# Patient Record
Sex: FEMALE | Race: WHITE | NOT HISPANIC OR LATINO | Employment: OTHER | URBAN - METROPOLITAN AREA
[De-identification: names, ages, dates, MRNs, and addresses within clinical notes are randomized per-mention and may not be internally consistent; named-entity substitution may affect disease eponyms.]

---

## 2023-03-23 ENCOUNTER — NURSING HOME VISIT (OUTPATIENT)
Dept: POST ACUTE CARE | Facility: EXTERNAL LOCATION | Age: 88
End: 2023-03-23
Payer: COMMERCIAL

## 2023-03-23 DIAGNOSIS — Z86.73 HISTORY OF CVA (CEREBROVASCULAR ACCIDENT): ICD-10-CM

## 2023-03-23 DIAGNOSIS — I10 BENIGN ESSENTIAL HTN: ICD-10-CM

## 2023-03-23 DIAGNOSIS — I50.9 CHRONIC CONGESTIVE HEART FAILURE, UNSPECIFIED HEART FAILURE TYPE (MULTI): ICD-10-CM

## 2023-03-23 DIAGNOSIS — R63.5 WEIGHT GAIN: ICD-10-CM

## 2023-03-23 DIAGNOSIS — F02.80 ALZHEIMER'S DISEASE (MULTI): Primary | ICD-10-CM

## 2023-03-23 DIAGNOSIS — G30.9 SEVERE ALZHEIMER'S DEMENTIA WITH OTHER BEHAVIORAL DISTURBANCE, UNSPECIFIED TIMING OF DEMENTIA ONSET (MULTI): ICD-10-CM

## 2023-03-23 DIAGNOSIS — M62.81 GENERALIZED MUSCLE WEAKNESS: ICD-10-CM

## 2023-03-23 DIAGNOSIS — G30.9 ALZHEIMER'S DISEASE (MULTI): Primary | ICD-10-CM

## 2023-03-23 DIAGNOSIS — Z74.09 IMMOBILITY: ICD-10-CM

## 2023-03-23 DIAGNOSIS — R53.1 LEFT-SIDED WEAKNESS: ICD-10-CM

## 2023-03-23 DIAGNOSIS — F02.C18 SEVERE ALZHEIMER'S DEMENTIA WITH OTHER BEHAVIORAL DISTURBANCE, UNSPECIFIED TIMING OF DEMENTIA ONSET (MULTI): ICD-10-CM

## 2023-03-23 PROCEDURE — 99309 SBSQ NF CARE MODERATE MDM 30: CPT | Performed by: NURSE PRACTITIONER

## 2023-03-23 NOTE — LETTER
Patient: Shonda Solano  : 3/14/1934    Encounter Date: 2023    Name: Shonda Solano    : 3/14/1934    Chief Complaint:  Follow up on Alzheimer's disease; Dementia; etc...    HPI   89 year old female patient at [facility] in Long Term Care.  She resides in the [facility].     Medical history includes: Difficulty in walking; muscle weakness (generalized); Acquired hammer toes on the right & left foot; Personal history of other infectious and parasitic diseases; disorientation; Alzheimer's disease; unspecified; symbolic dysfunctions; other speech and language deficits following unspecified cerebrovascular disease; mood disorder due to known physiological condition, unspecified; Bipolar disorder; Displaced intertrochanteric fracture of right femur; need for assistance with personal care; chronic kidney disease; primary osteoarthritis; unspecified ankle and foot; polyneuropathy, unspecified; unspecified abnormalities of gait and mobility; other osteoporosis without current pathological fracture.      Chronic comorbidities as follows:     Alzheimer's disease; Dementia:   Chronic. Stable.  Calm, cooperative, pleasantly confused.   A x O x 1.   On Aricept.   Also follows up with in-house psych NP at [facility]  In [facility] memory care unit.   Patient seen today she is in her room in bed.   Mentation at baseline.  No agitation reported.           Chronic diastolic heart disease; Benign essential HTN:   Chronic. Stable.  Recent /77  Not on any medications for HTN.   No increased edema or SOB reported.   Patient sitting in wheelchair.  Calm, cooperative.   No complaints of chest pain, SOB, headaches or dizziness.      Generalized muscle weakness; Immobility; :   Chronic. Stable.   In wheelchair. Spends most of the day sitting in a wheelchair.   Able to propel her wheelchair independently.    Weight gain:  Recent weights:  3/5/23 137.8 #  23 138.2- #  23 123.6 #     History of  CVA/left sided weakness:  Patient had a stroke in .  Chronic. Stable.  On ASA and Plavix.                 Reviewed  EMR  Reviewed medical, social, surgical and family history.  Reviewed all current medications and performed medication reconciliation.  Performed prescription drug management.  Reviewed vital signs AND lab results  Reviewed [facility] Documentation  Discussed patient with nursing.       ROS: Limited ROS due to mentation; ROS negative unless noted in HPI.     Surgical History  Problems    · History of Femur fracture repair   · History of Tonsillectomy     Family History  Mother    · Family history of   Father    · Family history of      Social History   · Never a smoker  No alcohol  No illicit drugs  Lives in long term care facility            Code Status: do not resuscitate DNR-cc    Past Medical History:   Diagnosis Date   • Other malaise 2020    Physical deconditioning   • Personal history of other diseases of the digestive system     History of constipation   • Personal history of other diseases of the nervous system and sense organs 2020    History of polyneuropathy   • Personal history of urinary (tract) infections     History of urinary tract infection   • Unspecified dementia, unspecified severity, without behavioral disturbance, psychotic disturbance, mood disturbance, and anxiety (CMS/[facility]) 12/10/2022    Dementia   • Unspecified diastolic (congestive) heart failure (CMS/[facility])     Diastolic heart failure   • Unspecified fracture of right femur, initial encounter for closed fracture (CMS/[facility])     Femur fracture, right             Lab Results   Component Value Date    WBC 6.4 2022    HGB 13.5 2022    HCT 42.0 2022     2022    ALT 12 10/14/2020    AST 19 10/14/2020     2022    K 4.1 2022     2022    CREATININE 0.61 2022    BUN 22 2022    CO2 27 2022    TSH 2.92 10/14/2020  "   INR 1.0 02/20/2022    HGBA1C 5.9 10/15/2020     Outside Labs  CBC: Date: 10/12/22, WBC: 4.8, Hgb: 11.9, Hct: 35.2, PLT: 290   CBC: Date: 7/7/22, WBC: 3.9, Hgb: 12.7, Hct: 38.5, PLT: 307   BMP: Date: 10/12/22, Na: 140, K: 4.3, Cl: 107, CO2: 25, BUN: 25, Cr: 0.6, Glu: 80, Ca: 9   BMP: Date: 7/7/22, Na: 141, K: 4.6, Cl: 105, CO2: 26, BUN: 30, Cr: 0.7, Glu: 113, Ca: 9.1   Hepatic Function Tests: Date: 3/8/22, AST: 15, ALT: 11, Alk Phos: 114, T Bili: 0.4, Albumin: 3.6   Hepatic Function Tests: Date: 3/9/21, AST: 15, ALT: 14, Alk Phos: 101, T Bili: 0.6, Albumin: 3.3   Lipid panel on 10/13/20 as follows: Cholesterol 186; triglyceride 84; HDL 51; ; VLDL 17.    Vitamin D 25 hydroxy level on 10/13/20 22 L    BMP: Date: 1/10/2023 Na 142 K 4.4 Cr 0.7 BUN 27 glucose 77 Ca 9.2 GFR 79  CBC: Date: 1/10/2023 WBC 6.9 Hgb 12.8 hematocrit 38.4 platelets 398  Liver function: Date: 1/10/2023 ALT 11 AST 18 alkaline phos.  112 bilirubin 0.5 albumin 3.7 protein 6.4              /77   Pulse 78   Temp 36.6 °C (97.8 °F)   Resp 18   Ht 1.6 m (5' 3\")   Wt 62.5 kg (137 lb 12.8 oz)   SpO2 95%   BMI 24.41 kg/m²      Physical Exam  Vitals and nursing note reviewed.   Constitutional:       Appearance: Normal appearance.   HENT:      Head: Normocephalic.      Right Ear: External ear normal.      Left Ear: External ear normal.      Nose: Nose normal.      Mouth/Throat:      Mouth: Mucous membranes are moist.      Pharynx: Oropharynx is clear.   Eyes:      Extraocular Movements: Extraocular movements intact.      Conjunctiva/sclera: Conjunctivae normal.      Pupils: Pupils are equal, round, and reactive to light.   Cardiovascular:      Rate and Rhythm: Normal rate and regular rhythm.      Pulses: Normal pulses.      Heart sounds: Normal heart sounds.   Pulmonary:      Effort: Pulmonary effort is normal.      Breath sounds: Normal breath sounds.   Abdominal:      General: Bowel sounds are normal.      Palpations: Abdomen is soft. "   Musculoskeletal:         General: Normal range of motion.      Cervical back: Normal range of motion and neck supple.      Comments: Generalized muscle weakness; impaired gait and mobility   Skin:     General: Skin is warm and dry.   Neurological:      Mental Status: She is alert. Mental status is at baseline. She is disoriented.      Motor: Weakness present.      Coordination: Coordination abnormal.      Gait: Gait abnormal.   Psychiatric:         Mood and Affect: Affect is inappropriate.         Speech: Speech is tangential.         Cognition and Memory: Cognition is impaired. Memory is impaired.      Comments: Dementia   A x O x 1          Assessment/Plan     Ordered CMP, CBC with diff. For April 17, 2023    Alzheimer's disease; Dementia:      Continue Aricept.   Also follows up with in-house psych NP at [facility]  In locked memory care unit.   Monitor for progression of dementia.  Monitor for behaviors.         Chronic diastolic heart disease; Benign essential HTN:       Monitor Bps.     Generalized muscle weakness; Immobility; :    In wheelchair.   Spends most of the day sitting in a wheelchair.   Able to propel her wheelchair independently.    Weight gain:  Monitor weights.      History of CVA/left sided weakness:  Patient had a stroke in October, 2020.  On ASA and Plavix.                  Problem List Items Addressed This Visit    None  Visit Diagnoses       Alzheimer's disease (CMS/HCC)    -  Primary    Severe Alzheimer's dementia with other behavioral disturbance, unspecified timing of dementia onset (CMS/HCC)        Chronic congestive heart failure, unspecified heart failure type (CMS/HCC)        Benign essential HTN        Immobility        Generalized muscle weakness        Weight gain        History of CVA (cerebrovascular accident)        Left-sided weakness                YIMI Jordan       Electronically Signed By: YIMI Jordan   3/26/23 11:18 PM

## 2023-03-26 VITALS
RESPIRATION RATE: 18 BRPM | DIASTOLIC BLOOD PRESSURE: 77 MMHG | TEMPERATURE: 97.8 F | SYSTOLIC BLOOD PRESSURE: 125 MMHG | HEART RATE: 78 BPM | BODY MASS INDEX: 24.41 KG/M2 | WEIGHT: 137.8 LBS | HEIGHT: 63 IN | OXYGEN SATURATION: 95 %

## 2023-03-27 NOTE — PROGRESS NOTES
Name: Shonda Solano    : 3/14/1934    Chief Complaint:  Follow up on Alzheimer's disease; Dementia; etc...    HPI   89 year old female patient at [facility] in Long Term Care.  She resides in the [facility].     Medical history includes: Difficulty in walking; muscle weakness (generalized); Acquired hammer toes on the right & left foot; Personal history of other infectious and parasitic diseases; disorientation; Alzheimer's disease; unspecified; symbolic dysfunctions; other speech and language deficits following unspecified cerebrovascular disease; mood disorder due to known physiological condition, unspecified; Bipolar disorder; Displaced intertrochanteric fracture of right femur; need for assistance with personal care; chronic kidney disease; primary osteoarthritis; unspecified ankle and foot; polyneuropathy, unspecified; unspecified abnormalities of gait and mobility; other osteoporosis without current pathological fracture.      Chronic comorbidities as follows:     Alzheimer's disease; Dementia:   Chronic. Stable.  Calm, cooperative, pleasantly confused.   A x O x 1.   On Aricept.   Also follows up with in-house psych NP at   In [facility] care unit.   Patient seen today she is in her room in bed.   Mentation at baseline.  No agitation reported.           Chronic diastolic heart disease; Benign essential HTN:   Chronic. Stable.  Recent /77  Not on any medications for HTN.   No increased edema or SOB reported.   Patient sitting in wheelchair.  Calm, cooperative.   No complaints of chest pain, SOB, headaches or dizziness.      Generalized muscle weakness; Immobility; :   Chronic. Stable.   In wheelchair. Spends most of the day sitting in a wheelchair.   Able to propel her wheelchair independently.    Weight gain:  Recent weights:  3/5/23 137.8 #  23 138.2- #  23 123.6 #     History of CVA/left sided weakness:  Patient had a stroke in October,  .  Chronic. Stable.  On ASA and Plavix.                 Reviewed  EMR  Reviewed medical, social, surgical and family history.  Reviewed all current medications and performed medication reconciliation.  Performed prescription drug management.  Reviewed vital signs AND lab results  Reviewed [facility] Documentation  Discussed patient with nursing.       ROS: Limited ROS due to mentation; ROS negative unless noted in HPI.     Surgical History  Problems    · History of Femur fracture repair   · History of Tonsillectomy     Family History  Mother    · Family history of   Father    · Family history of      Social History   · Never a smoker  No alcohol  No illicit drugs  Lives in long term care facility            Code Status: do not resuscitate DNR-cc    Past Medical History:   Diagnosis Date    Other malaise 2020    Physical deconditioning    Personal history of other diseases of the digestive system     History of constipation    Personal history of other diseases of the nervous system and sense organs 2020    History of polyneuropathy    Personal history of urinary (tract) infections     History of urinary tract infection    Unspecified dementia, unspecified severity, without behavioral disturbance, psychotic disturbance, mood disturbance, and anxiety (CMS/[facility]) 12/10/2022    Dementia    Unspecified diastolic (congestive) heart failure (CMS/[facility])     Diastolic heart failure    Unspecified fracture of right femur, initial encounter for closed fracture (CMS/[facility])     Femur fracture, right             Lab Results   Component Value Date    WBC 6.4 2022    HGB 13.5 2022    HCT 42.0 2022     2022    ALT 12 10/14/2020    AST 19 10/14/2020     2022    K 4.1 2022     2022    CREATININE 0.61 2022    BUN 22 2022    CO2 27 2022    TSH 2.92 10/14/2020    INR 1.0 2022    HGBA1C 5.9 10/15/2020     Outside  "Labs  CBC: Date: 10/12/22, WBC: 4.8, Hgb: 11.9, Hct: 35.2, PLT: 290   CBC: Date: 7/7/22, WBC: 3.9, Hgb: 12.7, Hct: 38.5, PLT: 307   BMP: Date: 10/12/22, Na: 140, K: 4.3, Cl: 107, CO2: 25, BUN: 25, Cr: 0.6, Glu: 80, Ca: 9   BMP: Date: 7/7/22, Na: 141, K: 4.6, Cl: 105, CO2: 26, BUN: 30, Cr: 0.7, Glu: 113, Ca: 9.1   Hepatic Function Tests: Date: 3/8/22, AST: 15, ALT: 11, Alk Phos: 114, T Bili: 0.4, Albumin: 3.6   Hepatic Function Tests: Date: 3/9/21, AST: 15, ALT: 14, Alk Phos: 101, T Bili: 0.6, Albumin: 3.3   Lipid panel on 10/13/20 as follows: Cholesterol 186; triglyceride 84; HDL 51; ; VLDL 17.    Vitamin D 25 hydroxy level on 10/13/20 22 L    BMP: Date: 1/10/2023 Na 142 K 4.4 Cr 0.7 BUN 27 glucose 77 Ca 9.2 GFR 79  CBC: Date: 1/10/2023 WBC 6.9 Hgb 12.8 hematocrit 38.4 platelets 398  Liver function: Date: 1/10/2023 ALT 11 AST 18 alkaline phos.  112 bilirubin 0.5 albumin 3.7 protein 6.4              /77   Pulse 78   Temp 36.6 °C (97.8 °F)   Resp 18   Ht 1.6 m (5' 3\")   Wt 62.5 kg (137 lb 12.8 oz)   SpO2 95%   BMI 24.41 kg/m²      Physical Exam  Vitals and nursing note reviewed.   Constitutional:       Appearance: Normal appearance.   HENT:      Head: Normocephalic.      Right Ear: External ear normal.      Left Ear: External ear normal.      Nose: Nose normal.      Mouth/Throat:      Mouth: Mucous membranes are moist.      Pharynx: Oropharynx is clear.   Eyes:      Extraocular Movements: Extraocular movements intact.      Conjunctiva/sclera: Conjunctivae normal.      Pupils: Pupils are equal, round, and reactive to light.   Cardiovascular:      Rate and Rhythm: Normal rate and regular rhythm.      Pulses: Normal pulses.      Heart sounds: Normal heart sounds.   Pulmonary:      Effort: Pulmonary effort is normal.      Breath sounds: Normal breath sounds.   Abdominal:      General: Bowel sounds are normal.      Palpations: Abdomen is soft.   Musculoskeletal:         General: Normal range of motion. "      Cervical back: Normal range of motion and neck supple.      Comments: Generalized muscle weakness; impaired gait and mobility   Skin:     General: Skin is warm and dry.   Neurological:      Mental Status: She is alert. Mental status is at baseline. She is disoriented.      Motor: Weakness present.      Coordination: Coordination abnormal.      Gait: Gait abnormal.   Psychiatric:         Mood and Affect: Affect is inappropriate.         Speech: Speech is tangential.         Cognition and Memory: Cognition is impaired. Memory is impaired.      Comments: Dementia   A x O x 1          Assessment/Plan      Ordered CMP, CBC with diff. For April 17, 2023    Alzheimer's disease; Dementia:      Continue Aricept.   Also follows up with in-house psych NP at [facility]  In locked memory care unit.   Monitor for progression of dementia.  Monitor for behaviors.         Chronic diastolic heart disease; Benign essential HTN:       Monitor Bps.     Generalized muscle weakness; Immobility; :    In wheelchair.   Spends most of the day sitting in a wheelchair.   Able to propel her wheelchair independently.    Weight gain:  Monitor weights.      History of CVA/left sided weakness:  Patient had a stroke in October, 2020.  On ASA and Plavix.                  Problem List Items Addressed This Visit    None  Visit Diagnoses       Alzheimer's disease (CMS/HCC)    -  Primary    Severe Alzheimer's dementia with other behavioral disturbance, unspecified timing of dementia onset (CMS/HCC)        Chronic congestive heart failure, unspecified heart failure type (CMS/HCC)        Benign essential HTN        Immobility        Generalized muscle weakness        Weight gain        History of CVA (cerebrovascular accident)        Left-sided weakness                Sandi Rudolph, SUSIE-CNP

## 2023-04-19 ENCOUNTER — NURSING HOME VISIT (OUTPATIENT)
Dept: POST ACUTE CARE | Facility: EXTERNAL LOCATION | Age: 88
End: 2023-04-19
Payer: COMMERCIAL

## 2023-04-19 DIAGNOSIS — G30.9 ALZHEIMER DISEASE (MULTI): ICD-10-CM

## 2023-04-19 DIAGNOSIS — F02.80 ALZHEIMER DISEASE (MULTI): ICD-10-CM

## 2023-04-19 DIAGNOSIS — F33.9 EPISODE OF RECURRENT MAJOR DEPRESSIVE DISORDER, UNSPECIFIED DEPRESSION EPISODE SEVERITY (CMS-HCC): ICD-10-CM

## 2023-04-19 DIAGNOSIS — I63.9 CEREBROVASCULAR ACCIDENT (CVA), UNSPECIFIED MECHANISM (MULTI): Primary | ICD-10-CM

## 2023-04-19 PROCEDURE — 99308 SBSQ NF CARE LOW MDM 20: CPT | Performed by: INTERNAL MEDICINE

## 2023-04-19 NOTE — LETTER
Patient: Shonda Solano  : 3/14/1934    Encounter Date: 2023     Ms Solano is a 89-year-old woman with history of Alzheimer's dementia,CVA().   She is a long-term resident at [facility]. She is confused   No recent falls. Appears sleepy today. No concerns by staff.   ROS: no changes in weight   Chest- no pain or palpitations   Lungs- no cough or SOB   Abd- no pain, no N/V/D   Ext- no swelling   Psych- memory impaired   Vital signs: /77   Temperature 97.8   Pulse 82   Weight 139.4 pounds   Gen- NAD, pleasantly confused   Neck- supple, no jvd   Lungs: Lungs clear toauscultation   Cardio: S1-S2 normal   ABD: Soft nontender bowel sounds present   Musc/Skel: No deformities or edema   Extremities: No edema   Neuro- alert, no gross deformity   Psych: Memory impaired   Assessment and Plan:   CVA-stable   Continuewith aspirin, Plavix   continue with fall and safety precautions   Alzheimer's dementia-stable   Continue with Aricept   Depression- followed by Psych   Continue with supportive care      Electronically Signed By: Indiana Wen MD   23 11:09 AM

## 2023-04-21 NOTE — PROGRESS NOTES
Ms Solano is a 89-year-old woman with history of Alzheimer's dementia,CVA(2020).   She is a long-term resident at [facility]. She is confused   No recent falls. Appears sleepy today. No concerns by staff.   ROS: no changes in weight   Chest- no pain or palpitations   Lungs- no cough or SOB   Abd- no pain, no N/V/D   Ext- no swelling   Psych- memory impaired   Vital signs: /77   Temperature 97.8   Pulse 82   Weight 139.4 pounds   Gen- NAD, pleasantly confused   Neck- supple, no jvd   Lungs: Lungs clear toauscultation   Cardio: S1-S2 normal   ABD: Soft nontender bowel sounds present   Musc/Skel: No deformities or edema   Extremities: No edema   Neuro- alert, no gross deformity   Psych: Memory impaired   Assessment and Plan:   CVA-stable   Continuewith aspirin, Plavix   continue with fall and safety precautions   Alzheimer's dementia-stable   Continue with Aricept   Depression- followed by Psych   Continue with supportive care

## 2023-06-23 ENCOUNTER — NURSING HOME VISIT (OUTPATIENT)
Dept: POST ACUTE CARE | Facility: EXTERNAL LOCATION | Age: 88
End: 2023-06-23
Payer: COMMERCIAL

## 2023-06-23 DIAGNOSIS — F33.9 EPISODE OF RECURRENT MAJOR DEPRESSIVE DISORDER, UNSPECIFIED DEPRESSION EPISODE SEVERITY (CMS-HCC): ICD-10-CM

## 2023-06-23 DIAGNOSIS — I63.00 CEREBROVASCULAR ACCIDENT (CVA) DUE TO THROMBOSIS OF PRECEREBRAL ARTERY (MULTI): ICD-10-CM

## 2023-06-23 DIAGNOSIS — E46 MALNUTRITION, UNSPECIFIED TYPE (MULTI): Primary | ICD-10-CM

## 2023-06-23 PROCEDURE — 99308 SBSQ NF CARE LOW MDM 20: CPT | Performed by: INTERNAL MEDICINE

## 2023-06-23 NOTE — LETTER
Patient: Shonda Solano  : 3/14/1934    Encounter Date: 2023    Ms Solano is a 89-year-old woman with history of Alzheimer's dementia,CVA().  She is a long-term resident at [facility]. She is confused  No recent falls. Sitting in dining [employer]. No concerns by staff.  ROS: no changes in weight  Chest- no pain or palpitations  Lungs- no cough or SOB  Abd- no pain, no N/V/D  Ext- no swelling  Psych- memory impaired  Vital signs: /72  Temperature 97.6  Pulse 74  Weight 138.6 pounds  Gen- NAD, pleasantly confused  Neck- supple, no jvd  Lungs: Lungs clear toauscultation  Cardio: S1-S2 normal  ABD: Soft nontender bowel sounds present  Musc/Skel: No deformities or edema  Extremities: No edema  Neuro- alert, no gross deformity  Psych: Memory impaired    Assessment and Plan:  CVA-stable  Continue with aspirin, Plavix  continue with fall and safety precautions  Alzheimer's dementia-stable  Continue with Aricept  Depression- followed by Psych  Continue with supportive care      Electronically Signed By: Indiana Wen MD   23  9:36 PM

## 2023-06-25 PROBLEM — E46 MALNUTRITION, UNSPECIFIED TYPE (MULTI): Status: ACTIVE | Noted: 2023-06-25

## 2023-06-26 NOTE — PROGRESS NOTES
Ms Solano is a 89-year-old woman with history of Alzheimer's dementia,CVA(2020).  She is a long-term resident at [facility]. She is confused  No recent falls. Sitting in [place] [employer]. No concerns by staff.  ROS: no changes in weight  Chest- no pain or palpitations  Lungs- no cough or SOB  Abd- no pain, no N/V/D  Ext- no swelling  Psych- memory impaired  Vital signs: /72  Temperature 97.6  Pulse 74  Weight 138.6 pounds  Gen- NAD, pleasantly confused  Neck- supple, no jvd  Lungs: Lungs clear toauscultation  Cardio: S1-S2 normal  ABD: Soft nontender bowel sounds present  Musc/Skel: No deformities or edema  Extremities: No edema  Neuro- alert, no gross deformity  Psych: Memory impaired    Assessment and Plan:  CVA-stable  Continue with aspirin, Plavix  continue with fall and safety precautions  Alzheimer's dementia-stable  Continue with Aricept  Depression- followed by Psych  Continue with supportive care

## 2023-07-06 ENCOUNTER — NURSING HOME VISIT (OUTPATIENT)
Dept: POST ACUTE CARE | Facility: EXTERNAL LOCATION | Age: 88
End: 2023-07-06
Payer: COMMERCIAL

## 2023-07-06 DIAGNOSIS — Z74.09 IMMOBILITY: ICD-10-CM

## 2023-07-06 DIAGNOSIS — M62.81 GENERALIZED MUSCLE WEAKNESS: ICD-10-CM

## 2023-07-06 DIAGNOSIS — F02.80 ALZHEIMER DISEASE (MULTI): ICD-10-CM

## 2023-07-06 DIAGNOSIS — I50.9 CHRONIC CONGESTIVE HEART FAILURE, UNSPECIFIED HEART FAILURE TYPE (MULTI): ICD-10-CM

## 2023-07-06 DIAGNOSIS — R53.1 LEFT-SIDED WEAKNESS: ICD-10-CM

## 2023-07-06 DIAGNOSIS — I10 BENIGN ESSENTIAL HTN: ICD-10-CM

## 2023-07-06 DIAGNOSIS — Z86.73 HISTORY OF CVA (CEREBROVASCULAR ACCIDENT): ICD-10-CM

## 2023-07-06 DIAGNOSIS — H10.33 ACUTE BACTERIAL CONJUNCTIVITIS OF BOTH EYES: Primary | ICD-10-CM

## 2023-07-06 DIAGNOSIS — F02.C18 SEVERE ALZHEIMER'S DEMENTIA WITH OTHER BEHAVIORAL DISTURBANCE, UNSPECIFIED TIMING OF DEMENTIA ONSET (MULTI): ICD-10-CM

## 2023-07-06 DIAGNOSIS — G30.9 SEVERE ALZHEIMER'S DEMENTIA WITH OTHER BEHAVIORAL DISTURBANCE, UNSPECIFIED TIMING OF DEMENTIA ONSET (MULTI): ICD-10-CM

## 2023-07-06 DIAGNOSIS — R63.5 WEIGHT GAIN: ICD-10-CM

## 2023-07-06 DIAGNOSIS — E55.9 VITAMIN D DEFICIENCY: ICD-10-CM

## 2023-07-06 DIAGNOSIS — G30.9 ALZHEIMER DISEASE (MULTI): ICD-10-CM

## 2023-07-06 PROCEDURE — 99309 SBSQ NF CARE MODERATE MDM 30: CPT | Performed by: NURSE PRACTITIONER

## 2023-07-06 NOTE — LETTER
Patient: Shonda Solano  : 3/14/1934    Encounter Date: 2023    Name: Shonda Solano    : 3/14/1934    [facility]    Chief Complaint:  Bilateral eye conjunctivitis; etc...    HPI   89 year old female patient at [facility] in Long Term Care.  She resides in the [facility].     Medical history includes: Difficulty in walking; muscle weakness (generalized); Acquired hammer toes on the right & left foot; Personal history of other infectious and parasitic diseases; disorientation; Alzheimer's disease; unspecified; symbolic dysfunctions; other speech and language deficits following unspecified cerebrovascular disease; mood disorder due to known physiological condition, unspecified; Bipolar disorder; Displaced intertrochanteric fracture of right femur; need for assistance with personal care; chronic kidney disease; primary osteoarthritis; unspecified ankle and foot; polyneuropathy, unspecified; unspecified abnormalities of gait and mobility; other osteoporosis without current pathological fracture.      Diagnoses as follows:     Bilateral eye conjunctivitis:  Nursing reports that patient's eyes are irritated and red.  Patient is on the list to see in-house ophthalmologist during his next visit to the facility, in a week or so.   The eye irritation was noticed reported today.  Patient seen today, she is in bed.  No complaints of eye pain or loss of vision.   Calm, cooperative, mentation at baseline.  No complaints of runny nose, cough, chest pain, headaches.     Vitamin D deficiency:  On 23 vitamin D 25 hydroxy level 19.62 L  Was previously on ergocalciferol.   Currently not on vitamin D3.      Alzheimer's disease; Dementia:   Calm, cooperative, pleasantly confused.   A x O x 1.   On Aricept.   Also follows up with in-house psych NP at [facility]  In [facility] care unit.   Mentation at baseline.  No agitation reported.           Chronic diastolic heart disease; Benign  essential HTN:   Chronic. Stable.  Recent /74.   Not on any medications for HTN.   No increased edema or SOB reported.   No complaints of chest pain, SOB, headaches or dizziness.        Weight gain:  Recent weights:  23 128.4 #  23 138.6 #  23 136.2 #  23 139.4 #  3/5/23 137.8 #  23 138.2- #  23 123.6 #    Immobility; Generalized muscle weakness:    In wheelchair. Spends most of the day sitting in a wheelchair.   Able to propel her wheelchair independently.     History of CVA/left sided weakness:  Patient had a stroke in .  On ASA and Plavix.                 Reviewed  EMR  Reviewed medical, social, surgical and family history.  Reviewed all current medications and performed medication reconciliation.  Performed prescription drug management.  Reviewed vital signs AND lab results  Reviewed [facility] Documentation  Discussed patient with nursing.       ROS: Limited ROS due to mentation; ROS negative unless noted in HPI.     Surgical History  Problems    · History of Femur fracture repair   · History of Tonsillectomy     Family History  Mother    · Family history of   Father    · Family history of      Social History   · Never a smoker  No alcohol  No illicit drugs  Lives in long term care facility         Past Medical History:   Diagnosis Date   • Other malaise 2020    Physical deconditioning   • Personal history of other diseases of the digestive system     History of constipation   • Personal history of other diseases of the nervous system and sense organs 2020    History of polyneuropathy   • Personal history of urinary (tract) infections     History of urinary tract infection   • Unspecified dementia, unspecified severity, without behavioral disturbance, psychotic disturbance, mood disturbance, and anxiety (CMS/[facility]) 12/10/2022    Dementia   • Unspecified diastolic (congestive) heart failure (CMS/[facility])     Diastolic heart failure   •  "Unspecified fracture of right femur, initial encounter for closed fracture (CMS/HCC)     Femur fracture, right             Lab Results   Component Value Date    WBC 6.4 02/20/2022    HGB 13.5 02/20/2022    HCT 42.0 02/20/2022     02/20/2022    ALT 12 10/14/2020    AST 19 10/14/2020     02/20/2022    K 4.1 02/20/2022     02/20/2022    CREATININE 0.61 02/20/2022    BUN 22 02/20/2022    CO2 27 02/20/2022    TSH 2.92 10/14/2020    INR 1.0 02/20/2022    HGBA1C 5.9 10/15/2020     Outside Labs  CBC: Date: 10/12/22, WBC: 4.8, Hgb: 11.9, Hct: 35.2, PLT: 290   CBC: Date: 7/7/22, WBC: 3.9, Hgb: 12.7, Hct: 38.5, PLT: 307   BMP: Date: 10/12/22, Na: 140, K: 4.3, Cl: 107, CO2: 25, BUN: 25, Cr: 0.6, Glu: 80, Ca: 9   BMP: Date: 7/7/22, Na: 141, K: 4.6, Cl: 105, CO2: 26, BUN: 30, Cr: 0.7, Glu: 113, Ca: 9.1   Hepatic Function Tests: Date: 3/8/22, AST: 15, ALT: 11, Alk Phos: 114, T Bili: 0.4, Albumin: 3.6   Hepatic Function Tests: Date: 3/9/21, AST: 15, ALT: 14, Alk Phos: 101, T Bili: 0.6, Albumin: 3.3   Lipid panel on 10/13/20 as follows: Cholesterol 186; triglyceride 84; HDL 51; ; VLDL 17.    Vitamin D 25 hydroxy level on 10/13/20 22 L    BMP: Date: 1/10/2023 Na 142 K 4.4 Cr 0.7 BUN 27 glucose 77 Ca 9.2 GFR 79  CBC: Date: 1/10/2023 WBC 6.9 Hgb 12.8 hematocrit 38.4 platelets 398  Liver function: Date: 1/10/2023 ALT 11 AST 18 alkaline phos.  112 bilirubin 0.5 albumin 3.7 protein 6.4      RECENT LABS:  BMP: Date: 4/18/2023 Na 145 K 4 Cr 0.7 BUN 25 glucose 85 Ca 8.8 GFR 79  CBC: Date: 4/18/2023 WBC 5.2 Hgb 12.6 hematocrit 38.3 platelets 298  Liver function: Date: 4/18/2023 ALT 6 AST 11 alkaline phos.  94 bilirubin 0.5 albumin 3.4 protein 5.7   4/18/23 vitamin D 25 hydroxy level 19.62 L      /74   Pulse 70   Temp 36.5 °C (97.7 °F)   Resp 18   Ht 1.6 m (5' 3\")   Wt 58.2 kg (128 lb 6.4 oz)   SpO2 95%   BMI 22.75 kg/m²      Physical Exam  Vitals and nursing note reviewed.   Constitutional:       " General: She is awake.      Appearance: Normal appearance.   HENT:      Head: Normocephalic.      Right Ear: External ear normal.      Left Ear: External ear normal.      Nose: Nose normal.      Mouth/Throat:      Mouth: Mucous membranes are moist.      Pharynx: Oropharynx is clear.   Eyes:      Extraocular Movements: Extraocular movements intact.      Conjunctiva/sclera:      Right eye: Right conjunctiva is injected. No exudate.     Left eye: Left conjunctiva is injected. No exudate.     Pupils: Pupils are equal, round, and reactive to light.   Cardiovascular:      Rate and Rhythm: Normal rate and regular rhythm.      Pulses: Normal pulses.      Heart sounds: Normal heart sounds.   Pulmonary:      Effort: Pulmonary effort is normal.      Breath sounds: Normal breath sounds.   Abdominal:      General: Bowel sounds are normal.      Palpations: Abdomen is soft.   Musculoskeletal:         General: Normal range of motion.      Cervical back: Normal range of motion and neck supple.      Comments: Generalized muscle weakness; impaired gait and mobility   Skin:     General: Skin is warm and dry.   Neurological:      Mental Status: She is alert. Mental status is at baseline. She is disoriented.      Motor: Weakness present.      Coordination: Coordination abnormal.      Gait: Gait abnormal.   Psychiatric:         Mood and Affect: Affect is inappropriate.         Speech: Speech is tangential.         Behavior: Behavior is cooperative.         Cognition and Memory: Cognition is impaired. Memory is impaired.      Comments: Dementia   A x O x 1          Assessment/Plan         Ordered CMP, CBC with diff., vitamin D hydroxy level for Monday.    Bilateral eye conjunctivitis:      Start moxifloxacin ophthalmic 0.5% solution: 1 drop in both eyes TID for 7 days.   Patient is on the list to see in-house ophthalmologist during his next visit to the facility, in a week or so.   Follow up with ophthalmologist.     Vitamin D  deficiency:  Start vitamin D 3 1000 units PO every day.  Ordered vitamin D 25 hydroxy level for Monday.        Alzheimer's disease; Dementia:   Continue Aricept.   Follow up with in-house psych NP at [facility]  In locked memory care unit.   Monitor for agitation/behaviors.         Chronic diastolic heart disease; Benign essential HTN:   Monitor Bps.  Monitor weights.      Weight gain:  Monitor weights.    Immobility; Generalized muscle weakness:    Continue to use wheelchair.    Fall prevention strategies.      History of CVA/left sided weakness:  Patient had a stroke in October, 2020.  Continue ASA and Plavix.          Problem List Items Addressed This Visit    None  Visit Diagnoses       Acute bacterial conjunctivitis of both eyes    -  Primary    Vitamin D deficiency        Alzheimer disease (CMS/HCC)        Severe Alzheimer's dementia with other behavioral disturbance, unspecified timing of dementia onset (CMS/HCC)        Chronic congestive heart failure, unspecified heart failure type (CMS/HCC)        Benign essential HTN        Weight gain        Immobility        Generalized muscle weakness        History of CVA (cerebrovascular accident)        Left-sided weakness              YIMI Jordan       Electronically Signed By: YIMI Jordan   7/9/23 10:44 PM

## 2023-07-09 VITALS
HEIGHT: 63 IN | DIASTOLIC BLOOD PRESSURE: 74 MMHG | WEIGHT: 128.4 LBS | OXYGEN SATURATION: 95 % | RESPIRATION RATE: 18 BRPM | SYSTOLIC BLOOD PRESSURE: 122 MMHG | BODY MASS INDEX: 22.75 KG/M2 | HEART RATE: 70 BPM | TEMPERATURE: 97.7 F

## 2023-07-10 NOTE — PROGRESS NOTES
Name: Shonda Solano    : 3/14/1934    [facility]    Chief Complaint:  Bilateral eye conjunctivitis; etc...    HPI   89 year old female patient at [facility] in Long Term Care.  She resides in the [facility].     Medical history includes: Difficulty in walking; muscle weakness (generalized); Acquired hammer toes on the right & left foot; Personal history of other infectious and parasitic diseases; disorientation; Alzheimer's disease; unspecified; symbolic dysfunctions; other speech and language deficits following unspecified cerebrovascular disease; mood disorder due to known physiological condition, unspecified; Bipolar disorder; Displaced intertrochanteric fracture of right femur; need for assistance with personal care; chronic kidney disease; primary osteoarthritis; unspecified ankle and foot; polyneuropathy, unspecified; unspecified abnormalities of gait and mobility; other osteoporosis without current pathological fracture.      Diagnoses as follows:     Bilateral eye conjunctivitis:  Nursing reports that patient's eyes are irritated and red.  Patient is on the list to see in-house ophthalmologist during his next visit to the facility, in a week or so.   The eye irritation was noticed reported today.  Patient seen today, she is in bed.  No complaints of eye pain or loss of vision.   Calm, cooperative, mentation at baseline.  No complaints of runny nose, cough, chest pain, headaches.     Vitamin D deficiency:  On 23 vitamin D 25 hydroxy level 19.62 L  Was previously on ergocalciferol.   Currently not on vitamin D3.      Alzheimer's disease; Dementia:   Calm, cooperative, pleasantly confused.   A x O x 1.   On Aricept.   Also follows up with in-house psych NP at [facility]  In [facility] care unit.   Mentation at baseline.  No agitation reported.           Chronic diastolic heart disease; Benign essential HTN:   Chronic. Stable.  Recent /74.   Not on any  medications for HTN.   No increased edema or SOB reported.   No complaints of chest pain, SOB, headaches or dizziness.        Weight gain:  Recent weights:  23 128.4 #  23 138.6 #  23 136.2 #  23 139.4 #  3/5/23 137.8 #  23 138.2- #  23 123.6 #    Immobility; Generalized muscle weakness:    In wheelchair. Spends most of the day sitting in a wheelchair.   Able to propel her wheelchair independently.     History of CVA/left sided weakness:  Patient had a stroke in .  On ASA and Plavix.                 Reviewed  EMR  Reviewed medical, social, surgical and family history.  Reviewed all current medications and performed medication reconciliation.  Performed prescription drug management.  Reviewed vital signs AND lab results  Reviewed [facility] Documentation  Discussed patient with nursing.       ROS: Limited ROS due to mentation; ROS negative unless noted in HPI.     Surgical History  Problems    · History of Femur fracture repair   · History of Tonsillectomy     Family History  Mother    · Family history of   Father    · Family history of      Social History   · Never a smoker  No alcohol  No illicit drugs  Lives in long term care facility         Past Medical History:   Diagnosis Date    Other malaise 2020    Physical deconditioning    Personal history of other diseases of the digestive system     History of constipation    Personal history of other diseases of the nervous system and sense organs 2020    History of polyneuropathy    Personal history of urinary (tract) infections     History of urinary tract infection    Unspecified dementia, unspecified severity, without behavioral disturbance, psychotic disturbance, mood disturbance, and anxiety (CMS/[facility]) 12/10/2022    Dementia    Unspecified diastolic (congestive) heart failure (CMS/[facility])     Diastolic heart failure    Unspecified fracture of right femur, initial encounter for closed fracture  "(CMS/HCC)     Femur fracture, right             Lab Results   Component Value Date    WBC 6.4 02/20/2022    HGB 13.5 02/20/2022    HCT 42.0 02/20/2022     02/20/2022    ALT 12 10/14/2020    AST 19 10/14/2020     02/20/2022    K 4.1 02/20/2022     02/20/2022    CREATININE 0.61 02/20/2022    BUN 22 02/20/2022    CO2 27 02/20/2022    TSH 2.92 10/14/2020    INR 1.0 02/20/2022    HGBA1C 5.9 10/15/2020     Outside Labs  CBC: Date: 10/12/22, WBC: 4.8, Hgb: 11.9, Hct: 35.2, PLT: 290   CBC: Date: 7/7/22, WBC: 3.9, Hgb: 12.7, Hct: 38.5, PLT: 307   BMP: Date: 10/12/22, Na: 140, K: 4.3, Cl: 107, CO2: 25, BUN: 25, Cr: 0.6, Glu: 80, Ca: 9   BMP: Date: 7/7/22, Na: 141, K: 4.6, Cl: 105, CO2: 26, BUN: 30, Cr: 0.7, Glu: 113, Ca: 9.1   Hepatic Function Tests: Date: 3/8/22, AST: 15, ALT: 11, Alk Phos: 114, T Bili: 0.4, Albumin: 3.6   Hepatic Function Tests: Date: 3/9/21, AST: 15, ALT: 14, Alk Phos: 101, T Bili: 0.6, Albumin: 3.3   Lipid panel on 10/13/20 as follows: Cholesterol 186; triglyceride 84; HDL 51; ; VLDL 17.    Vitamin D 25 hydroxy level on 10/13/20 22 L    BMP: Date: 1/10/2023 Na 142 K 4.4 Cr 0.7 BUN 27 glucose 77 Ca 9.2 GFR 79  CBC: Date: 1/10/2023 WBC 6.9 Hgb 12.8 hematocrit 38.4 platelets 398  Liver function: Date: 1/10/2023 ALT 11 AST 18 alkaline phos.  112 bilirubin 0.5 albumin 3.7 protein 6.4      RECENT LABS:  BMP: Date: 4/18/2023 Na 145 K 4 Cr 0.7 BUN 25 glucose 85 Ca 8.8 GFR 79  CBC: Date: 4/18/2023 WBC 5.2 Hgb 12.6 hematocrit 38.3 platelets 298  Liver function: Date: 4/18/2023 ALT 6 AST 11 alkaline phos.  94 bilirubin 0.5 albumin 3.4 protein 5.7   4/18/23 vitamin D 25 hydroxy level 19.62 L      /74   Pulse 70   Temp 36.5 °C (97.7 °F)   Resp 18   Ht 1.6 m (5' 3\")   Wt 58.2 kg (128 lb 6.4 oz)   SpO2 95%   BMI 22.75 kg/m²      Physical Exam  Vitals and nursing note reviewed.   Constitutional:       General: She is awake.      Appearance: Normal appearance.   HENT:      Head: " Normocephalic.      Right Ear: External ear normal.      Left Ear: External ear normal.      Nose: Nose normal.      Mouth/Throat:      Mouth: Mucous membranes are moist.      Pharynx: Oropharynx is clear.   Eyes:      Extraocular Movements: Extraocular movements intact.      Conjunctiva/sclera:      Right eye: Right conjunctiva is injected. No exudate.     Left eye: Left conjunctiva is injected. No exudate.     Pupils: Pupils are equal, round, and reactive to light.   Cardiovascular:      Rate and Rhythm: Normal rate and regular rhythm.      Pulses: Normal pulses.      Heart sounds: Normal heart sounds.   Pulmonary:      Effort: Pulmonary effort is normal.      Breath sounds: Normal breath sounds.   Abdominal:      General: Bowel sounds are normal.      Palpations: Abdomen is soft.   Musculoskeletal:         General: Normal range of motion.      Cervical back: Normal range of motion and neck supple.      Comments: Generalized muscle weakness; impaired gait and mobility   Skin:     General: Skin is warm and dry.   Neurological:      Mental Status: She is alert. Mental status is at baseline. She is disoriented.      Motor: Weakness present.      Coordination: Coordination abnormal.      Gait: Gait abnormal.   Psychiatric:         Mood and Affect: Affect is inappropriate.         Speech: Speech is tangential.         Behavior: Behavior is cooperative.         Cognition and Memory: Cognition is impaired. Memory is impaired.      Comments: Dementia   A x O x 1          Assessment/Plan          Ordered CMP, CBC with diff., vitamin D hydroxy level for Monday.    Bilateral eye conjunctivitis:      Start moxifloxacin ophthalmic 0.5% solution: 1 drop in both eyes TID for 7 days.   Patient is on the list to see in-house ophthalmologist during his next visit to the facility, in a week or so.   Follow up with ophthalmologist.     Vitamin D deficiency:  Start vitamin D 3 1000 units PO every day.  Ordered vitamin D 25 hydroxy  level for Monday.        Alzheimer's disease; Dementia:   Continue Aricept.   Follow up with in-house psych NP at [facility]  In locked memory care unit.   Monitor for agitation/behaviors.         Chronic diastolic heart disease; Benign essential HTN:   Monitor Bps.  Monitor weights.      Weight gain:  Monitor weights.    Immobility; Generalized muscle weakness:    Continue to use wheelchair.    Fall prevention strategies.      History of CVA/left sided weakness:  Patient had a stroke in October, 2020.  Continue ASA and Plavix.          Problem List Items Addressed This Visit    None  Visit Diagnoses       Acute bacterial conjunctivitis of both eyes    -  Primary    Vitamin D deficiency        Alzheimer disease (CMS/HCC)        Severe Alzheimer's dementia with other behavioral disturbance, unspecified timing of dementia onset (CMS/HCC)        Chronic congestive heart failure, unspecified heart failure type (CMS/HCC)        Benign essential HTN        Weight gain        Immobility        Generalized muscle weakness        History of CVA (cerebrovascular accident)        Left-sided weakness              Sandi Rudolph, APRN-CNP

## 2023-08-03 ENCOUNTER — NURSING HOME VISIT (OUTPATIENT)
Dept: POST ACUTE CARE | Facility: EXTERNAL LOCATION | Age: 88
End: 2023-08-03
Payer: COMMERCIAL

## 2023-08-03 DIAGNOSIS — G30.9 ALZHEIMER DISEASE (MULTI): Primary | ICD-10-CM

## 2023-08-03 DIAGNOSIS — R63.4 WEIGHT LOSS: ICD-10-CM

## 2023-08-03 DIAGNOSIS — I69.30 SEQUELA OF CEREBROVASCULAR ACCIDENT: ICD-10-CM

## 2023-08-03 DIAGNOSIS — F02.80 ALZHEIMER DISEASE (MULTI): Primary | ICD-10-CM

## 2023-08-03 DIAGNOSIS — I10 BENIGN ESSENTIAL HTN: ICD-10-CM

## 2023-08-03 DIAGNOSIS — G30.9 SEVERE ALZHEIMER'S DEMENTIA WITH OTHER BEHAVIORAL DISTURBANCE, UNSPECIFIED TIMING OF DEMENTIA ONSET (MULTI): ICD-10-CM

## 2023-08-03 DIAGNOSIS — F02.C18 SEVERE ALZHEIMER'S DEMENTIA WITH OTHER BEHAVIORAL DISTURBANCE, UNSPECIFIED TIMING OF DEMENTIA ONSET (MULTI): ICD-10-CM

## 2023-08-03 DIAGNOSIS — I50.9 CHRONIC CONGESTIVE HEART FAILURE, UNSPECIFIED HEART FAILURE TYPE (MULTI): ICD-10-CM

## 2023-08-03 DIAGNOSIS — Z74.09 IMMOBILITY: ICD-10-CM

## 2023-08-03 DIAGNOSIS — M62.81 GENERALIZED MUSCLE WEAKNESS: ICD-10-CM

## 2023-08-03 DIAGNOSIS — E55.9 VITAMIN D DEFICIENCY: ICD-10-CM

## 2023-08-03 DIAGNOSIS — R53.1 LEFT-SIDED WEAKNESS: ICD-10-CM

## 2023-08-03 PROCEDURE — 99309 SBSQ NF CARE MODERATE MDM 30: CPT | Performed by: NURSE PRACTITIONER

## 2023-08-03 NOTE — LETTER
Patient: Shonda Solano  : 3/14/1934    Encounter Date: 2023    Name: Shonda Solano    : 3/14/1934    Code Status:  DNR-cc    Chief Complaint:  Follow up on Alzheimer's disease; Dementia; etc...    HPI   89 year old female patient at [facility] in Long Term Care.  She resides in the [facility].     Medical history includes: Difficulty in walking; muscle weakness (generalized); Acquired hammer toes on the right & left foot; Personal history of other infectious and parasitic diseases; disorientation; Alzheimer's disease; unspecified; symbolic dysfunctions; other speech and language deficits following unspecified cerebrovascular disease; mood disorder due to known physiological condition, unspecified; Bipolar disorder; Displaced intertrochanteric fracture of right femur; need for assistance with personal care; chronic kidney disease; primary osteoarthritis; unspecified ankle and foot; polyneuropathy, unspecified; unspecified abnormalities of gait and mobility; other osteoporosis without current pathological fracture.      Chronic comorbidities as follows:     Alzheimer's disease; Dementia:   On Aricept.   Also follows up with in-house psych NP at   Calm, cooperative, pleasantly confused.   A x O x 1.   Patient seen today she is sitting up in a wheelchair.   Mentation at baseline.  No agitation reported.   Follows simple commands.          Benign essential HTN; Chronic diastolic heart disease; :   Recent /74  Not on any medications for HTN.   No increased edema or SOB reported.   Patient sitting in wheelchair.  Calm, cooperative.   No complaints of chest pain, SOB, headaches or dizziness.    Vitamin D deficiency:  On vitamin D3 2000 units PO every day.  On 23 vitamin D 25 hydroxy level was 19.62 L      Immobility; Generalized muscle weakness:    In wheelchair. Spends most of the day sitting in a wheelchair.   Able to propel her wheelchair  independently.    Weight loss:  Recent weights:  7/3/23 128.4 #  23 138.6 #  23 136.2 #  23 139.4 #  3/5/23 137.8 #  23 138.2 #       Sequela of  CVA/left sided weakness:  Patient had a stroke in .  On ASA and Plavix.  Requires full supportive care.                  Reviewed  EMR  Reviewed medical, social, surgical and family history.  Reviewed all current medications and performed medication reconciliation.  Performed prescription drug management.  Reviewed vital signs AND lab results  Reviewed [facility] Care Documentation  Discussed patient with nursing.       ROS: Limited ROS due to mentation; ROS negative unless noted in HPI.     Surgical History  Problems    · History of Femur fracture repair   · History of Tonsillectomy     Family History  Mother    · Family history of   Father    · Family history of      Social History   · Never a smoker  No alcohol  No illicit drugs  Lives in long term care facility              Past Medical History:   Diagnosis Date   • Other malaise 2020    Physical deconditioning   • Personal history of other diseases of the digestive system     History of constipation   • Personal history of other diseases of the nervous system and sense organs 2020    History of polyneuropathy   • Personal history of urinary (tract) infections     History of urinary tract infection   • Unspecified dementia, unspecified severity, without behavioral disturbance, psychotic disturbance, mood disturbance, and anxiety (CMS/[facility]) 12/10/2022    Dementia   • Unspecified diastolic (congestive) heart failure (CMS/[facility])     Diastolic heart failure   • Unspecified fracture of right femur, initial encounter for closed fracture (CMS/[facility])     Femur fracture, right             Lab Results   Component Value Date    WBC 6.4 2022    HGB 13.5 2022    HCT 42.0 2022     2022    ALT 12 10/14/2020    AST 19 10/14/2020     2022  "   K 4.1 02/20/2022     02/20/2022    CREATININE 0.61 02/20/2022    BUN 22 02/20/2022    CO2 27 02/20/2022    TSH 2.92 10/14/2020    INR 1.0 02/20/2022    HGBA1C 5.9 10/15/2020     Outside Labs  CBC: Date: 10/12/22, WBC: 4.8, Hgb: 11.9, Hct: 35.2, PLT: 290   CBC: Date: 7/7/22, WBC: 3.9, Hgb: 12.7, Hct: 38.5, PLT: 307   BMP: Date: 10/12/22, Na: 140, K: 4.3, Cl: 107, CO2: 25, BUN: 25, Cr: 0.6, Glu: 80, Ca: 9   BMP: Date: 7/7/22, Na: 141, K: 4.6, Cl: 105, CO2: 26, BUN: 30, Cr: 0.7, Glu: 113, Ca: 9.1   Hepatic Function Tests: Date: 3/8/22, AST: 15, ALT: 11, Alk Phos: 114, T Bili: 0.4, Albumin: 3.6   Hepatic Function Tests: Date: 3/9/21, AST: 15, ALT: 14, Alk Phos: 101, T Bili: 0.6, Albumin: 3.3   Lipid panel on 10/13/20 as follows: Cholesterol 186; triglyceride 84; HDL 51; ; VLDL 17.    Vitamin D 25 hydroxy level on 10/13/20 22 L    BMP: Date: 1/10/2023 Na 142 K 4.4 Cr 0.7 BUN 27 glucose 77 Ca 9.2 GFR 79  CBC: Date: 1/10/2023 WBC 6.9 Hgb 12.8 hematocrit 38.4 platelets 398  Liver function: Date: 1/10/2023 ALT 11 AST 18 alkaline phos.  112 bilirubin 0.5 albumin 3.7 protein 6.4    BMP: Date: 4/18/2023 Na 145 K 4 Cr 0.7 BUN 25 glucose 85 Ca 8.8 GFR 79  CBC: Date: 4/18/2023 WBC 5.2 Hgb 12.6 hematocrit 38.3 platelets 298  Liver function: Date: 4/18/2023 ALT 6 AST 11 alkaline phos.  94 bilirubin 0.5 albumin 3.4 protein 5.7   4/18/23 vitamin D 25 hydroxy level 19.62 L        /74   Pulse 72   Temp 36.2 °C (97.1 °F)   Resp 18   Ht 1.6 m (5' 3\")   Wt 58.2 kg (128 lb 6.4 oz)   SpO2 98%   BMI 22.75 kg/m²      Physical Exam  Vitals and nursing note reviewed.   Constitutional:       Appearance: Normal appearance.   HENT:      Head: Normocephalic.      Right Ear: External ear normal.      Left Ear: External ear normal.      Nose: Nose normal.      Mouth/Throat:      Mouth: Mucous membranes are moist.      Pharynx: Oropharynx is clear.   Eyes:      Extraocular Movements: Extraocular movements intact.      " Conjunctiva/sclera: Conjunctivae normal.      Pupils: Pupils are equal, round, and reactive to light.   Cardiovascular:      Rate and Rhythm: Normal rate and regular rhythm.      Pulses: Normal pulses.      Heart sounds: Normal heart sounds.   Pulmonary:      Effort: Pulmonary effort is normal.      Breath sounds: Normal breath sounds.   Abdominal:      General: Bowel sounds are normal.      Palpations: Abdomen is soft.   Musculoskeletal:         General: Normal range of motion.      Cervical back: Normal range of motion and neck supple.      Comments: Generalized muscle weakness; impaired gait and mobility   Skin:     General: Skin is warm and dry.   Neurological:      Mental Status: She is alert. Mental status is at baseline. She is disoriented.      Motor: Weakness present.      Coordination: Coordination abnormal.      Gait: Gait abnormal.   Psychiatric:         Mood and Affect: Affect is inappropriate.         Speech: Speech is tangential.         Cognition and Memory: Cognition is impaired. Memory is impaired.      Comments: Dementia   A x O x 1          Assessment/Plan     Ordered CMP, CBC with diff. , vitamin D 25 hydroxy level for 8/7/23.        Alzheimer's disease; Dementia:      Continue Aricept.   Follow up with in-house psych NP at [facility]  Monitor for progression of dementia.  Monitor for behaviors.         Benign essential HTN; Chronic diastolic heart disease; :       Monitor Bps.    Vitamin D deficiency:  Continue vitamin D3 2000 units PO every day.  Order/ monitor vitamin D 25 hydroxy level      Immobility; Generalized muscle weakness:    In wheelchair.   Spends most of the day sitting in a wheelchair.   Able to propel her wheelchair independently.  Fall prevention strategies.     Weight loss:  Monitor weights.   Dietician to follow patient in-house.      Sequela of  CVA/left sided weakness:  Patient had a stroke in October, 2020.  Continue ASA and Plavix.  Requires full supportive care.              Problem List Items Addressed This Visit    None  Visit Diagnoses       Alzheimer disease (CMS/[facility])    -  Primary    Severe Alzheimer's dementia with other behavioral disturbance, unspecified timing of dementia onset (CMS/[facility])        Benign essential HTN        Chronic congestive heart failure, unspecified heart failure type (CMS/[facility])        Vitamin D deficiency        Immobility        Generalized muscle weakness        Weight loss        Sequela of cerebrovascular accident        Left-sided weakness              YIMI Jrodan       Electronically Signed By: YIMI Jordan   8/6/23  7:09 PM

## 2023-08-06 VITALS
TEMPERATURE: 97.1 F | SYSTOLIC BLOOD PRESSURE: 118 MMHG | RESPIRATION RATE: 18 BRPM | HEART RATE: 72 BPM | BODY MASS INDEX: 22.75 KG/M2 | OXYGEN SATURATION: 98 % | DIASTOLIC BLOOD PRESSURE: 74 MMHG | HEIGHT: 63 IN | WEIGHT: 128.4 LBS

## 2023-08-06 NOTE — PROGRESS NOTES
Name: Shonda Solano    : 3/14/1934    Code Status:  DNR-cc    Chief Complaint:  Follow up on Alzheimer's disease; Dementia; etc...    HPI   89 year old female patient at [facility] in Long Term Care.  She resides in the [facility].     Medical history includes: Difficulty in walking; muscle weakness (generalized); Acquired hammer toes on the right & left foot; Personal history of other infectious and parasitic diseases; disorientation; Alzheimer's disease; unspecified; symbolic dysfunctions; other speech and language deficits following unspecified cerebrovascular disease; mood disorder due to known physiological condition, unspecified; Bipolar disorder; Displaced intertrochanteric fracture of right femur; need for assistance with personal care; chronic kidney disease; primary osteoarthritis; unspecified ankle and foot; polyneuropathy, unspecified; unspecified abnormalities of gait and mobility; other osteoporosis without current pathological fracture.      Chronic comorbidities as follows:     Alzheimer's disease; Dementia:   On Aricept.   Also follows up with in-house psych NP at   Calm, cooperative, pleasantly confused.   A x O x 1.   Patient seen today she is sitting up in a wheelchair.   Mentation at baseline.  No agitation reported.   Follows simple commands.          Benign essential HTN; Chronic diastolic heart disease; :   Recent /74  Not on any medications for HTN.   No increased edema or SOB reported.   Patient sitting in wheelchair.  Calm, cooperative.   No complaints of chest pain, SOB, headaches or dizziness.    Vitamin D deficiency:  On vitamin D3 2000 units PO every day.  On 23 vitamin D 25 hydroxy level was 19.62 L      Immobility; Generalized muscle weakness:    In wheelchair. Spends most of the day sitting in a wheelchair.   Able to propel her wheelchair independently.    Weight loss:  Recent weights:  7/3/23 128.4 #  23 138.6 #  23 136.2  #  23 139.4 #  3/5/23 137.8 #  23 138.2 #       Sequela of  CVA/left sided weakness:  Patient had a stroke in .  On ASA and Plavix.  Requires full supportive care.                  Reviewed  EMR  Reviewed medical, social, surgical and family history.  Reviewed all current medications and performed medication reconciliation.  Performed prescription drug management.  Reviewed vital signs AND lab results  Reviewed [facility] Documentation  Discussed patient with nursing.       ROS: Limited ROS due to mentation; ROS negative unless noted in HPI.     Surgical History  Problems    · History of Femur fracture repair   · History of Tonsillectomy     Family History  Mother    · Family history of   Father    · Family history of      Social History   · Never a smoker  No alcohol  No illicit drugs  Lives in long term care facility              Past Medical History:   Diagnosis Date    Other malaise 2020    Physical deconditioning    Personal history of other diseases of the digestive system     History of constipation    Personal history of other diseases of the nervous system and sense organs 2020    History of polyneuropathy    Personal history of urinary (tract) infections     History of urinary tract infection    Unspecified dementia, unspecified severity, without behavioral disturbance, psychotic disturbance, mood disturbance, and anxiety (CMS/[facility]) 12/10/2022    Dementia    Unspecified diastolic (congestive) heart failure (CMS/[facility])     Diastolic heart failure    Unspecified fracture of right femur, initial encounter for closed fracture (CMS/[facility])     Femur fracture, right             Lab Results   Component Value Date    WBC 6.4 2022    HGB 13.5 2022    HCT 42.0 2022     2022    ALT 12 10/14/2020    AST 19 10/14/2020     2022    K 4.1 2022     2022    CREATININE 0.61 2022    BUN 22 2022    CO2  "27 02/20/2022    TSH 2.92 10/14/2020    INR 1.0 02/20/2022    HGBA1C 5.9 10/15/2020     Outside Labs  CBC: Date: 10/12/22, WBC: 4.8, Hgb: 11.9, Hct: 35.2, PLT: 290   CBC: Date: 7/7/22, WBC: 3.9, Hgb: 12.7, Hct: 38.5, PLT: 307   BMP: Date: 10/12/22, Na: 140, K: 4.3, Cl: 107, CO2: 25, BUN: 25, Cr: 0.6, Glu: 80, Ca: 9   BMP: Date: 7/7/22, Na: 141, K: 4.6, Cl: 105, CO2: 26, BUN: 30, Cr: 0.7, Glu: 113, Ca: 9.1   Hepatic Function Tests: Date: 3/8/22, AST: 15, ALT: 11, Alk Phos: 114, T Bili: 0.4, Albumin: 3.6   Hepatic Function Tests: Date: 3/9/21, AST: 15, ALT: 14, Alk Phos: 101, T Bili: 0.6, Albumin: 3.3   Lipid panel on 10/13/20 as follows: Cholesterol 186; triglyceride 84; HDL 51; ; VLDL 17.    Vitamin D 25 hydroxy level on 10/13/20 22 L    BMP: Date: 1/10/2023 Na 142 K 4.4 Cr 0.7 BUN 27 glucose 77 Ca 9.2 GFR 79  CBC: Date: 1/10/2023 WBC 6.9 Hgb 12.8 hematocrit 38.4 platelets 398  Liver function: Date: 1/10/2023 ALT 11 AST 18 alkaline phos.  112 bilirubin 0.5 albumin 3.7 protein 6.4    BMP: Date: 4/18/2023 Na 145 K 4 Cr 0.7 BUN 25 glucose 85 Ca 8.8 GFR 79  CBC: Date: 4/18/2023 WBC 5.2 Hgb 12.6 hematocrit 38.3 platelets 298  Liver function: Date: 4/18/2023 ALT 6 AST 11 alkaline phos.  94 bilirubin 0.5 albumin 3.4 protein 5.7   4/18/23 vitamin D 25 hydroxy level 19.62 L        /74   Pulse 72   Temp 36.2 °C (97.1 °F)   Resp 18   Ht 1.6 m (5' 3\")   Wt 58.2 kg (128 lb 6.4 oz)   SpO2 98%   BMI 22.75 kg/m²      Physical Exam  Vitals and nursing note reviewed.   Constitutional:       Appearance: Normal appearance.   HENT:      Head: Normocephalic.      Right Ear: External ear normal.      Left Ear: External ear normal.      Nose: Nose normal.      Mouth/Throat:      Mouth: Mucous membranes are moist.      Pharynx: Oropharynx is clear.   Eyes:      Extraocular Movements: Extraocular movements intact.      Conjunctiva/sclera: Conjunctivae normal.      Pupils: Pupils are equal, round, and reactive to light. "   Cardiovascular:      Rate and Rhythm: Normal rate and regular rhythm.      Pulses: Normal pulses.      Heart sounds: Normal heart sounds.   Pulmonary:      Effort: Pulmonary effort is normal.      Breath sounds: Normal breath sounds.   Abdominal:      General: Bowel sounds are normal.      Palpations: Abdomen is soft.   Musculoskeletal:         General: Normal range of motion.      Cervical back: Normal range of motion and neck supple.      Comments: Generalized muscle weakness; impaired gait and mobility   Skin:     General: Skin is warm and dry.   Neurological:      Mental Status: She is alert. Mental status is at baseline. She is disoriented.      Motor: Weakness present.      Coordination: Coordination abnormal.      Gait: Gait abnormal.   Psychiatric:         Mood and Affect: Affect is inappropriate.         Speech: Speech is tangential.         Cognition and Memory: Cognition is impaired. Memory is impaired.      Comments: Dementia   A x O x 1          Assessment/Plan      Ordered CMP, CBC with diff. , vitamin D 25 hydroxy level for 8/7/23.        Alzheimer's disease; Dementia:      Continue Aricept.   Follow up with in-house psych NP at [facility]  Monitor for progression of dementia.  Monitor for behaviors.         Benign essential HTN; Chronic diastolic heart disease; :       Monitor Bps.    Vitamin D deficiency:  Continue vitamin D3 2000 units PO every day.  Order/ monitor vitamin D 25 hydroxy level      Immobility; Generalized muscle weakness:    In wheelchair.   Spends most of the day sitting in a wheelchair.   Able to propel her wheelchair independently.  Fall prevention strategies.     Weight loss:  Monitor weights.   Dietician to follow patient in-house.      Sequela of  CVA/left sided weakness:  Patient had a stroke in October, 2020.  Continue ASA and Plavix.  Requires full supportive care.             Problem List Items Addressed This Visit    None  Visit Diagnoses       Alzheimer disease (CMS/[facility])    -   Primary    Severe Alzheimer's dementia with other behavioral disturbance, unspecified timing of dementia onset (CMS/[facility])        Benign essential HTN        Chronic congestive heart failure, unspecified heart failure type (CMS/[facility])        Vitamin D deficiency        Immobility        Generalized muscle weakness        Weight loss        Sequela of cerebrovascular accident        Left-sided weakness              Sandi Rudolph, APRN-CNP

## 2023-08-12 LAB
ALANINE AMINOTRANSFERASE (SGPT) (U/L) IN SER/PLAS: 8 U/L (ref 7–45)
ALBUMIN (G/DL) IN SER/PLAS: 3.6 G/DL (ref 3.4–5)
ALKALINE PHOSPHATASE (U/L) IN SER/PLAS: 108 U/L (ref 33–136)
ANION GAP IN SER/PLAS: 16 MMOL/L (ref 10–20)
ASPARTATE AMINOTRANSFERASE (SGOT) (U/L) IN SER/PLAS: 14 U/L (ref 9–39)
BASOPHILS (10*3/UL) IN BLOOD BY AUTOMATED COUNT: 0.02 X10E9/L (ref 0–0.1)
BASOPHILS/100 LEUKOCYTES IN BLOOD BY AUTOMATED COUNT: 0.2 % (ref 0–2)
BILIRUBIN TOTAL (MG/DL) IN SER/PLAS: 0.4 MG/DL (ref 0–1.2)
CALCIUM (MG/DL) IN SER/PLAS: 9 MG/DL (ref 8.6–10.3)
CARBON DIOXIDE, TOTAL (MMOL/L) IN SER/PLAS: 21 MMOL/L (ref 21–32)
CHLORIDE (MMOL/L) IN SER/PLAS: 106 MMOL/L (ref 98–107)
CREATININE (MG/DL) IN SER/PLAS: 0.81 MG/DL (ref 0.5–1.05)
EOSINOPHILS (10*3/UL) IN BLOOD BY AUTOMATED COUNT: 0.09 X10E9/L (ref 0–0.4)
EOSINOPHILS/100 LEUKOCYTES IN BLOOD BY AUTOMATED COUNT: 1 % (ref 0–6)
ERYTHROCYTE DISTRIBUTION WIDTH (RATIO) BY AUTOMATED COUNT: 14.6 % (ref 11.5–14.5)
ERYTHROCYTE MEAN CORPUSCULAR HEMOGLOBIN CONCENTRATION (G/DL) BY AUTOMATED: 30.5 G/DL (ref 32–36)
ERYTHROCYTE MEAN CORPUSCULAR VOLUME (FL) BY AUTOMATED COUNT: 97 FL (ref 80–100)
ERYTHROCYTES (10*6/UL) IN BLOOD BY AUTOMATED COUNT: 4.28 X10E12/L (ref 4–5.2)
GFR FEMALE: 69 ML/MIN/1.73M2
GLUCOSE (MG/DL) IN SER/PLAS: 88 MG/DL (ref 74–99)
HEMATOCRIT (%) IN BLOOD BY AUTOMATED COUNT: 41.6 % (ref 36–46)
HEMOGLOBIN (G/DL) IN BLOOD: 12.7 G/DL (ref 12–16)
IMMATURE GRANULOCYTES/100 LEUKOCYTES IN BLOOD BY AUTOMATED COUNT: 0.3 % (ref 0–0.9)
LEUKOCYTES (10*3/UL) IN BLOOD BY AUTOMATED COUNT: 9.1 X10E9/L (ref 4.4–11.3)
LYMPHOCYTES (10*3/UL) IN BLOOD BY AUTOMATED COUNT: 2.18 X10E9/L (ref 0.8–3)
LYMPHOCYTES/100 LEUKOCYTES IN BLOOD BY AUTOMATED COUNT: 23.9 % (ref 13–44)
MONOCYTES (10*3/UL) IN BLOOD BY AUTOMATED COUNT: 0.55 X10E9/L (ref 0.05–0.8)
MONOCYTES/100 LEUKOCYTES IN BLOOD BY AUTOMATED COUNT: 6 % (ref 2–10)
NEUTROPHILS (10*3/UL) IN BLOOD BY AUTOMATED COUNT: 6.25 X10E9/L (ref 1.6–5.5)
NEUTROPHILS/100 LEUKOCYTES IN BLOOD BY AUTOMATED COUNT: 68.6 % (ref 40–80)
PLATELETS (10*3/UL) IN BLOOD AUTOMATED COUNT: 375 X10E9/L (ref 150–450)
POTASSIUM (MMOL/L) IN SER/PLAS: 4.1 MMOL/L (ref 3.5–5.3)
PROTEIN TOTAL: 6.1 G/DL (ref 6.4–8.2)
SODIUM (MMOL/L) IN SER/PLAS: 139 MMOL/L (ref 136–145)
UREA NITROGEN (MG/DL) IN SER/PLAS: 30 MG/DL (ref 6–23)

## 2023-08-18 ENCOUNTER — NURSING HOME VISIT (OUTPATIENT)
Dept: POST ACUTE CARE | Facility: EXTERNAL LOCATION | Age: 88
End: 2023-08-18
Payer: COMMERCIAL

## 2023-08-18 DIAGNOSIS — F02.80 ALZHEIMERS DISEASE (MULTI): Primary | ICD-10-CM

## 2023-08-18 DIAGNOSIS — F31.9 BIPOLAR DEPRESSION (MULTI): ICD-10-CM

## 2023-08-18 DIAGNOSIS — I10 BENIGN ESSENTIAL HYPERTENSION: ICD-10-CM

## 2023-08-18 DIAGNOSIS — G30.9 ALZHEIMERS DISEASE (MULTI): Primary | ICD-10-CM

## 2023-08-18 PROCEDURE — 99308 SBSQ NF CARE LOW MDM 20: CPT | Performed by: INTERNAL MEDICINE

## 2023-08-18 NOTE — LETTER
Patient: Shonda Solano  : 3/14/1934    Encounter Date: 2023    Subjective: Ms Solano is a 89-year-old woman with history of Alzheimer's dementia,CVA().   She is a long-term resident at [facility]. She is confused   No recent falls. Sitting in dining [place]. No concerns by staff.   ROS: no changes in weight   Chest- no pain orpalpitations   Lungs- no cough or SOB   Abd- no pain, no N/V/D   Ext- no swelling   Psych- memory impaired   Vital signs: /74   Temperature 97.1   Pulse 72   Weight 109.2 pounds   Gen- NAD, pleasantly confused   Neck- supple, no jvd   Lungs: Lungs clear toauscultation   Cardio: S1-S2 normal   ABD: Soft nontender bowel sounds present   Musc/Skel: No deformities or edema   Extremities: No edema   Neuro- alert, no gross deformity   Psych: Memory impaired   Assessment and Plan:   CVA-stable   Continuewith aspirin, Plavix   continue with fall and safety precautions   Alzheimer's dementia-stable   Continue with Aricept   Depression- followed by Psych   Continue with supportive care      Electronically Signed By: Indiana Wen MD   23 12:41 PM

## 2023-08-25 PROBLEM — F31.9 BIPOLAR DEPRESSION (MULTI): Status: ACTIVE | Noted: 2023-08-25

## 2023-08-25 PROBLEM — I50.32 CHRONIC DIASTOLIC HEART FAILURE (MULTI): Status: ACTIVE | Noted: 2023-08-25

## 2023-08-25 PROBLEM — F02.80 ALZHEIMERS DISEASE (MULTI): Status: ACTIVE | Noted: 2023-08-25

## 2023-08-25 PROBLEM — F03.90 DEMENTIA (MULTI): Status: ACTIVE | Noted: 2023-08-25

## 2023-08-25 PROBLEM — I10 BENIGN ESSENTIAL HYPERTENSION: Status: ACTIVE | Noted: 2023-08-25

## 2023-08-25 PROBLEM — N18.9 CKD (CHRONIC KIDNEY DISEASE): Status: ACTIVE | Noted: 2023-08-25

## 2023-08-25 PROBLEM — G30.9 ALZHEIMERS DISEASE (MULTI): Status: ACTIVE | Noted: 2023-08-25

## 2023-08-25 NOTE — PROGRESS NOTES
Subjective: Ms Solano is a 89-year-old woman with history of Alzheimer's dementia,CVA(2020).   She is a long-term resident at [facility]. She is confused   No recent falls. Sitting in [place]. No concerns by staff.   ROS: no changes in weight   Chest- no pain orpalpitations   Lungs- no cough or SOB   Abd- no pain, no N/V/D   Ext- no swelling   Psych- memory impaired   Vital signs: /74   Temperature 97.1   Pulse 72   Weight 109.2 pounds   Gen- NAD, pleasantly confused   Neck- supple, no jvd   Lungs: Lungs clear toauscultation   Cardio: S1-S2 normal   ABD: Soft nontender bowel sounds present   Musc/Skel: No deformities or edema   Extremities: No edema   Neuro- alert, no gross deformity   Psych: Memory impaired   Assessment and Plan:   CVA-stable   Continuewith aspirin, Plavix   continue with fall and safety precautions   Alzheimer's dementia-stable   Continue with Aricept   Depression- followed by Psych   Continue with supportive care

## 2023-09-07 ENCOUNTER — NURSING HOME VISIT (OUTPATIENT)
Dept: POST ACUTE CARE | Facility: EXTERNAL LOCATION | Age: 88
End: 2023-09-07
Payer: COMMERCIAL

## 2023-09-07 DIAGNOSIS — I10 BENIGN ESSENTIAL HTN: ICD-10-CM

## 2023-09-07 DIAGNOSIS — M62.81 GENERALIZED MUSCLE WEAKNESS: ICD-10-CM

## 2023-09-07 DIAGNOSIS — Z74.09 IMMOBILITY: ICD-10-CM

## 2023-09-07 DIAGNOSIS — G30.9 ALZHEIMER DISEASE (MULTI): ICD-10-CM

## 2023-09-07 DIAGNOSIS — G30.9 SEVERE ALZHEIMER'S DEMENTIA WITH OTHER BEHAVIORAL DISTURBANCE, UNSPECIFIED TIMING OF DEMENTIA ONSET (MULTI): ICD-10-CM
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
## 2023-09-07 DIAGNOSIS — F02.C18 SEVERE ALZHEIMER'S DEMENTIA WITH OTHER BEHAVIORAL DISTURBANCE, UNSPECIFIED TIMING OF DEMENTIA ONSET (MULTI): ICD-10-CM

## 2023-09-07 DIAGNOSIS — E55.9 VITAMIN D DEFICIENCY: Primary | ICD-10-CM

## 2023-09-07 DIAGNOSIS — R63.4 WEIGHT LOSS: ICD-10-CM

## 2023-09-07 DIAGNOSIS — I50.9 CHRONIC CONGESTIVE HEART FAILURE, UNSPECIFIED HEART FAILURE TYPE (MULTI): ICD-10-CM

## 2023-09-07 DIAGNOSIS — F02.80 ALZHEIMER DISEASE (MULTI): ICD-10-CM
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
## 2023-09-07 DIAGNOSIS — I69.30 SEQUELA OF CEREBROVASCULAR ACCIDENT: ICD-10-CM

## 2023-09-07 PROCEDURE — 99309 SBSQ NF CARE MODERATE MDM 30: CPT | Performed by: NURSE PRACTITIONER

## 2023-09-07 NOTE — LETTER
Patient: Shonda Solano  : 3/14/1934    Encounter Date: 2023    Name: Shonda Solano    : 3/14/1934    Code Status:  DNR-cc    Chief Complaint:  Vitamin D deficiency; recent fall; etc...    HPI   89 year old female patient at [facility] in Long Term Care.  She resides in the [facility].     Medical history includes: Difficulty in walking; muscle weakness (generalized); Acquired hammer toes on the right & left foot; Personal history of other infectious and parasitic diseases; disorientation; Alzheimer's disease; unspecified; symbolic dysfunctions; other speech and language deficits following unspecified cerebrovascular disease; mood disorder due to known physiological condition, unspecified; Bipolar disorder; Displaced intertrochanteric fracture of right femur; need for assistance with personal care; chronic kidney disease; primary osteoarthritis; unspecified ankle and foot; polyneuropathy, unspecified; unspecified abnormalities of gait and mobility; other osteoporosis without current pathological fracture.      Chronic comorbidities as follows:     Vitamin D deficiency:  Patient is currently on Vitamin D 1000 units PO every day.   Recent vitamin D hydroxy level on 23 29.92 L  On 23 vitamin D 25 hydroxy level was 19.62 L  Patient seen today she is sitting up in her wheelchair.  Mentation at baseline.  Calm, cooperative, pleasant.   No complaints of chest pain.  No headaches no dizziness.     Recent fall:  Staff reports patient fell yesterday.  No complaints of any related pain or injuries reported.  No complaints from patient today.         Dementia; Alzheimer's disease :   On Aricept.   Also follows up with in-house psych NP at [facility]  Calm, cooperative, pleasantly confused.   A x O x 1.   No agitation reported.   Follows simple commands.        Benign essential HTN; Chronic diastolic heart disease; :   Recent /69  Not on any medications for HTN.   No  increased edema or SOB reported.   No complaints of chest pain, SOB, headaches or dizziness.    Generalized muscle weakness;  Immobility; :    In wheelchair. Spends most of the day sitting in a wheelchair.   Able to propel her wheelchair independently.    Weight loss:  Recent weights:  23 106.4 #  7/3/23 128.4 #  23 138.6 #  23 136.2 #  23 139.4 #    Sequela of  CVA/left sided weakness:  Patient had a stroke in .  On ASA and Plavix.  Requires full supportive care.                  Reviewed  EMR  Reviewed medical, social, surgical and family history.  Reviewed all current medications and performed medication reconciliation.  Performed prescription drug management.  Reviewed vital signs AND lab results  Reviewed [facility] Care Documentation  Discussed patient with nursing.       ROS: Limited ROS due to mentation; ROS negative unless noted in HPI.     Surgical History  Problems    · History of Femur fracture repair   · History of Tonsillectomy     Family History  Mother    · Family history of   Father    · Family history of      Social History   · Never a smoker  No alcohol  No illicit drugs  Lives in long term care facility              Past Medical History:   Diagnosis Date   • Other malaise 2020    Physical deconditioning   • Personal history of other diseases of the digestive system     History of constipation   • Personal history of other diseases of the nervous system and sense organs 2020    History of polyneuropathy   • Personal history of urinary (tract) infections     History of urinary tract infection   • Unspecified dementia, unspecified severity, without behavioral disturbance, psychotic disturbance, mood disturbance, and anxiety (CMS/[facility]) 12/10/2022    Dementia   • Unspecified diastolic (congestive) heart failure (CMS/[facility])     Diastolic heart failure   • Unspecified fracture of right femur, initial encounter for closed fracture (CMS/[facility])      Femur fracture, right             Lab Results   Component Value Date    WBC 9.1 08/12/2023    HGB 12.7 08/12/2023    HCT 41.6 08/12/2023     08/12/2023    ALT 8 08/12/2023    AST 14 08/12/2023     08/12/2023    K 4.1 08/12/2023     08/12/2023    CREATININE 0.81 08/12/2023    BUN 30 (H) 08/12/2023    CO2 21 08/12/2023    TSH 2.92 10/14/2020    INR 1.0 02/20/2022    HGBA1C 5.9 10/15/2020     Outside Labs  CBC: Date: 10/12/22, WBC: 4.8, Hgb: 11.9, Hct: 35.2, PLT: 290   CBC: Date: 7/7/22, WBC: 3.9, Hgb: 12.7, Hct: 38.5, PLT: 307   BMP: Date: 10/12/22, Na: 140, K: 4.3, Cl: 107, CO2: 25, BUN: 25, Cr: 0.6, Glu: 80, Ca: 9   BMP: Date: 7/7/22, Na: 141, K: 4.6, Cl: 105, CO2: 26, BUN: 30, Cr: 0.7, Glu: 113, Ca: 9.1   Hepatic Function Tests: Date: 3/8/22, AST: 15, ALT: 11, Alk Phos: 114, T Bili: 0.4, Albumin: 3.6   Hepatic Function Tests: Date: 3/9/21, AST: 15, ALT: 14, Alk Phos: 101, T Bili: 0.6, Albumin: 3.3   Lipid panel on 10/13/20 as follows: Cholesterol 186; triglyceride 84; HDL 51; ; VLDL 17.    Vitamin D 25 hydroxy level on 10/13/20 22 L    BMP: Date: 1/10/2023 Na 142 K 4.4 Cr 0.7 BUN 27 glucose 77 Ca 9.2 GFR 79  CBC: Date: 1/10/2023 WBC 6.9 Hgb 12.8 hematocrit 38.4 platelets 398  Liver function: Date: 1/10/2023 ALT 11 AST 18 alkaline phos.  112 bilirubin 0.5 albumin 3.7 protein 6.4    BMP: Date: 4/18/2023 Na 145 K 4 Cr 0.7 BUN 25 glucose 85 Ca 8.8 GFR 79  CBC: Date: 4/18/2023 WBC 5.2 Hgb 12.6 hematocrit 38.3 platelets 298  Liver function: Date: 4/18/2023 ALT 6 AST 11 alkaline phos.  94 bilirubin 0.5 albumin 3.4 protein 5.7   4/18/23 vitamin D 25 hydroxy level 19.62 L    BMP: Date: 8/8/2023 Na 142 K 4.9 Cr 0.6 BUN 24 glucose 65 Ca 9.2 GFR 94  CBC: Date: 8/8/2023 WBC 5.4 Hgb 12.8 hematocrit 40.4 platelets 398  Liver function: Date: 8/8/2023 ALT 8 AST 18 alkaline phos.  108 bilirubin 0.6 albumin 3.7 protein 6.4    8/8/23 vitamin D hydroxy level 29.92 L          /69   Pulse 74   Temp 36.3  "°C (97.4 °F)   Resp 18   Ht 1.6 m (5' 3\")   Wt 48.1 kg (106 lb)   SpO2 97%   BMI 18.78 kg/m²      Physical Exam  Vitals and nursing note reviewed.   Constitutional:       Appearance: Normal appearance.   HENT:      Head: Normocephalic.      Right Ear: External ear normal.      Left Ear: External ear normal.      Nose: Nose normal.      Mouth/Throat:      Mouth: Mucous membranes are moist.      Pharynx: Oropharynx is clear.   Eyes:      Extraocular Movements: Extraocular movements intact.      Conjunctiva/sclera: Conjunctivae normal.      Pupils: Pupils are equal, round, and reactive to light.   Cardiovascular:      Rate and Rhythm: Normal rate and regular rhythm.      Pulses: Normal pulses.      Heart sounds: Normal heart sounds.   Pulmonary:      Effort: Pulmonary effort is normal.      Breath sounds: Normal breath sounds.   Abdominal:      General: Bowel sounds are normal.      Palpations: Abdomen is soft.   Musculoskeletal:         General: Normal range of motion.      Cervical back: Normal range of motion and neck supple.      Comments: Generalized muscle weakness; impaired gait and mobility   Skin:     General: Skin is warm and dry.   Neurological:      Mental Status: She is alert. Mental status is at baseline. She is disoriented.      Motor: Weakness present.      Coordination: Coordination abnormal.      Gait: Gait abnormal.   Psychiatric:         Mood and Affect: Affect is inappropriate.         Speech: Speech is tangential.         Cognition and Memory: Cognition is impaired. Memory is impaired.      Comments: Dementia   A x O x 1          Assessment/Plan     Vitamin D deficiency:  DC Vitamin D 1000 units PO every day.   Start vitamin D 5000 units PO every day.  Recheck vitamin D 25 hydroxy level in 8 weeks.    Recent fall:      Monitor for any s/s of injury.      Fall prevention strategies.        Dementia; Alzheimer's disease :   Continue Aricept.   Follow up with in-house psych NP at [facility]        " Benign essential HTN; Chronic diastolic heart disease; :   Monitor Bps.  Not on any medications for HTN.     Generalized muscle weakness;  Immobility; :    In wheelchair.   Able to propel her wheelchair independently.  Fall prevention strategies.    Weight loss:      Monitor weights.    Sequela of  CVA/left sided weakness:      Continue ASA and Plavix.            Problem List Items Addressed This Visit          Neuro    Dementia (CMS/[facility])     Other Visit Diagnoses       Vitamin D deficiency    -  Primary    Fall at nursing home, initial encounter        Alzheimer disease (CMS/[facility])        Benign essential HTN        Chronic congestive heart failure, unspecified heart failure type (CMS/[facility])        Generalized muscle weakness        Immobility        Sequela of cerebrovascular accident        Weight loss            YIMI Jordan       Electronically Signed By: YIMI Jordan   9/9/23  4:32 PM

## 2023-09-09 VITALS
SYSTOLIC BLOOD PRESSURE: 132 MMHG | TEMPERATURE: 97.4 F | WEIGHT: 106 LBS | HEART RATE: 74 BPM | RESPIRATION RATE: 18 BRPM | BODY MASS INDEX: 18.78 KG/M2 | HEIGHT: 63 IN | OXYGEN SATURATION: 97 % | DIASTOLIC BLOOD PRESSURE: 69 MMHG

## 2023-09-09 NOTE — PROGRESS NOTES
Name: Shonda Solano    : 3/14/1934    Code Status:  DNR-cc    Chief Complaint:  Vitamin D deficiency; recent fall; etc...    HPI   89 year old female patient at [facility] in Long Term Care.  She resides in the [facility].     Medical history includes: Difficulty in walking; muscle weakness (generalized); Acquired hammer toes on the right & left foot; Personal history of other infectious and parasitic diseases; disorientation; Alzheimer's disease; unspecified; symbolic dysfunctions; other speech and language deficits following unspecified cerebrovascular disease; mood disorder due to known physiological condition, unspecified; Bipolar disorder; Displaced intertrochanteric fracture of right femur; need for assistance with personal care; chronic kidney disease; primary osteoarthritis; unspecified ankle and foot; polyneuropathy, unspecified; unspecified abnormalities of gait and mobility; other osteoporosis without current pathological fracture.      Chronic comorbidities as follows:     Vitamin D deficiency:  Patient is currently on Vitamin D 1000 units PO every day.   Recent vitamin D hydroxy level on 23 29.92 L  On 23 vitamin D 25 hydroxy level was 19.62 L  Patient seen today she is sitting up in her wheelchair.  Mentation at baseline.  Calm, cooperative, pleasant.   No complaints of chest pain.  No headaches no dizziness.     Recent fall:  Staff reports patient fell yesterday.  No complaints of any related pain or injuries reported.  No complaints from patient today.         Dementia; Alzheimer's disease :   On Aricept.   Also follows up with in-house psych NP at [facility]  Calm, cooperative, pleasantly confused.   A x O x 1.   No agitation reported.   Follows simple commands.        Benign essential HTN; Chronic diastolic heart disease; :   Recent /69  Not on any medications for HTN.   No increased edema or SOB reported.   No complaints of chest pain, SOB, headaches or  dizziness.    Generalized muscle weakness;  Immobility; :    In wheelchair. Spends most of the day sitting in a wheelchair.   Able to propel her wheelchair independently.    Weight loss:  Recent weights:  23 106.4 #  7/3/23 128.4 #  23 138.6 #  23 136.2 #  23 139.4 #    Sequela of  CVA/left sided weakness:  Patient had a stroke in .  On ASA and Plavix.  Requires full supportive care.                  Reviewed  EMR  Reviewed medical, social, surgical and family history.  Reviewed all current medications and performed medication reconciliation.  Performed prescription drug management.  Reviewed vital signs AND lab results  Reviewed [facility] Care Documentation  Discussed patient with nursing.       ROS: Limited ROS due to mentation; ROS negative unless noted in HPI.     Surgical History  Problems    · History of Femur fracture repair   · History of Tonsillectomy     Family History  Mother    · Family history of   Father    · Family history of      Social History   · Never a smoker  No alcohol  No illicit drugs  Lives in long term care facility              Past Medical History:   Diagnosis Date    Other malaise 2020    Physical deconditioning    Personal history of other diseases of the digestive system     History of constipation    Personal history of other diseases of the nervous system and sense organs 2020    History of polyneuropathy    Personal history of urinary (tract) infections     History of urinary tract infection    Unspecified dementia, unspecified severity, without behavioral disturbance, psychotic disturbance, mood disturbance, and anxiety (CMS/[facility]) 12/10/2022    Dementia    Unspecified diastolic (congestive) heart failure (CMS/[facility])     Diastolic heart failure    Unspecified fracture of right femur, initial encounter for closed fracture (CMS/[facility])     Femur fracture, right             Lab Results   Component Value Date    WBC 9.1 2023  "   HGB 12.7 08/12/2023    HCT 41.6 08/12/2023     08/12/2023    ALT 8 08/12/2023    AST 14 08/12/2023     08/12/2023    K 4.1 08/12/2023     08/12/2023    CREATININE 0.81 08/12/2023    BUN 30 (H) 08/12/2023    CO2 21 08/12/2023    TSH 2.92 10/14/2020    INR 1.0 02/20/2022    HGBA1C 5.9 10/15/2020     Outside Labs  CBC: Date: 10/12/22, WBC: 4.8, Hgb: 11.9, Hct: 35.2, PLT: 290   CBC: Date: 7/7/22, WBC: 3.9, Hgb: 12.7, Hct: 38.5, PLT: 307   BMP: Date: 10/12/22, Na: 140, K: 4.3, Cl: 107, CO2: 25, BUN: 25, Cr: 0.6, Glu: 80, Ca: 9   BMP: Date: 7/7/22, Na: 141, K: 4.6, Cl: 105, CO2: 26, BUN: 30, Cr: 0.7, Glu: 113, Ca: 9.1   Hepatic Function Tests: Date: 3/8/22, AST: 15, ALT: 11, Alk Phos: 114, T Bili: 0.4, Albumin: 3.6   Hepatic Function Tests: Date: 3/9/21, AST: 15, ALT: 14, Alk Phos: 101, T Bili: 0.6, Albumin: 3.3   Lipid panel on 10/13/20 as follows: Cholesterol 186; triglyceride 84; HDL 51; ; VLDL 17.    Vitamin D 25 hydroxy level on 10/13/20 22 L    BMP: Date: 1/10/2023 Na 142 K 4.4 Cr 0.7 BUN 27 glucose 77 Ca 9.2 GFR 79  CBC: Date: 1/10/2023 WBC 6.9 Hgb 12.8 hematocrit 38.4 platelets 398  Liver function: Date: 1/10/2023 ALT 11 AST 18 alkaline phos.  112 bilirubin 0.5 albumin 3.7 protein 6.4    BMP: Date: 4/18/2023 Na 145 K 4 Cr 0.7 BUN 25 glucose 85 Ca 8.8 GFR 79  CBC: Date: 4/18/2023 WBC 5.2 Hgb 12.6 hematocrit 38.3 platelets 298  Liver function: Date: 4/18/2023 ALT 6 AST 11 alkaline phos.  94 bilirubin 0.5 albumin 3.4 protein 5.7   4/18/23 vitamin D 25 hydroxy level 19.62 L    BMP: Date: 8/8/2023 Na 142 K 4.9 Cr 0.6 BUN 24 glucose 65 Ca 9.2 GFR 94  CBC: Date: 8/8/2023 WBC 5.4 Hgb 12.8 hematocrit 40.4 platelets 398  Liver function: Date: 8/8/2023 ALT 8 AST 18 alkaline phos.  108 bilirubin 0.6 albumin 3.7 protein 6.4    8/8/23 vitamin D hydroxy level 29.92 L          /69   Pulse 74   Temp 36.3 °C (97.4 °F)   Resp 18   Ht 1.6 m (5' 3\")   Wt 48.1 kg (106 lb)   SpO2 97%   BMI 18.78 " kg/m²      Physical Exam  Vitals and nursing note reviewed.   Constitutional:       Appearance: Normal appearance.   HENT:      Head: Normocephalic.      Right Ear: External ear normal.      Left Ear: External ear normal.      Nose: Nose normal.      Mouth/Throat:      Mouth: Mucous membranes are moist.      Pharynx: Oropharynx is clear.   Eyes:      Extraocular Movements: Extraocular movements intact.      Conjunctiva/sclera: Conjunctivae normal.      Pupils: Pupils are equal, round, and reactive to light.   Cardiovascular:      Rate and Rhythm: Normal rate and regular rhythm.      Pulses: Normal pulses.      Heart sounds: Normal heart sounds.   Pulmonary:      Effort: Pulmonary effort is normal.      Breath sounds: Normal breath sounds.   Abdominal:      General: Bowel sounds are normal.      Palpations: Abdomen is soft.   Musculoskeletal:         General: Normal range of motion.      Cervical back: Normal range of motion and neck supple.      Comments: Generalized muscle weakness; impaired gait and mobility   Skin:     General: Skin is warm and dry.   Neurological:      Mental Status: She is alert. Mental status is at baseline. She is disoriented.      Motor: Weakness present.      Coordination: Coordination abnormal.      Gait: Gait abnormal.   Psychiatric:         Mood and Affect: Affect is inappropriate.         Speech: Speech is tangential.         Cognition and Memory: Cognition is impaired. Memory is impaired.      Comments: Dementia   A x O x 1          Assessment/Plan      Vitamin D deficiency:  DC Vitamin D 1000 units PO every day.   Start vitamin D 5000 units PO every day.  Recheck vitamin D 25 hydroxy level in 8 weeks.    Recent fall:      Monitor for any s/s of injury.      Fall prevention strategies.        Dementia; Alzheimer's disease :   Continue Aricept.   Follow up with in-house psych NP at [facility]        Benign essential HTN; Chronic diastolic heart disease; :   Monitor Bps.  Not on any medications  for HTN.     Generalized muscle weakness;  Immobility; :    In wheelchair.   Able to propel her wheelchair independently.  Fall prevention strategies.    Weight loss:      Monitor weights.    Sequela of  CVA/left sided weakness:      Continue ASA and Plavix.            Problem List Items Addressed This Visit          Neuro    Dementia (CMS/[facility])     Other Visit Diagnoses       Vitamin D deficiency    -  Primary    Fall at nursing home, initial encounter        Alzheimer disease (CMS/[facility])        Benign essential HTN        Chronic congestive heart failure, unspecified heart failure type (CMS/[facility])        Generalized muscle weakness        Immobility        Sequela of cerebrovascular accident        Weight loss            Sandi Rudolph, APRN-CNP

## 2023-10-05 ENCOUNTER — NURSING HOME VISIT (OUTPATIENT)
Dept: POST ACUTE CARE | Facility: EXTERNAL LOCATION | Age: 88
End: 2023-10-05
Payer: COMMERCIAL

## 2023-10-05 DIAGNOSIS — Z74.09 IMMOBILITY: ICD-10-CM

## 2023-10-05 DIAGNOSIS — R63.4 WEIGHT LOSS: ICD-10-CM

## 2023-10-05 DIAGNOSIS — M62.81 GENERALIZED MUSCLE WEAKNESS: ICD-10-CM

## 2023-10-05 DIAGNOSIS — G30.9 SEVERE ALZHEIMER'S DEMENTIA WITH OTHER BEHAVIORAL DISTURBANCE, UNSPECIFIED TIMING OF DEMENTIA ONSET (MULTI): Primary | ICD-10-CM

## 2023-10-05 DIAGNOSIS — I69.30 SEQUELA OF CEREBROVASCULAR ACCIDENT: ICD-10-CM

## 2023-10-05 DIAGNOSIS — F02.C18 SEVERE ALZHEIMER'S DEMENTIA WITH OTHER BEHAVIORAL DISTURBANCE, UNSPECIFIED TIMING OF DEMENTIA ONSET (MULTI): Primary | ICD-10-CM

## 2023-10-05 PROCEDURE — 99308 SBSQ NF CARE LOW MDM 20: CPT | Performed by: NURSE PRACTITIONER

## 2023-10-05 NOTE — LETTER
Patient: Shonda Solano  : 3/14/1934    Encounter Date: 10/05/2023    Name: Shonda Solano    : 3/14/1934    Code Status:  DNR-cc    Chief Complaint:  Follow up on dementia; etc...    HPI   89 year old female patient at [facility] in Long Term Care.  She resides in the [facility].     Medical history includes: Difficulty in walking; muscle weakness (generalized); Acquired hammer toes on the right & left foot; Personal history of other infectious and parasitic diseases; disorientation; Alzheimer's disease; unspecified; symbolic dysfunctions; other speech and language deficits following unspecified cerebrovascular disease; mood disorder due to known physiological condition, unspecified; Bipolar disorder; Displaced intertrochanteric fracture of right femur; need for assistance with personal care; chronic kidney disease; primary osteoarthritis; unspecified ankle and foot; polyneuropathy, unspecified; unspecified abnormalities of gait and mobility; other osteoporosis without current pathological fracture.      Chronic comorbidities as follows:         Dementia; Alzheimer's disease :   On Aricept.   Also follows up with in-house psych NP at .  Patient seen today, she is sitting up in her wheelchair.   Calm, cooperative, pleasantly confused.   A x O x 1.   No agitation reported.   Follows simple commands.  No complaints of chest pain.  No headaches or dizziness.  Mentation at baseline.        Weight loss:      Weights have been trending down.        On house supplements shakes TID   Recent weights:  10/4/23 106.6 #  23 106.4 #  7/3/23 128.4 #  23 138.6 #  23 136.2 #  23 139.4 #    Generalized muscle weakness;  Immobility; :    In wheelchair. Spends most of the day sitting in a wheelchair.   Able to propel her wheelchair independently.    Sequela of  CVA/left sided weakness:  Patient had a stroke in .  On ASA and Plavix.  Requires full supportive care.                   Reviewed  EMR  Reviewed medical, social, surgical and family history.  Reviewed all current medications and performed medication reconciliation.  Performed prescription drug management.  Reviewed vital signs AND lab results  Reviewed [facility] Documentation  Discussed patient with nursing.       ROS: Limited ROS due to mentation; ROS negative unless noted in HPI.     Surgical History  Problems    · History of Femur fracture repair   · History of Tonsillectomy     Family History  Mother    · Family history of   Father    · Family history of      Social History   · Never a smoker  No alcohol  No illicit drugs  Lives in long term care facility              Past Medical History:   Diagnosis Date   • Other malaise 2020    Physical deconditioning   • Personal history of other diseases of the digestive system     History of constipation   • Personal history of other diseases of the nervous system and sense organs 2020    History of polyneuropathy   • Personal history of urinary (tract) infections     History of urinary tract infection   • Unspecified dementia, unspecified severity, without behavioral disturbance, psychotic disturbance, mood disturbance, and anxiety (CMS/[facility]) 12/10/2022    Dementia   • Unspecified diastolic (congestive) heart failure (CMS/[facility])     Diastolic heart failure   • Unspecified fracture of right femur, initial encounter for closed fracture (CMS/[facility])     Femur fracture, right             Lab Results   Component Value Date    WBC 9.1 2023    HGB 12.7 2023    HCT 41.6 2023     2023    ALT 8 2023    AST 14 2023     2023    K 4.1 2023     2023    CREATININE 0.81 2023    BUN 30 (H) 2023    CO2 21 2023    TSH 2.92 10/14/2020    INR 1.0 2022    HGBA1C 5.9 10/15/2020     Outside Labs  CBC: Date: 10/12/22, WBC: 4.8, Hgb: 11.9, Hct: 35.2, PLT: 290   CBC: Date:  "7/7/22, WBC: 3.9, Hgb: 12.7, Hct: 38.5, PLT: 307   BMP: Date: 10/12/22, Na: 140, K: 4.3, Cl: 107, CO2: 25, BUN: 25, Cr: 0.6, Glu: 80, Ca: 9   BMP: Date: 7/7/22, Na: 141, K: 4.6, Cl: 105, CO2: 26, BUN: 30, Cr: 0.7, Glu: 113, Ca: 9.1   Hepatic Function Tests: Date: 3/8/22, AST: 15, ALT: 11, Alk Phos: 114, T Bili: 0.4, Albumin: 3.6   Hepatic Function Tests: Date: 3/9/21, AST: 15, ALT: 14, Alk Phos: 101, T Bili: 0.6, Albumin: 3.3   Lipid panel on 10/13/20 as follows: Cholesterol 186; triglyceride 84; HDL 51; ; VLDL 17.    Vitamin D 25 hydroxy level on 10/13/20 22 L    BMP: Date: 1/10/2023 Na 142 K 4.4 Cr 0.7 BUN 27 glucose 77 Ca 9.2 GFR 79  CBC: Date: 1/10/2023 WBC 6.9 Hgb 12.8 hematocrit 38.4 platelets 398  Liver function: Date: 1/10/2023 ALT 11 AST 18 alkaline phos.  112 bilirubin 0.5 albumin 3.7 protein 6.4    BMP: Date: 4/18/2023 Na 145 K 4 Cr 0.7 BUN 25 glucose 85 Ca 8.8 GFR 79  CBC: Date: 4/18/2023 WBC 5.2 Hgb 12.6 hematocrit 38.3 platelets 298  Liver function: Date: 4/18/2023 ALT 6 AST 11 alkaline phos.  94 bilirubin 0.5 albumin 3.4 protein 5.7   4/18/23 vitamin D 25 hydroxy level 19.62 L    BMP: Date: 8/8/2023 Na 142 K 4.9 Cr 0.6 BUN 24 glucose 65 Ca 9.2 GFR 94  CBC: Date: 8/8/2023 WBC 5.4 Hgb 12.8 hematocrit 40.4 platelets 398  Liver function: Date: 8/8/2023 ALT 8 AST 18 alkaline phos.  108 bilirubin 0.6 albumin 3.7 protein 6.4    8/8/23 vitamin D hydroxy level 29.92 L          /75   Pulse 82   Temp 36.1 °C (97 °F)   Resp 18   Ht 1.6 m (5' 3\")   Wt 48.2 kg (106 lb 3.2 oz)   SpO2 95%   BMI 18.81 kg/m²      Physical Exam  Vitals and nursing note reviewed.   Constitutional:       Appearance: Normal appearance.   HENT:      Head: Normocephalic.      Right Ear: External ear normal.      Left Ear: External ear normal.      Nose: Nose normal.      Mouth/Throat:      Mouth: Mucous membranes are moist.      Pharynx: Oropharynx is clear.   Eyes:      Extraocular Movements: Extraocular movements " intact.      Conjunctiva/sclera: Conjunctivae normal.      Pupils: Pupils are equal, round, and reactive to light.   Cardiovascular:      Rate and Rhythm: Normal rate and regular rhythm.      Pulses: Normal pulses.      Heart sounds: Normal heart sounds.   Pulmonary:      Effort: Pulmonary effort is normal.      Breath sounds: Normal breath sounds.   Abdominal:      General: Bowel sounds are normal.      Palpations: Abdomen is soft.   Musculoskeletal:         General: Normal range of motion.      Cervical back: Normal range of motion and neck supple.      Comments: Generalized muscle weakness; impaired gait and mobility   Skin:     General: Skin is warm and dry.   Neurological:      Mental Status: She is alert. Mental status is at baseline. She is disoriented.      Motor: Weakness present.      Coordination: Coordination abnormal.      Gait: Gait abnormal.   Psychiatric:         Mood and Affect: Affect is inappropriate.         Speech: Speech is tangential.         Cognition and Memory: Cognition is impaired. Memory is impaired.      Comments: Dementia   A x O x 1          Assessment/Plan   Ordered BMP, CBC with diff., Lipid profile, Magnesium level, TSH, Vitamin D hydroxy level for tomorrow.     Dementia; Alzheimer's disease :   Continue Aricept.   Follow up with in-house psych NP at [facility]  Monitor for progression of dementia.  Monitor for agitation and/or behaviors.       Weight loss:      Monitor weights.      Weights have been trending down.       Continue house supplements shakes TID   In house dietician to follow patient.    Generalized muscle weakness;  Immobility; :    In wheelchair.   Spends most of the day sitting in a wheelchair.   Able to propel her wheelchair independently.  Fall prevention strategies.     Sequela of  CVA/left sided weakness:  Patient had a stroke in October, 2020.  Continue ASA and Plavix.  Requires full supportive care.            Problem List Items Addressed This Visit          Neuro     Dementia (CMS/HCC) - Primary     Other Visit Diagnoses       Weight loss        Generalized muscle weakness        Immobility            YIMI Jordan       Electronically Signed By: YIMI Jordan   10/9/23 10:10 PM

## 2023-10-09 VITALS
TEMPERATURE: 97 F | HEART RATE: 82 BPM | DIASTOLIC BLOOD PRESSURE: 75 MMHG | BODY MASS INDEX: 18.82 KG/M2 | HEIGHT: 63 IN | OXYGEN SATURATION: 95 % | RESPIRATION RATE: 18 BRPM | WEIGHT: 106.2 LBS | SYSTOLIC BLOOD PRESSURE: 121 MMHG

## 2023-10-10 NOTE — PROGRESS NOTES
Name: Shonda Solano    : 3/14/1934    Code Status:  DNR-cc    Chief Complaint:  Follow up on dementia; etc...    HPI   89 year old female patient at [facility] in Long Term Care.  She resides in the [facility].     Medical history includes: Difficulty in walking; muscle weakness (generalized); Acquired hammer toes on the right & left foot; Personal history of other infectious and parasitic diseases; disorientation; Alzheimer's disease; unspecified; symbolic dysfunctions; other speech and language deficits following unspecified cerebrovascular disease; mood disorder due to known physiological condition, unspecified; Bipolar disorder; Displaced intertrochanteric fracture of right femur; need for assistance with personal care; chronic kidney disease; primary osteoarthritis; unspecified ankle and foot; polyneuropathy, unspecified; unspecified abnormalities of gait and mobility; other osteoporosis without current pathological fracture.      Chronic comorbidities as follows:         Dementia; Alzheimer's disease :   On Aricept.   Also follows up with in-house psych NP at .  Patient seen today, she is sitting up in her wheelchair.   Calm, cooperative, pleasantly confused.   A x O x 1.   No agitation reported.   Follows simple commands.  No complaints of chest pain.  No headaches or dizziness.  Mentation at baseline.        Weight loss:      Weights have been trending down.        On house supplements shakes TID   Recent weights:  10/4/23 106.6 #  23 106.4 #  7/3/23 128.4 #  23 138.6 #  23 136.2 #  23 139.4 #    Generalized muscle weakness;  Immobility; :    In wheelchair. Spends most of the day sitting in a wheelchair.   Able to propel her wheelchair independently.    Sequela of  CVA/left sided weakness:  Patient had a stroke in .  On ASA and Plavix.  Requires full supportive care.                  Reviewed  EMR  Reviewed medical, social, surgical and  family history.  Reviewed all current medications and performed medication reconciliation.  Performed prescription drug management.  Reviewed vital signs AND lab results  Reviewed [facility] Documentation  Discussed patient with nursing.       ROS: Limited ROS due to mentation; ROS negative unless noted in HPI.     Surgical History  Problems    · History of Femur fracture repair   · History of Tonsillectomy     Family History  Mother    · Family history of   Father    · Family history of      Social History   · Never a smoker  No alcohol  No illicit drugs  Lives in long term care facility              Past Medical History:   Diagnosis Date    Other malaise 2020    Physical deconditioning    Personal history of other diseases of the digestive system     History of constipation    Personal history of other diseases of the nervous system and sense organs 2020    History of polyneuropathy    Personal history of urinary (tract) infections     History of urinary tract infection    Unspecified dementia, unspecified severity, without behavioral disturbance, psychotic disturbance, mood disturbance, and anxiety (CMS/[facility]) 12/10/2022    Dementia    Unspecified diastolic (congestive) heart failure (CMS/[facility])     Diastolic heart failure    Unspecified fracture of right femur, initial encounter for closed fracture (CMS/[facility])     Femur fracture, right             Lab Results   Component Value Date    WBC 9.1 2023    HGB 12.7 2023    HCT 41.6 2023     2023    ALT 8 2023    AST 14 2023     2023    K 4.1 2023     2023    CREATININE 0.81 2023    BUN 30 (H) 2023    CO2 21 2023    TSH 2.92 10/14/2020    INR 1.0 2022    HGBA1C 5.9 10/15/2020     Outside Labs  CBC: Date: 10/12/22, WBC: 4.8, Hgb: 11.9, Hct: 35.2, PLT: 290   CBC: Date: 22, WBC: 3.9, Hgb: 12.7, Hct: 38.5, PLT: 307   BMP: Date: 10/12/22, Na:  "140, K: 4.3, Cl: 107, CO2: 25, BUN: 25, Cr: 0.6, Glu: 80, Ca: 9   BMP: Date: 7/7/22, Na: 141, K: 4.6, Cl: 105, CO2: 26, BUN: 30, Cr: 0.7, Glu: 113, Ca: 9.1   Hepatic Function Tests: Date: 3/8/22, AST: 15, ALT: 11, Alk Phos: 114, T Bili: 0.4, Albumin: 3.6   Hepatic Function Tests: Date: 3/9/21, AST: 15, ALT: 14, Alk Phos: 101, T Bili: 0.6, Albumin: 3.3   Lipid panel on 10/13/20 as follows: Cholesterol 186; triglyceride 84; HDL 51; ; VLDL 17.    Vitamin D 25 hydroxy level on 10/13/20 22 L    BMP: Date: 1/10/2023 Na 142 K 4.4 Cr 0.7 BUN 27 glucose 77 Ca 9.2 GFR 79  CBC: Date: 1/10/2023 WBC 6.9 Hgb 12.8 hematocrit 38.4 platelets 398  Liver function: Date: 1/10/2023 ALT 11 AST 18 alkaline phos.  112 bilirubin 0.5 albumin 3.7 protein 6.4    BMP: Date: 4/18/2023 Na 145 K 4 Cr 0.7 BUN 25 glucose 85 Ca 8.8 GFR 79  CBC: Date: 4/18/2023 WBC 5.2 Hgb 12.6 hematocrit 38.3 platelets 298  Liver function: Date: 4/18/2023 ALT 6 AST 11 alkaline phos.  94 bilirubin 0.5 albumin 3.4 protein 5.7   4/18/23 vitamin D 25 hydroxy level 19.62 L    BMP: Date: 8/8/2023 Na 142 K 4.9 Cr 0.6 BUN 24 glucose 65 Ca 9.2 GFR 94  CBC: Date: 8/8/2023 WBC 5.4 Hgb 12.8 hematocrit 40.4 platelets 398  Liver function: Date: 8/8/2023 ALT 8 AST 18 alkaline phos.  108 bilirubin 0.6 albumin 3.7 protein 6.4    8/8/23 vitamin D hydroxy level 29.92 L          /75   Pulse 82   Temp 36.1 °C (97 °F)   Resp 18   Ht 1.6 m (5' 3\")   Wt 48.2 kg (106 lb 3.2 oz)   SpO2 95%   BMI 18.81 kg/m²      Physical Exam  Vitals and nursing note reviewed.   Constitutional:       Appearance: Normal appearance.   HENT:      Head: Normocephalic.      Right Ear: External ear normal.      Left Ear: External ear normal.      Nose: Nose normal.      Mouth/Throat:      Mouth: Mucous membranes are moist.      Pharynx: Oropharynx is clear.   Eyes:      Extraocular Movements: Extraocular movements intact.      Conjunctiva/sclera: Conjunctivae normal.      Pupils: Pupils are " equal, round, and reactive to light.   Cardiovascular:      Rate and Rhythm: Normal rate and regular rhythm.      Pulses: Normal pulses.      Heart sounds: Normal heart sounds.   Pulmonary:      Effort: Pulmonary effort is normal.      Breath sounds: Normal breath sounds.   Abdominal:      General: Bowel sounds are normal.      Palpations: Abdomen is soft.   Musculoskeletal:         General: Normal range of motion.      Cervical back: Normal range of motion and neck supple.      Comments: Generalized muscle weakness; impaired gait and mobility   Skin:     General: Skin is warm and dry.   Neurological:      Mental Status: She is alert. Mental status is at baseline. She is disoriented.      Motor: Weakness present.      Coordination: Coordination abnormal.      Gait: Gait abnormal.   Psychiatric:         Mood and Affect: Affect is inappropriate.         Speech: Speech is tangential.         Cognition and Memory: Cognition is impaired. Memory is impaired.      Comments: Dementia   A x O x 1          Assessment/Plan    Ordered BMP, CBC with diff., Lipid profile, Magnesium level, TSH, Vitamin D hydroxy level for tomorrow.     Dementia; Alzheimer's disease :   Continue Aricept.   Follow up with in-house psych NP at [facility]  Monitor for progression of dementia.  Monitor for agitation and/or behaviors.       Weight loss:      Monitor weights.      Weights have been trending down.       Continue house supplements shakes TID   In house dietician to follow patient.    Generalized muscle weakness;  Immobility; :    In wheelchair.   Spends most of the day sitting in a wheelchair.   Able to propel her wheelchair independently.  Fall prevention strategies.     Sequela of  CVA/left sided weakness:  Patient had a stroke in October, 2020.  Continue ASA and Plavix.  Requires full supportive care.            Problem List Items Addressed This Visit          Neuro    Dementia (CMS/[facility]) - Primary     Other Visit Diagnoses       Weight loss         Generalized muscle weakness        Immobility            Sandi Rudolph, APRN-CNP

## 2023-11-09 ENCOUNTER — NURSING HOME VISIT (OUTPATIENT)
Dept: POST ACUTE CARE | Facility: EXTERNAL LOCATION | Age: 88
End: 2023-11-09
Payer: COMMERCIAL

## 2023-11-09 DIAGNOSIS — R53.1 LEFT-SIDED WEAKNESS: ICD-10-CM

## 2023-11-09 DIAGNOSIS — F02.C18 SEVERE ALZHEIMER'S DEMENTIA WITH OTHER BEHAVIORAL DISTURBANCE, UNSPECIFIED TIMING OF DEMENTIA ONSET (MULTI): ICD-10-CM

## 2023-11-09 DIAGNOSIS — Z74.09 IMMOBILITY: ICD-10-CM

## 2023-11-09 DIAGNOSIS — R63.4 WEIGHT LOSS: ICD-10-CM

## 2023-11-09 DIAGNOSIS — G30.9 ALZHEIMERS DISEASE (MULTI): ICD-10-CM

## 2023-11-09 DIAGNOSIS — F02.80 ALZHEIMERS DISEASE (MULTI): ICD-10-CM

## 2023-11-09 DIAGNOSIS — G30.9 SEVERE ALZHEIMER'S DEMENTIA WITH OTHER BEHAVIORAL DISTURBANCE, UNSPECIFIED TIMING OF DEMENTIA ONSET (MULTI): ICD-10-CM

## 2023-11-09 DIAGNOSIS — I69.30 SEQUELA OF CEREBROVASCULAR ACCIDENT: ICD-10-CM

## 2023-11-09 DIAGNOSIS — I50.9 CHRONIC CONGESTIVE HEART FAILURE, UNSPECIFIED HEART FAILURE TYPE (MULTI): ICD-10-CM

## 2023-11-09 DIAGNOSIS — I10 BENIGN ESSENTIAL HTN: Primary | ICD-10-CM

## 2023-11-09 DIAGNOSIS — E55.9 VITAMIN D DEFICIENCY: ICD-10-CM

## 2023-11-09 DIAGNOSIS — M62.81 GENERALIZED MUSCLE WEAKNESS: ICD-10-CM

## 2023-11-09 PROCEDURE — 99309 SBSQ NF CARE MODERATE MDM 30: CPT | Performed by: NURSE PRACTITIONER

## 2023-11-09 NOTE — LETTER
Patient: Shonda Solano  : 3/14/1934    Encounter Date: 2023    Name: Shonda Solano YOB: 1934    Code Status:  DNR-cc    Chief Complaint:  Follow up on HTN:    HPI   89 year old female patient at [facility] in Long Term Care.       Medical history includes: Difficulty in walking; muscle weakness (generalized); Acquired hammer toes on the right & left foot; Personal history of other infectious and parasitic diseases; disorientation; Alzheimer's disease; unspecified; symbolic dysfunctions; other speech and language deficits following unspecified cerebrovascular disease; mood disorder due to known physiological condition, unspecified; Bipolar disorder; Displaced intertrochanteric fracture of right femur; need for assistance with personal care; chronic kidney disease; primary osteoarthritis; unspecified ankle and foot; polyneuropathy, unspecified; unspecified abnormalities of gait and mobility; other osteoporosis without current pathological fracture.      Chronic comorbidities as follows:     Benign essential HTN; Chronic diastolic heart disease:   Recent /59  Not on any medications for HTN.   No increased edema or SOB reported.   No complaints of chest pain, SOB, headaches or dizziness.  Patient seen today, she is sitting up in her wheelchair.   Calm, cooperative, pleasantly confused.   A x O x 1.   No complaints of headaches or dizziness.          Dementia; Alzheimer's disease :   On donepezil   Also follows up with in-house psych NP at .  No agitation reported.   Follows simple commands.  No complaints of chest pain.  No headaches or dizziness.  Mentation at baseline.     Vitamin D deficiency:  On vitamin D 3 5000 units PO every day.  Recent vitamin D 25 hydroxy levels:  10/6/2023: Vitamin D 25-hydroxy level 32.58 within normal limits (range )   2023: Vitamin D 25-hydroxy level 33.84    Sequela of  CVA/left sided weakness:  Patient had a stroke in October,  .  On ASA and Plavix.  Requires full supportive care.        Weight loss:      Weights are trending down.        On house supplements shakes TID   Recent weights:  23 105.2#  10/4/23 106.6 #  23 106.4 #  7/3/23 128.4 #  23 138.6 #  23 136.2 #          Generalized muscle weakness;  Immobility; :    In wheelchair. Spends most of the day sitting in a wheelchair.   Able to propel her wheelchair independently.           Reviewed  EMR  Reviewed medical, social, surgical and family history.  Reviewed all current medications and performed medication reconciliation.  Performed prescription drug management.  Reviewed vital signs AND lab results  Reviewed [facility] Documentation  Discussed patient with nursing.       ROS: Limited ROS due to mentation; ROS negative unless noted in HPI.     Surgical History  Problems    · History of Femur fracture repair   · History of Tonsillectomy     Family History  Mother    · Family history of   Father    · Family history of      Social History   · Never a smoker  No alcohol  No illicit drugs  Lives in long term care facility              Past Medical History:   Diagnosis Date   • Other malaise 2020    Physical deconditioning   • Personal history of other diseases of the digestive system     History of constipation   • Personal history of other diseases of the nervous system and sense organs 2020    History of polyneuropathy   • Personal history of urinary (tract) infections     History of urinary tract infection   • Unspecified dementia, unspecified severity, without behavioral disturbance, psychotic disturbance, mood disturbance, and anxiety (CMS/HCC) 12/10/2022    Dementia   • Unspecified diastolic (congestive) heart failure (CMS/HCC)     Diastolic heart failure   • Unspecified fracture of right femur, initial encounter for closed fracture (CMS/HCC)     Femur fracture, right             Lab Results   Component Value Date    WBC  9.1 08/12/2023    HGB 12.7 08/12/2023    HCT 41.6 08/12/2023     08/12/2023    ALT 8 08/12/2023    AST 14 08/12/2023     08/12/2023    K 4.1 08/12/2023     08/12/2023    CREATININE 0.81 08/12/2023    BUN 30 (H) 08/12/2023    CO2 21 08/12/2023    TSH 2.92 10/14/2020    INR 1.0 02/20/2022    HGBA1C 5.9 10/15/2020     Outside Labs  CBC: Date: 10/12/22, WBC: 4.8, Hgb: 11.9, Hct: 35.2, PLT: 290   CBC: Date: 7/7/22, WBC: 3.9, Hgb: 12.7, Hct: 38.5, PLT: 307   BMP: Date: 10/12/22, Na: 140, K: 4.3, Cl: 107, CO2: 25, BUN: 25, Cr: 0.6, Glu: 80, Ca: 9   BMP: Date: 7/7/22, Na: 141, K: 4.6, Cl: 105, CO2: 26, BUN: 30, Cr: 0.7, Glu: 113, Ca: 9.1   Hepatic Function Tests: Date: 3/8/22, AST: 15, ALT: 11, Alk Phos: 114, T Bili: 0.4, Albumin: 3.6   Hepatic Function Tests: Date: 3/9/21, AST: 15, ALT: 14, Alk Phos: 101, T Bili: 0.6, Albumin: 3.3   Lipid panel on 10/13/20 as follows: Cholesterol 186; triglyceride 84; HDL 51; ; VLDL 17.    Vitamin D 25 hydroxy level on 10/13/20 22 L    BMP: Date: 1/10/2023 Na 142 K 4.4 Cr 0.7 BUN 27 glucose 77 Ca 9.2 GFR 79  CBC: Date: 1/10/2023 WBC 6.9 Hgb 12.8 hematocrit 38.4 platelets 398  Liver function: Date: 1/10/2023 ALT 11 AST 18 alkaline phos.  112 bilirubin 0.5 albumin 3.7 protein 6.4    BMP: Date: 4/18/2023 Na 145 K 4 Cr 0.7 BUN 25 glucose 85 Ca 8.8 GFR 79  CBC: Date: 4/18/2023 WBC 5.2 Hgb 12.6 hematocrit 38.3 platelets 298  Liver function: Date: 4/18/2023 ALT 6 AST 11 alkaline phos.  94 bilirubin 0.5 albumin 3.4 protein 5.7   4/18/23 vitamin D 25 hydroxy level 19.62 L    BMP: Date: 8/8/2023 Na 142 K 4.9 Cr 0.6 BUN 24 glucose 65 Ca 9.2 GFR 94  CBC: Date: 8/8/2023 WBC 5.4 Hgb 12.8 hematocrit 40.4 platelets 398  Liver function: Date: 8/8/2023 ALT 8 AST 18 alkaline phos.  108 bilirubin 0.6 albumin 3.7 protein 6.4    8/8/23 vitamin D hydroxy level 29.92 L    RECENT LABS:  BMP: Date: 10/6/2023 Na 142 K 4.5 Cr 0.7 BUN 28 glucose 86 Ca 9.8 GFR 79  CBC: Date: 10/6/2023 WBC 6.2  "Hgb 12.7 hematocrit 39.3 platelets 358    10/6/2023: TSH 2.652 within normal limits (range 0.340-5.500)    10/6/2023: Vitamin D 25-hydroxy level 32.58 within normal limits (range )    11/2/2023: Vitamin D 25-hydroxy level 33.84        /59   Pulse 91   Temp 36.4 °C (97.5 °F)   Resp 18   Ht 1.6 m (5' 3\")   Wt 47.7 kg (105 lb 3.2 oz)   SpO2 97%   BMI 18.64 kg/m²      Physical Exam  Vitals and nursing note reviewed.   Constitutional:       Appearance: Normal appearance.   HENT:      Head: Normocephalic.      Right Ear: External ear normal.      Left Ear: External ear normal.      Nose: Nose normal.      Mouth/Throat:      Mouth: Mucous membranes are moist.      Pharynx: Oropharynx is clear.   Eyes:      Extraocular Movements: Extraocular movements intact.      Conjunctiva/sclera: Conjunctivae normal.      Pupils: Pupils are equal, round, and reactive to light.   Cardiovascular:      Rate and Rhythm: Normal rate and regular rhythm.      Pulses: Normal pulses.      Heart sounds: Normal heart sounds.   Pulmonary:      Effort: Pulmonary effort is normal.      Breath sounds: Normal breath sounds.   Abdominal:      General: Bowel sounds are normal.      Palpations: Abdomen is soft.   Musculoskeletal:         General: Normal range of motion.      Cervical back: Normal range of motion and neck supple.      Comments: Generalized muscle weakness; impaired gait and mobility   Skin:     General: Skin is warm and dry.   Neurological:      Mental Status: She is alert. Mental status is at baseline. She is disoriented.      Motor: Weakness present.      Coordination: Coordination abnormal.      Gait: Gait abnormal.   Psychiatric:         Mood and Affect: Affect is inappropriate.         Speech: Speech is tangential.         Cognition and Memory: Cognition is impaired. Memory is impaired.      Comments: Dementia   A x O x 1          Assessment/Plan   Ordered BMP, CBC with diff., Vitamin D hydroxy level for the 1st " Monday in December.    Benign essential HTN; Chronic diastolic heart disease; :   Monitor Bps.          Monitor weights.          Monitor for SOB and edema.          Monitor for chest pain.           Follow up with in-house cardiology PRN.        Dementia; Alzheimer's disease :   Continue donepezil.  Follow up with in-house psych NP at [facility].  Monitor for agitation.    Vitamin D deficiency:  Continue vitamin D 3 5000 units PO every day.  Recheck vitamin D 25 hydroxy level in December.    Sequela of  CVA/left sided weakness:  Patient had a stroke in October, 2020.  Continue ASA and Plavix.  Requires full supportive care.        Weight loss:       In house dietician to follow up with patient on weights.       Continue house supplements shakes TID        Monitor weights.          Generalized muscle weakness;  Immobility :    In wheelchair.  Able to propel her wheelchair independently.  Fall prevention strategies.      Problem List Items Addressed This Visit          Neuro    Alzheimers disease (CMS/HCC)    Dementia (CMS/HCC)     Other Visit Diagnoses       Benign essential HTN    -  Primary    Chronic congestive heart failure, unspecified heart failure type (CMS/HCC)        Vitamin D deficiency        Sequela of cerebrovascular accident        Left-sided weakness        Weight loss        Generalized muscle weakness        Immobility            YIMI Jordan       Electronically Signed By: YIMI Jordan   11/13/23 12:40 AM

## 2023-11-13 VITALS
HEART RATE: 91 BPM | BODY MASS INDEX: 18.64 KG/M2 | DIASTOLIC BLOOD PRESSURE: 59 MMHG | HEIGHT: 63 IN | OXYGEN SATURATION: 97 % | RESPIRATION RATE: 18 BRPM | TEMPERATURE: 97.5 F | WEIGHT: 105.2 LBS | SYSTOLIC BLOOD PRESSURE: 109 MMHG

## 2023-11-13 NOTE — PROGRESS NOTES
Name: Shonda Solano    : 3/14/1934    Code Status:  DNR-cc    Chief Complaint:  Follow up on HTN:    HPI   89 year old female patient at [facility] in [facility] Term [facility].       Medical history includes: Difficulty in walking; muscle weakness (generalized); Acquired hammer toes on the right & left foot; Personal history of other infectious and parasitic diseases; disorientation; Alzheimer's disease; unspecified; symbolic dysfunctions; other speech and language deficits following unspecified cerebrovascular disease; mood disorder due to known physiological condition, unspecified; Bipolar disorder; Displaced intertrochanteric fracture of right femur; need for assistance with personal care; chronic kidney disease; primary osteoarthritis; unspecified ankle and foot; polyneuropathy, unspecified; unspecified abnormalities of gait and mobility; other osteoporosis without current pathological fracture.      Chronic comorbidities as follows:     Benign essential HTN; Chronic diastolic heart disease:   Recent /59  Not on any medications for HTN.   No increased edema or SOB reported.   No complaints of chest pain, SOB, headaches or dizziness.  Patient seen today, she is sitting up in her wheelchair.   Calm, cooperative, pleasantly confused.   A x O x 1.   No complaints of headaches or dizziness.          Dementia; Alzheimer's disease :   On donepezil   Also follows up with in-house psych NP at .  No agitation reported.   Follows simple commands.  No complaints of chest pain.  No headaches or dizziness.  Mentation at baseline.     Vitamin D deficiency:  On vitamin D 3 5000 units PO every day.  Recent vitamin D 25 hydroxy levels:  10/6/2023: Vitamin D 25-hydroxy level 32.58 within normal limits (range )   2023: Vitamin D 25-hydroxy level 33.84    Sequela of  CVA/left sided weakness:  Patient had a stroke in .  On ASA and Plavix.  Requires full supportive care.        Weight  loss:      Weights are trending down.        On house supplements shakes TID   Recent weights:  23 105.2#  10/4/23 106.6 #  23 106.4 #  7/3/23 128.4 #  23 138.6 #  23 136.2 #          Generalized muscle weakness;  Immobility; :    In wheelchair. Spends most of the day sitting in a wheelchair.   Able to propel her wheelchair independently.           Reviewed  EMR  Reviewed medical, social, surgical and family history.  Reviewed all current medications and performed medication reconciliation.  Performed prescription drug management.  Reviewed vital signs AND lab results  Reviewed [facility] Documentation  Discussed patient with nursing.       ROS: Limited ROS due to mentation; ROS negative unless noted in HPI.     Surgical History  Problems    · History of Femur fracture repair   · History of Tonsillectomy     Family History  Mother    · Family history of   Father    · Family history of      Social History   · Never a smoker  No alcohol  No illicit drugs  Lives in long term care facility              Past Medical History:   Diagnosis Date    Other malaise 2020    Physical deconditioning    Personal history of other diseases of the digestive system     History of constipation    Personal history of other diseases of the nervous system and sense organs 2020    History of polyneuropathy    Personal history of urinary (tract) infections     History of urinary tract infection    Unspecified dementia, unspecified severity, without behavioral disturbance, psychotic disturbance, mood disturbance, and anxiety (CMS/HCC) 12/10/2022    Dementia    Unspecified diastolic (congestive) heart failure (CMS/HCC)     Diastolic heart failure    Unspecified fracture of right femur, initial encounter for closed fracture (CMS/HCC)     Femur fracture, right             Lab Results   Component Value Date    WBC 9.1 2023    HGB 12.7 2023    HCT 41.6 2023      08/12/2023    ALT 8 08/12/2023    AST 14 08/12/2023     08/12/2023    K 4.1 08/12/2023     08/12/2023    CREATININE 0.81 08/12/2023    BUN 30 (H) 08/12/2023    CO2 21 08/12/2023    TSH 2.92 10/14/2020    INR 1.0 02/20/2022    HGBA1C 5.9 10/15/2020     Outside Labs  CBC: Date: 10/12/22, WBC: 4.8, Hgb: 11.9, Hct: 35.2, PLT: 290   CBC: Date: 7/7/22, WBC: 3.9, Hgb: 12.7, Hct: 38.5, PLT: 307   BMP: Date: 10/12/22, Na: 140, K: 4.3, Cl: 107, CO2: 25, BUN: 25, Cr: 0.6, Glu: 80, Ca: 9   BMP: Date: 7/7/22, Na: 141, K: 4.6, Cl: 105, CO2: 26, BUN: 30, Cr: 0.7, Glu: 113, Ca: 9.1   Hepatic Function Tests: Date: 3/8/22, AST: 15, ALT: 11, Alk Phos: 114, T Bili: 0.4, Albumin: 3.6   Hepatic Function Tests: Date: 3/9/21, AST: 15, ALT: 14, Alk Phos: 101, T Bili: 0.6, Albumin: 3.3   Lipid panel on 10/13/20 as follows: Cholesterol 186; triglyceride 84; HDL 51; ; VLDL 17.    Vitamin D 25 hydroxy level on 10/13/20 22 L    BMP: Date: 1/10/2023 Na 142 K 4.4 Cr 0.7 BUN 27 glucose 77 Ca 9.2 GFR 79  CBC: Date: 1/10/2023 WBC 6.9 Hgb 12.8 hematocrit 38.4 platelets 398  Liver function: Date: 1/10/2023 ALT 11 AST 18 alkaline phos.  112 bilirubin 0.5 albumin 3.7 protein 6.4    BMP: Date: 4/18/2023 Na 145 K 4 Cr 0.7 BUN 25 glucose 85 Ca 8.8 GFR 79  CBC: Date: 4/18/2023 WBC 5.2 Hgb 12.6 hematocrit 38.3 platelets 298  Liver function: Date: 4/18/2023 ALT 6 AST 11 alkaline phos.  94 bilirubin 0.5 albumin 3.4 protein 5.7   4/18/23 vitamin D 25 hydroxy level 19.62 L    BMP: Date: 8/8/2023 Na 142 K 4.9 Cr 0.6 BUN 24 glucose 65 Ca 9.2 GFR 94  CBC: Date: 8/8/2023 WBC 5.4 Hgb 12.8 hematocrit 40.4 platelets 398  Liver function: Date: 8/8/2023 ALT 8 AST 18 alkaline phos.  108 bilirubin 0.6 albumin 3.7 protein 6.4    8/8/23 vitamin D hydroxy level 29.92 L    RECENT LABS:  BMP: Date: 10/6/2023 Na 142 K 4.5 Cr 0.7 BUN 28 glucose 86 Ca 9.8 GFR 79  CBC: Date: 10/6/2023 WBC 6.2 Hgb 12.7 hematocrit 39.3 platelets 358    10/6/2023: TSH 2.652 within  "normal limits (range 0.340-5.500)    10/6/2023: Vitamin D 25-hydroxy level 32.58 within normal limits (range )    11/2/2023: Vitamin D 25-hydroxy level 33.84        /59   Pulse 91   Temp 36.4 °C (97.5 °F)   Resp 18   Ht 1.6 m (5' 3\")   Wt 47.7 kg (105 lb 3.2 oz)   SpO2 97%   BMI 18.64 kg/m²      Physical Exam  Vitals and nursing note reviewed.   Constitutional:       Appearance: Normal appearance.   HENT:      Head: Normocephalic.      Right Ear: External ear normal.      Left Ear: External ear normal.      Nose: Nose normal.      Mouth/Throat:      Mouth: Mucous membranes are moist.      Pharynx: Oropharynx is clear.   Eyes:      Extraocular Movements: Extraocular movements intact.      Conjunctiva/sclera: Conjunctivae normal.      Pupils: Pupils are equal, round, and reactive to light.   Cardiovascular:      Rate and Rhythm: Normal rate and regular rhythm.      Pulses: Normal pulses.      Heart sounds: Normal heart sounds.   Pulmonary:      Effort: Pulmonary effort is normal.      Breath sounds: Normal breath sounds.   Abdominal:      General: Bowel sounds are normal.      Palpations: Abdomen is soft.   Musculoskeletal:         General: Normal range of motion.      Cervical back: Normal range of motion and neck supple.      Comments: Generalized muscle weakness; impaired gait and mobility   Skin:     General: Skin is warm and dry.   Neurological:      Mental Status: She is alert. Mental status is at baseline. She is disoriented.      Motor: Weakness present.      Coordination: Coordination abnormal.      Gait: Gait abnormal.   Psychiatric:         Mood and Affect: Affect is inappropriate.         Speech: Speech is tangential.         Cognition and Memory: Cognition is impaired. Memory is impaired.      Comments: Dementia   A x O x 1          Assessment/Plan    Ordered BMP, CBC with diff., Vitamin D hydroxy level for the 1st Monday in December.    Benign essential HTN; Chronic diastolic heart " disease; :   Monitor Bps.          Monitor weights.          Monitor for SOB and edema.          Monitor for chest pain.           Follow up with in-house cardiology PRN.        Dementia; Alzheimer's disease :   Continue donepezil.  Follow up with in-house psych NP at [facility].  Monitor for agitation.    Vitamin D deficiency:  Continue vitamin D 3 5000 units PO every day.  Recheck vitamin D 25 hydroxy level in December.    Sequela of  CVA/left sided weakness:  Patient had a stroke in October, 2020.  Continue ASA and Plavix.  Requires full supportive care.        Weight loss:       In house dietician to follow up with patient on weights.       Continue house supplements shakes TID        Monitor weights.          Generalized muscle weakness;  Immobility :    In wheelchair.  Able to propel her wheelchair independently.  Fall prevention strategies.      Problem List Items Addressed This Visit          Neuro    Alzheimers disease (CMS/HCC)    Dementia (CMS/HCC)     Other Visit Diagnoses       Benign essential HTN    -  Primary    Chronic congestive heart failure, unspecified heart failure type (CMS/HCC)        Vitamin D deficiency        Sequela of cerebrovascular accident        Left-sided weakness        Weight loss        Generalized muscle weakness        Immobility            Sandi Rudolph, APRN-CNP

## 2023-12-06 ENCOUNTER — NURSING HOME VISIT (OUTPATIENT)
Dept: POST ACUTE CARE | Facility: EXTERNAL LOCATION | Age: 88
End: 2023-12-06
Payer: COMMERCIAL

## 2023-12-06 DIAGNOSIS — F31.9 BIPOLAR DEPRESSION (MULTI): ICD-10-CM

## 2023-12-06 DIAGNOSIS — I10 BENIGN ESSENTIAL HTN: Primary | ICD-10-CM

## 2023-12-06 DIAGNOSIS — G30.9 SEVERE ALZHEIMER'S DEMENTIA WITH OTHER BEHAVIORAL DISTURBANCE, UNSPECIFIED TIMING OF DEMENTIA ONSET (MULTI): ICD-10-CM

## 2023-12-06 DIAGNOSIS — F02.C18 SEVERE ALZHEIMER'S DEMENTIA WITH OTHER BEHAVIORAL DISTURBANCE, UNSPECIFIED TIMING OF DEMENTIA ONSET (MULTI): ICD-10-CM

## 2023-12-06 DIAGNOSIS — Z86.73 HISTORY OF CVA (CEREBROVASCULAR ACCIDENT): ICD-10-CM

## 2023-12-06 PROCEDURE — 99308 SBSQ NF CARE LOW MDM 20: CPT | Performed by: INTERNAL MEDICINE

## 2023-12-06 NOTE — LETTER
Patient: Shonda Solano  : 3/14/1934    Encounter Date: 2023    Ms Solano is a 89-year-old woman with history of Alzheimer's dementia,CVA().   She is a long-term resident at [facility].   No recent falls. Sitting in dining [place]cor[place]. No concerns by staff.   ROS: no changes in weight   Chest- no pain orpalpitations   Lungs- no cough or SOB   Abd- no pain, no N/V/D   Ext- no swelling   Psych- memory impaired   Vital signs: /74   Temperature 98.2   Pulse 80   Weight 103 pounds   Gen- NAD, pleasantly confused   Neck- supple, no jvd   Lungs: Lungs clear toauscultation   Cardio: S1-S2 normal   ABD: Soft nontender bowel sounds present   Musc/Skel: No deformities or edema   Extremities: No edema   Neuro- alert, no gross deformity   Psych: Memory impaired     Assessment and Plan:   CVA-stable   Continue with aspirin, Plavix   continue with fall and safety precautions   Alzheimer's dementia-stable   Continue with Aricept   Depression- followed by Psych   Continue with supportive care      Electronically Signed By: Indiana Wen MD   23  1:36 PM

## 2023-12-12 ENCOUNTER — NURSING HOME VISIT (OUTPATIENT)
Dept: POST ACUTE CARE | Facility: EXTERNAL LOCATION | Age: 88
End: 2023-12-12
Payer: COMMERCIAL

## 2023-12-12 DIAGNOSIS — R53.1 LEFT-SIDED WEAKNESS: ICD-10-CM

## 2023-12-12 DIAGNOSIS — E55.9 VITAMIN D DEFICIENCY: ICD-10-CM

## 2023-12-12 DIAGNOSIS — F02.80 ALZHEIMER DISEASE (MULTI): ICD-10-CM

## 2023-12-12 DIAGNOSIS — Z74.09 IMMOBILITY: ICD-10-CM

## 2023-12-12 DIAGNOSIS — F02.C18 SEVERE ALZHEIMER'S DEMENTIA WITH OTHER BEHAVIORAL DISTURBANCE, UNSPECIFIED TIMING OF DEMENTIA ONSET (MULTI): Primary | ICD-10-CM

## 2023-12-12 DIAGNOSIS — R63.4 WEIGHT LOSS: ICD-10-CM

## 2023-12-12 DIAGNOSIS — G30.9 SEVERE ALZHEIMER'S DEMENTIA WITH OTHER BEHAVIORAL DISTURBANCE, UNSPECIFIED TIMING OF DEMENTIA ONSET (MULTI): Primary | ICD-10-CM

## 2023-12-12 DIAGNOSIS — G30.9 ALZHEIMER DISEASE (MULTI): ICD-10-CM

## 2023-12-12 DIAGNOSIS — M62.81 GENERALIZED MUSCLE WEAKNESS: ICD-10-CM

## 2023-12-12 DIAGNOSIS — I69.30 SEQUELA OF CEREBROVASCULAR ACCIDENT: ICD-10-CM

## 2023-12-12 PROCEDURE — 99308 SBSQ NF CARE LOW MDM 20: CPT | Performed by: NURSE PRACTITIONER

## 2023-12-12 NOTE — LETTER
Patient: Shonda Solano  : 3/14/1934    Encounter Date: 2023    Name: Shonda Solano YOB: 1934    Code Status:  DNR-cc    Chief Complaint:  Follow up on Dementia & weight loss:    HPI   89 year old female patient at [facility] in Long Term Care.       Medical history includes: Difficulty in walking; muscle weakness (generalized); Acquired hammer toes on the right & left foot; Personal history of other infectious and parasitic diseases; disorientation; Alzheimer's disease; unspecified; symbolic dysfunctions; other speech and language deficits following unspecified cerebrovascular disease; mood disorder due to known physiological condition, unspecified; Bipolar disorder; Displaced intertrochanteric fracture of right femur; need for assistance with personal care; chronic kidney disease; primary osteoarthritis; unspecified ankle and foot; polyneuropathy, unspecified; unspecified abnormalities of gait and mobility; other osteoporosis without current pathological fracture.      Chronic comorbidities as follows:     Dementia; Alzheimer's disease; Weight loss:   On donepezil   Also follows up with in-house psych NP at .  No agitation reported.   Weights are trending down.  Recent weights as follows:  7/3/23 128.4 #  23 110.8 #  23 107.4 #  10/6/23 106.2 #  23 105.2 #  23 103 #   On house supplements shakes TID   Follows simple commands.  No complaints of chest pain.  No headaches or dizziness.  Mentation at baseline.   Patient seen today, she is sitting up in her wheelchair.   Calm, cooperative, pleasantly confused.   A x O x 1.   No complaints of headaches or dizziness.        Vitamin D deficiency:  On vitamin D 3 5000 units PO every day.  Recent vitamin D 25 hydroxy levels:  10/6/2023: Vitamin D 25-hydroxy level 32.58 within normal limits (range )   2023: Vitamin D 25-hydroxy level 33.84  2023 vitamin D 25-hydroxy level 56 wnl    Sequela of   CVA/left sided weakness:  Patient had a stroke in .  On ASA and Plavix.  Requires full supportive care.         Immobility; Generalized muscle weakness :    In wheelchair. Spends most of the day sitting in a wheelchair.   Able to propel her wheelchair independently.                   Reviewed  EMR  Reviewed medical, social, surgical and family history.  Reviewed all current medications and performed medication reconciliation.  Performed prescription drug management.  Reviewed vital signs AND lab results  Reviewed [facility] Documentation  Discussed patient with nursing.       ROS: Limited ROS due to mentation; ROS negative unless noted in HPI.     Surgical History  Problems    · History of Femur fracture repair   · History of Tonsillectomy     Family History  Mother    · Family history of   Father    · Family history of      Social History   · Never a smoker  No alcohol  No illicit drugs  Lives in long term care facility              Past Medical History:   Diagnosis Date   • Other malaise 2020    Physical deconditioning   • Personal history of other diseases of the digestive system     History of constipation   • Personal history of other diseases of the nervous system and sense organs 2020    History of polyneuropathy   • Personal history of urinary (tract) infections     History of urinary tract infection   • Unspecified dementia, unspecified severity, without behavioral disturbance, psychotic disturbance, mood disturbance, and anxiety (CMS/HCC) 12/10/2022    Dementia   • Unspecified diastolic (congestive) heart failure (CMS/[facility])     Diastolic heart failure   • Unspecified fracture of right femur, initial encounter for closed fracture (CMS/[facility])     Femur fracture, right             Lab Results   Component Value Date    WBC 9.1 2023    HGB 12.7 2023    HCT 41.6 2023     2023    ALT 8 2023    AST 14 2023      08/12/2023    K 4.1 08/12/2023     08/12/2023    CREATININE 0.81 08/12/2023    BUN 30 (H) 08/12/2023    CO2 21 08/12/2023    TSH 2.92 10/14/2020    INR 1.0 02/20/2022    HGBA1C 5.9 10/15/2020     Outside Labs  CBC: Date: 10/12/22, WBC: 4.8, Hgb: 11.9, Hct: 35.2, PLT: 290   CBC: Date: 7/7/22, WBC: 3.9, Hgb: 12.7, Hct: 38.5, PLT: 307   BMP: Date: 10/12/22, Na: 140, K: 4.3, Cl: 107, CO2: 25, BUN: 25, Cr: 0.6, Glu: 80, Ca: 9   BMP: Date: 7/7/22, Na: 141, K: 4.6, Cl: 105, CO2: 26, BUN: 30, Cr: 0.7, Glu: 113, Ca: 9.1   Hepatic Function Tests: Date: 3/8/22, AST: 15, ALT: 11, Alk Phos: 114, T Bili: 0.4, Albumin: 3.6   Hepatic Function Tests: Date: 3/9/21, AST: 15, ALT: 14, Alk Phos: 101, T Bili: 0.6, Albumin: 3.3   Lipid panel on 10/13/20 as follows: Cholesterol 186; triglyceride 84; HDL 51; ; VLDL 17.    Vitamin D 25 hydroxy level on 10/13/20 22 L    BMP: Date: 1/10/2023 Na 142 K 4.4 Cr 0.7 BUN 27 glucose 77 Ca 9.2 GFR 79  CBC: Date: 1/10/2023 WBC 6.9 Hgb 12.8 hematocrit 38.4 platelets 398  Liver function: Date: 1/10/2023 ALT 11 AST 18 alkaline phos.  112 bilirubin 0.5 albumin 3.7 protein 6.4    BMP: Date: 4/18/2023 Na 145 K 4 Cr 0.7 BUN 25 glucose 85 Ca 8.8 GFR 79  CBC: Date: 4/18/2023 WBC 5.2 Hgb 12.6 hematocrit 38.3 platelets 298  Liver function: Date: 4/18/2023 ALT 6 AST 11 alkaline phos.  94 bilirubin 0.5 albumin 3.4 protein 5.7   4/18/23 vitamin D 25 hydroxy level 19.62 L    BMP: Date: 8/8/2023 Na 142 K 4.9 Cr 0.6 BUN 24 glucose 65 Ca 9.2 GFR 94  CBC: Date: 8/8/2023 WBC 5.4 Hgb 12.8 hematocrit 40.4 platelets 398  Liver function: Date: 8/8/2023 ALT 8 AST 18 alkaline phos.  108 bilirubin 0.6 albumin 3.7 protein 6.4    8/8/23 vitamin D hydroxy level 29.92 L    RECENT LABS:  BMP: Date: 10/6/2023 Na 142 K 4.5 Cr 0.7 BUN 28 glucose 86 Ca 9.8 GFR 79  CBC: Date: 10/6/2023 WBC 6.2 Hgb 12.7 hematocrit 39.3 platelets 358    10/6/2023: TSH 2.652 within normal limits (range 0.340-5.500)    10/6/2023: Vitamin D  "25-hydroxy level 32.58 within normal limits (range )    11/2/2023: Vitamin D 25-hydroxy level 33.84    BMP: Date: 12/4/2023 Na 143 K 4.3 Cr 0.6 BUN 23 glucose 76 Ca 9.8 GFR 94  Liver function: date: 12/4/2023 ALT 10 AST 17 alkaline phos.  112 bilirubin 0.7 albumin 3.8 protein 6.5    12/4/2023 vitamin D 25-hydroxy level 56 wnl          /74   Pulse 80   Temp 36.8 °C (98.2 °F)   Resp 16   Ht 1.6 m (5' 3\")   Wt 46.7 kg (103 lb)   SpO2 99%   BMI 18.25 kg/m²      Physical Exam  Vitals and nursing note reviewed.   Constitutional:       Appearance: Normal appearance.   HENT:      Head: Normocephalic.      Right Ear: External ear normal.      Left Ear: External ear normal.      Nose: Nose normal.      Mouth/Throat:      Mouth: Mucous membranes are moist.      Pharynx: Oropharynx is clear.   Eyes:      Extraocular Movements: Extraocular movements intact.      Conjunctiva/sclera: Conjunctivae normal.      Pupils: Pupils are equal, round, and reactive to light.   Cardiovascular:      Rate and Rhythm: Normal rate and regular rhythm.      Pulses: Normal pulses.      Heart sounds: Normal heart sounds.   Pulmonary:      Effort: Pulmonary effort is normal.      Breath sounds: Normal breath sounds.   Abdominal:      General: Bowel sounds are normal.      Palpations: Abdomen is soft.   Musculoskeletal:         General: Normal range of motion.      Cervical back: Normal range of motion and neck supple.      Comments: Generalized muscle weakness; impaired gait and mobility   Skin:     General: Skin is warm and dry.   Neurological:      Mental Status: She is alert. Mental status is at baseline. She is disoriented.      Motor: Weakness present.      Coordination: Coordination abnormal.      Gait: Gait abnormal.   Psychiatric:         Mood and Affect: Affect is inappropriate.         Speech: Speech is tangential.         Cognition and Memory: Cognition is impaired. Memory is impaired.      Comments: Dementia   A x O x 1 "          Assessment/Plan   Ordered CBC with diff for tomorrow.           Dementia; Alzheimer's disease; Weight loss:   Continue donepezil.  Follow up with in-house psych NP at [facility].  Monitor for agitation.       In house dietician to follow up with patient on weights.  Continue house supplements shakes TID    Monitor weights.        Vitamin D deficiency:  DC vitamin D 3 5000 units PO every day.  Start vitamin D 3 1000 units PO every day.  Recheck vitamin D 25 hydroxy level in 6 weeks,    Sequela of  CVA/left sided weakness:  Patient had a stroke in October, 2020.  Continue ASA and Plavix.  Requires full supportive care.          Immobility; Generalized muscle weakness :    In wheelchair.  Able to propel her wheelchair independently.  Fall prevention strategies.      Problem List Items Addressed This Visit          Neuro    Dementia (CMS/HCC) - Primary     Other Visit Diagnoses       Alzheimer disease (CMS/[facility])        Weight loss        Vitamin D deficiency        Sequela of cerebrovascular accident        Left-sided weakness        Immobility        Generalized muscle weakness            YIMI Jordan       Electronically Signed By: YIMI Jordan   12/17/23  5:29 PM

## 2023-12-14 ENCOUNTER — NURSING HOME VISIT (OUTPATIENT)
Dept: POST ACUTE CARE | Facility: EXTERNAL LOCATION | Age: 88
End: 2023-12-14
Payer: COMMERCIAL

## 2023-12-14 DIAGNOSIS — F02.C18 SEVERE ALZHEIMER'S DEMENTIA WITH OTHER BEHAVIORAL DISTURBANCE, UNSPECIFIED TIMING OF DEMENTIA ONSET (MULTI): Primary | ICD-10-CM

## 2023-12-14 DIAGNOSIS — G30.9 SEVERE ALZHEIMER'S DEMENTIA WITH OTHER BEHAVIORAL DISTURBANCE, UNSPECIFIED TIMING OF DEMENTIA ONSET (MULTI): Primary | ICD-10-CM

## 2023-12-14 ASSESSMENT — ENCOUNTER SYMPTOMS
LOSS OF SENSATION IN FEET: 0
DEPRESSION: 0
OCCASIONAL FEELINGS OF UNSTEADINESS: 1

## 2023-12-14 NOTE — LETTER
Patient: Shonda Solano  : 3/14/1934    Encounter Date: 2023    No notes on file    Electronically Signed By: Indiana Wen MD   23  1:36 PM

## 2023-12-14 NOTE — PROGRESS NOTES
Ms Solano is a 89-year-old woman with history of Alzheimer's dementia,CVA(2020).   She is a long-term resident at [facility].   No recent falls. Sitting in dining [place]. No concerns by staff.   ROS: no changes in weight   Chest- no pain orpalpitations   Lungs- no cough or SOB   Abd- no pain, no N/V/D   Ext- no swelling   Psych- memory impaired   Vital signs: /74   Temperature 98.2   Pulse 80   Weight 103 pounds   Gen- NAD, pleasantly confused   Neck- supple, no jvd   Lungs: Lungs clear toauscultation   Cardio: S1-S2 normal   ABD: Soft nontender bowel sounds present   Musc/Skel: No deformities or edema   Extremities: No edema   Neuro- alert, no gross deformity   Psych: Memory impaired     Assessment and Plan:   CVA-stable   Continue with aspirin, Plavix   continue with fall and safety precautions   Alzheimer's dementia-stable   Continue with Aricept   Depression- followed by Psych   Continue with supportive care

## 2023-12-17 VITALS
BODY MASS INDEX: 18.25 KG/M2 | RESPIRATION RATE: 16 BRPM | HEIGHT: 63 IN | HEART RATE: 80 BPM | OXYGEN SATURATION: 99 % | WEIGHT: 103 LBS | TEMPERATURE: 98.2 F | DIASTOLIC BLOOD PRESSURE: 74 MMHG | SYSTOLIC BLOOD PRESSURE: 108 MMHG

## 2023-12-17 NOTE — PROGRESS NOTES
Name: Shonda Solano    : 3/14/1934    Code Status:  DNR-cc    Chief Complaint:  Follow up on Dementia & weight loss:    HPI   89 year old female patient at [facility] in Long Term Care.       Medical history includes: Difficulty in walking; muscle weakness (generalized); Acquired hammer toes on the right & left foot; Personal history of other infectious and parasitic diseases; disorientation; Alzheimer's disease; unspecified; symbolic dysfunctions; other speech and language deficits following unspecified cerebrovascular disease; mood disorder due to known physiological condition, unspecified; Bipolar disorder; Displaced intertrochanteric fracture of right femur; need for assistance with personal care; chronic kidney disease; primary osteoarthritis; unspecified ankle and foot; polyneuropathy, unspecified; unspecified abnormalities of gait and mobility; other osteoporosis without current pathological fracture.      Chronic comorbidities as follows:     Dementia; Alzheimer's disease; Weight loss:   On donepezil   Also follows up with in-house psych NP at .  No agitation reported.   Weights are trending down.  Recent weights as follows:  7/3/23 128.4 #  23 110.8 #  23 107.4 #  10/6/23 106.2 #  23 105.2 #  23 103 #   On house supplements shakes TID   Follows simple commands.  No complaints of chest pain.  No headaches or dizziness.  Mentation at baseline.   Patient seen today, she is sitting up in her wheelchair.   Calm, cooperative, pleasantly confused.   A x O x 1.   No complaints of headaches or dizziness.        Vitamin D deficiency:  On vitamin D 3 5000 units PO every day.  Recent vitamin D 25 hydroxy levels:  10/6/2023: Vitamin D 25-hydroxy level 32.58 within normal limits (range )   2023: Vitamin D 25-hydroxy level 33.84  2023 vitamin D 25-hydroxy level 56 wnl    Sequela of  CVA/left sided weakness:  Patient had a stroke in .  On ASA and  Plavix.  Requires full supportive care.         Immobility; Generalized muscle weakness :    In wheelchair. Spends most of the day sitting in a wheelchair.   Able to propel her wheelchair independently.                   Reviewed  EMR  Reviewed medical, social, surgical and family history.  Reviewed all current medications and performed medication reconciliation.  Performed prescription drug management.  Reviewed vital signs AND lab results  Reviewed [facility] Documentation  Discussed patient with nursing.       ROS: Limited ROS due to mentation; ROS negative unless noted in HPI.     Surgical History  Problems    · History of Femur fracture repair   · History of Tonsillectomy     Family History  Mother    · Family history of   Father    · Family history of      Social History   · Never a smoker  No alcohol  No illicit drugs  Lives in long term care facility              Past Medical History:   Diagnosis Date    Other malaise 2020    Physical deconditioning    Personal history of other diseases of the digestive system     History of constipation    Personal history of other diseases of the nervous system and sense organs 2020    History of polyneuropathy    Personal history of urinary (tract) infections     History of urinary tract infection    Unspecified dementia, unspecified severity, without behavioral disturbance, psychotic disturbance, mood disturbance, and anxiety (CMS/HCC) 12/10/2022    Dementia    Unspecified diastolic (congestive) heart failure (CMS/HCC)     Diastolic heart failure    Unspecified fracture of right femur, initial encounter for closed fracture (CMS/[facility])     Femur fracture, right             Lab Results   Component Value Date    WBC 9.1 2023    HGB 12.7 2023    HCT 41.6 2023     2023    ALT 8 2023    AST 14 2023     2023    K 4.1 2023     2023    CREATININE 0.81 2023    BUN 30  (H) 08/12/2023    CO2 21 08/12/2023    TSH 2.92 10/14/2020    INR 1.0 02/20/2022    HGBA1C 5.9 10/15/2020     Outside Labs  CBC: Date: 10/12/22, WBC: 4.8, Hgb: 11.9, Hct: 35.2, PLT: 290   CBC: Date: 7/7/22, WBC: 3.9, Hgb: 12.7, Hct: 38.5, PLT: 307   BMP: Date: 10/12/22, Na: 140, K: 4.3, Cl: 107, CO2: 25, BUN: 25, Cr: 0.6, Glu: 80, Ca: 9   BMP: Date: 7/7/22, Na: 141, K: 4.6, Cl: 105, CO2: 26, BUN: 30, Cr: 0.7, Glu: 113, Ca: 9.1   Hepatic Function Tests: Date: 3/8/22, AST: 15, ALT: 11, Alk Phos: 114, T Bili: 0.4, Albumin: 3.6   Hepatic Function Tests: Date: 3/9/21, AST: 15, ALT: 14, Alk Phos: 101, T Bili: 0.6, Albumin: 3.3   Lipid panel on 10/13/20 as follows: Cholesterol 186; triglyceride 84; HDL 51; ; VLDL 17.    Vitamin D 25 hydroxy level on 10/13/20 22 L    BMP: Date: 1/10/2023 Na 142 K 4.4 Cr 0.7 BUN 27 glucose 77 Ca 9.2 GFR 79  CBC: Date: 1/10/2023 WBC 6.9 Hgb 12.8 hematocrit 38.4 platelets 398  Liver function: Date: 1/10/2023 ALT 11 AST 18 alkaline phos.  112 bilirubin 0.5 albumin 3.7 protein 6.4    BMP: Date: 4/18/2023 Na 145 K 4 Cr 0.7 BUN 25 glucose 85 Ca 8.8 GFR 79  CBC: Date: 4/18/2023 WBC 5.2 Hgb 12.6 hematocrit 38.3 platelets 298  Liver function: Date: 4/18/2023 ALT 6 AST 11 alkaline phos.  94 bilirubin 0.5 albumin 3.4 protein 5.7   4/18/23 vitamin D 25 hydroxy level 19.62 L    BMP: Date: 8/8/2023 Na 142 K 4.9 Cr 0.6 BUN 24 glucose 65 Ca 9.2 GFR 94  CBC: Date: 8/8/2023 WBC 5.4 Hgb 12.8 hematocrit 40.4 platelets 398  Liver function: Date: 8/8/2023 ALT 8 AST 18 alkaline phos.  108 bilirubin 0.6 albumin 3.7 protein 6.4    8/8/23 vitamin D hydroxy level 29.92 L    RECENT LABS:  BMP: Date: 10/6/2023 Na 142 K 4.5 Cr 0.7 BUN 28 glucose 86 Ca 9.8 GFR 79  CBC: Date: 10/6/2023 WBC 6.2 Hgb 12.7 hematocrit 39.3 platelets 358    10/6/2023: TSH 2.652 within normal limits (range 0.340-5.500)    10/6/2023: Vitamin D 25-hydroxy level 32.58 within normal limits (range )    11/2/2023: Vitamin D 25-hydroxy  "level 33.84    BMP: Date: 12/4/2023 Na 143 K 4.3 Cr 0.6 BUN 23 glucose 76 Ca 9.8 GFR 94  Liver function: date: 12/4/2023 ALT 10 AST 17 alkaline phos.  112 bilirubin 0.7 albumin 3.8 protein 6.5    12/4/2023 vitamin D 25-hydroxy level 56 wnl          /74   Pulse 80   Temp 36.8 °C (98.2 °F)   Resp 16   Ht 1.6 m (5' 3\")   Wt 46.7 kg (103 lb)   SpO2 99%   BMI 18.25 kg/m²      Physical Exam  Vitals and nursing note reviewed.   Constitutional:       Appearance: Normal appearance.   HENT:      Head: Normocephalic.      Right Ear: External ear normal.      Left Ear: External ear normal.      Nose: Nose normal.      Mouth/Throat:      Mouth: Mucous membranes are moist.      Pharynx: Oropharynx is clear.   Eyes:      Extraocular Movements: Extraocular movements intact.      Conjunctiva/sclera: Conjunctivae normal.      Pupils: Pupils are equal, round, and reactive to light.   Cardiovascular:      Rate and Rhythm: Normal rate and regular rhythm.      Pulses: Normal pulses.      Heart sounds: Normal heart sounds.   Pulmonary:      Effort: Pulmonary effort is normal.      Breath sounds: Normal breath sounds.   Abdominal:      General: Bowel sounds are normal.      Palpations: Abdomen is soft.   Musculoskeletal:         General: Normal range of motion.      Cervical back: Normal range of motion and neck supple.      Comments: Generalized muscle weakness; impaired gait and mobility   Skin:     General: Skin is warm and dry.   Neurological:      Mental Status: She is alert. Mental status is at baseline. She is disoriented.      Motor: Weakness present.      Coordination: Coordination abnormal.      Gait: Gait abnormal.   Psychiatric:         Mood and Affect: Affect is inappropriate.         Speech: Speech is tangential.         Cognition and Memory: Cognition is impaired. Memory is impaired.      Comments: Dementia   A x O x 1          Assessment/Plan    Ordered CBC with diff for tomorrow.           Dementia; " Alzheimer's disease; Weight loss:   Continue donepezil.  Follow up with in-house psych NP at [facility].  Monitor for agitation.       In house dietician to follow up with patient on weights.  Continue house supplements shakes TID    Monitor weights.        Vitamin D deficiency:  DC vitamin D 3 5000 units PO every day.  Start vitamin D 3 1000 units PO every day.  Recheck vitamin D 25 hydroxy level in 6 weeks,    Sequela of  CVA/left sided weakness:  Patient had a stroke in October, 2020.  Continue ASA and Plavix.  Requires full supportive care.          Immobility; Generalized muscle weakness :    In wheelchair.  Able to propel her wheelchair independently.  Fall prevention strategies.      Problem List Items Addressed This Visit          Neuro    Dementia (CMS/[facility]) - Primary     Other Visit Diagnoses       Alzheimer disease (CMS/[facility])        Weight loss        Vitamin D deficiency        Sequela of cerebrovascular accident        Left-sided weakness        Immobility        Generalized muscle weakness            Sandi Rudolph, APRN-CNP

## 2024-01-04 ENCOUNTER — NURSING HOME VISIT (OUTPATIENT)
Dept: POST ACUTE CARE | Facility: EXTERNAL LOCATION | Age: 89
End: 2024-01-04
Payer: COMMERCIAL

## 2024-01-04 DIAGNOSIS — I69.30 SEQUELA OF CEREBROVASCULAR ACCIDENT: ICD-10-CM

## 2024-01-04 DIAGNOSIS — R53.1 LEFT-SIDED WEAKNESS: ICD-10-CM

## 2024-01-04 DIAGNOSIS — R63.4 WEIGHT LOSS: ICD-10-CM

## 2024-01-04 DIAGNOSIS — G30.9 ALZHEIMER DISEASE (MULTI): ICD-10-CM

## 2024-01-04 DIAGNOSIS — F02.80 ALZHEIMER DISEASE (MULTI): ICD-10-CM

## 2024-01-04 DIAGNOSIS — G30.9 SEVERE ALZHEIMER'S DEMENTIA WITH OTHER BEHAVIORAL DISTURBANCE, UNSPECIFIED TIMING OF DEMENTIA ONSET (MULTI): ICD-10-CM

## 2024-01-04 DIAGNOSIS — I10 BENIGN ESSENTIAL HTN: Primary | ICD-10-CM

## 2024-01-04 DIAGNOSIS — I50.9 CHRONIC CONGESTIVE HEART FAILURE, UNSPECIFIED HEART FAILURE TYPE (MULTI): ICD-10-CM

## 2024-01-04 DIAGNOSIS — F02.C18 SEVERE ALZHEIMER'S DEMENTIA WITH OTHER BEHAVIORAL DISTURBANCE, UNSPECIFIED TIMING OF DEMENTIA ONSET (MULTI): ICD-10-CM

## 2024-01-04 DIAGNOSIS — Z74.09 IMMOBILITY: ICD-10-CM

## 2024-01-04 PROCEDURE — 99308 SBSQ NF CARE LOW MDM 20: CPT | Performed by: NURSE PRACTITIONER

## 2024-01-04 NOTE — LETTER
Patient: Shonda Solano  : 3/14/1934    Encounter Date: 2024    Name: Shonda Solano    : 3/14/1934    Code Status:  DNR-cc    Chief Complaint:  Follow up on HTN; etc....    HPI   89 year old female patient at [facility] in [facility].       Medical history includes: Difficulty in walking; muscle weakness (generalized); Acquired hammer toes on the right & left foot; Personal history of other infectious and parasitic diseases; disorientation; Alzheimer's disease; unspecified; symbolic dysfunctions; other speech and language deficits following unspecified cerebrovascular disease; mood disorder due to known physiological condition, unspecified; Bipolar disorder; Displaced intertrochanteric fracture of right femur; need for assistance with personal care; chronic kidney disease; primary osteoarthritis; unspecified ankle and foot; polyneuropathy, unspecified; unspecified abnormalities of gait and mobility; other osteoporosis without current pathological fracture.      Chronic comorbidities as follows:     Benign essential HTN; Chronic diastolic heart disease:   Recent /65  Not on any medications for HTN.   No increased edema or SOB reported.   No complaints of chest pain, SOB, headaches or dizziness.  Patient seen today, she is sitting up in her wheelchair.   Calm, cooperative, pleasantly confused.   A x O x 1.   No complaints of headaches or dizziness.          Dementia; Alzheimer's disease; Weight loss:   On donepezil   Also follows up with in-house psych NP at .  No agitation reported.   Weights are trending down.  Recent weights as follows:  7/3/23 128.4 #  23 110.8 #  23 107.4 #  10/6/23 106.2 #  23 105.2 #  23 103 #  24 102.4 #   On house supplements shakes TID   Follows simple commands.  No complaints of chest pain.  No headaches or dizziness.  Mentation at baseline.   Patient seen today, she is sitting up in her wheelchair.   Calm, cooperative,  pleasantly confused.   A x O x 1.   No complaints of headaches or dizziness.        Sequela of  CVA/left sided weakness:  Patient had a stroke in .  On ASA and Plavix.  Requires full supportive care.                          Reviewed  EMR  Reviewed medical, social, surgical and family history.  Reviewed all current medications and performed medication reconciliation.  Performed prescription drug management.  Reviewed vital signs AND lab results  Reviewed [facility] Documentation  Discussed patient with nursing.       ROS: Limited ROS due to mentation; ROS negative unless noted in HPI.     Surgical History  Problems    · History of Femur fracture repair   · History of Tonsillectomy     Family History  Mother    · Family history of   Father    · Family history of      Social History   · Never a smoker  No alcohol  No illicit drugs  Lives in long term care facility              Past Medical History:   Diagnosis Date   • Other malaise 2020    Physical deconditioning   • Personal history of other diseases of the digestive system     History of constipation   • Personal history of other diseases of the nervous system and sense organs 2020    History of polyneuropathy   • Personal history of urinary (tract) infections     History of urinary tract infection   • Unspecified dementia, unspecified severity, without behavioral disturbance, psychotic disturbance, mood disturbance, and anxiety (CMS/[facility]) 12/10/2022    Dementia   • Unspecified diastolic (congestive) heart failure (CMS/[facility])     Diastolic heart failure   • Unspecified fracture of right femur, initial encounter for closed fracture (CMS/[facility])     Femur fracture, right             Lab Results   Component Value Date    WBC 9.1 2023    HGB 12.7 2023    HCT 41.6 2023     2023    ALT 8 2023    AST 14 2023     2023    K 4.1 2023     2023    CREATININE  0.81 08/12/2023    BUN 30 (H) 08/12/2023    CO2 21 08/12/2023    TSH 2.92 10/14/2020    INR 1.0 02/20/2022    HGBA1C 5.9 10/15/2020     Outside Labs  CBC: Date: 10/12/22, WBC: 4.8, Hgb: 11.9, Hct: 35.2, PLT: 290   CBC: Date: 7/7/22, WBC: 3.9, Hgb: 12.7, Hct: 38.5, PLT: 307   BMP: Date: 10/12/22, Na: 140, K: 4.3, Cl: 107, CO2: 25, BUN: 25, Cr: 0.6, Glu: 80, Ca: 9   BMP: Date: 7/7/22, Na: 141, K: 4.6, Cl: 105, CO2: 26, BUN: 30, Cr: 0.7, Glu: 113, Ca: 9.1   Hepatic Function Tests: Date: 3/8/22, AST: 15, ALT: 11, Alk Phos: 114, T Bili: 0.4, Albumin: 3.6   Hepatic Function Tests: Date: 3/9/21, AST: 15, ALT: 14, Alk Phos: 101, T Bili: 0.6, Albumin: 3.3   Lipid panel on 10/13/20 as follows: Cholesterol 186; triglyceride 84; HDL 51; ; VLDL 17.    Vitamin D 25 hydroxy level on 10/13/20 22 L    BMP: Date: 1/10/2023 Na 142 K 4.4 Cr 0.7 BUN 27 glucose 77 Ca 9.2 GFR 79  CBC: Date: 1/10/2023 WBC 6.9 Hgb 12.8 hematocrit 38.4 platelets 398  Liver function: Date: 1/10/2023 ALT 11 AST 18 alkaline phos.  112 bilirubin 0.5 albumin 3.7 protein 6.4    BMP: Date: 4/18/2023 Na 145 K 4 Cr 0.7 BUN 25 glucose 85 Ca 8.8 GFR 79  CBC: Date: 4/18/2023 WBC 5.2 Hgb 12.6 hematocrit 38.3 platelets 298  Liver function: Date: 4/18/2023 ALT 6 AST 11 alkaline phos.  94 bilirubin 0.5 albumin 3.4 protein 5.7   4/18/23 vitamin D 25 hydroxy level 19.62 L    BMP: Date: 8/8/2023 Na 142 K 4.9 Cr 0.6 BUN 24 glucose 65 Ca 9.2 GFR 94  CBC: Date: 8/8/2023 WBC 5.4 Hgb 12.8 hematocrit 40.4 platelets 398  Liver function: Date: 8/8/2023 ALT 8 AST 18 alkaline phos.  108 bilirubin 0.6 albumin 3.7 protein 6.4    8/8/23 vitamin D hydroxy level 29.92 L    BMP: Date: 10/6/2023 Na 142 K 4.5 Cr 0.7 BUN 28 glucose 86 Ca 9.8 GFR 79  CBC: Date: 10/6/2023 WBC 6.2 Hgb 12.7 hematocrit 39.3 platelets 358    10/6/2023: TSH 2.652 within normal limits (range 0.340-5.500)    10/6/2023: Vitamin D 25-hydroxy level 32.58 within normal limits (range )    11/2/2023: Vitamin D  "25-hydroxy level 33.84    BMP: Date: 12/4/2023 Na 143 K 4.3 Cr 0.6 BUN 23 glucose 76 Ca 9.8 GFR 94  Liver function: date: 12/4/2023 ALT 10 AST 17 alkaline phos.  112 bilirubin 0.7 albumin 3.8 protein 6.5    12/4/2023 vitamin D 25-hydroxy level 56 wnl    BMP: Date: 12/11/2023 Na 140 K 4.9 Cr 0.6 BUN 21 glucose 55 Ca 9.1 GFR 94  CBC: Date: 12/13/2023 WBC 5.9 Hgb 12.6 hematocrit 39.32 platelets 364  Liver function: Date: 12/11/2023 ALT 8 AST 15 alkaline phos.  97 bilirubin 0.5 albumin 3.3 protein 5.6    12/11/2023 vitamin D hydroxy level 43 wnl        /65   Pulse 72   Temp 36.6 °C (97.8 °F)   Resp 16   Ht 1.6 m (5' 3\")   Wt 46.4 kg (102 lb 6.4 oz)   SpO2 97%   BMI 18.14 kg/m²      Physical Exam  Vitals and nursing note reviewed.   Constitutional:       Appearance: Normal appearance.   HENT:      Head: Normocephalic.      Right Ear: External ear normal.      Left Ear: External ear normal.      Nose: Nose normal.      Mouth/Throat:      Mouth: Mucous membranes are moist.      Pharynx: Oropharynx is clear.   Eyes:      Extraocular Movements: Extraocular movements intact.      Conjunctiva/sclera: Conjunctivae normal.      Pupils: Pupils are equal, round, and reactive to light.   Cardiovascular:      Rate and Rhythm: Normal rate and regular rhythm.      Pulses: Normal pulses.      Heart sounds: Normal heart sounds.   Pulmonary:      Effort: Pulmonary effort is normal.      Breath sounds: Normal breath sounds.   Abdominal:      General: Bowel sounds are normal.      Palpations: Abdomen is soft.   Musculoskeletal:         General: Normal range of motion.      Cervical back: Normal range of motion and neck supple.      Comments: Generalized muscle weakness; impaired gait and mobility   Skin:     General: Skin is warm and dry.   Neurological:      Mental Status: She is alert. Mental status is at baseline. She is disoriented.      Motor: Weakness present.      Coordination: Coordination abnormal.      Gait: Gait " abnormal.   Psychiatric:         Mood and Affect: Affect is inappropriate.         Speech: Speech is tangential.         Cognition and Memory: Cognition is impaired. Memory is impaired.      Comments: Dementia   A x O x 1          Assessment/Plan        Benign essential HTN; Chronic diastolic heart disease:   Monitor Bps.  Follow up with in house cardiology PRN.  If necessary restart medications for HTN.        Dementia; Alzheimer's disease; Weight loss:   Continue donepezil.  Follow up with in-house psych NP at [facility].  Monitor for agitation.       In house dietician to follow up with patient on weights.  Continue house supplements shakes TID    Monitor weights.        Sequela of  CVA/left sided weakness; immobility:  Patient had a stroke in October, 2020.  Continue ASA and Plavix.  Requires full supportive care.             Problem List Items Addressed This Visit          Neuro    Dementia (CMS/HCC)     Other Visit Diagnoses       Benign essential HTN    -  Primary    Chronic congestive heart failure, unspecified heart failure type (CMS/HCC)        Alzheimer disease (CMS/HCC)        Weight loss        Sequela of cerebrovascular accident        Left-sided weakness        Immobility            YIMI Jordan       Electronically Signed By: YIMI Jordan   1/7/24 10:55 PM

## 2024-01-07 VITALS
RESPIRATION RATE: 16 BRPM | BODY MASS INDEX: 18.14 KG/M2 | WEIGHT: 102.4 LBS | SYSTOLIC BLOOD PRESSURE: 108 MMHG | HEART RATE: 72 BPM | OXYGEN SATURATION: 97 % | TEMPERATURE: 97.8 F | DIASTOLIC BLOOD PRESSURE: 65 MMHG | HEIGHT: 63 IN

## 2024-01-08 NOTE — PROGRESS NOTES
Name: Shonda Solano    : 3/14/1934    Code Status:  DNR-cc    Chief Complaint:  Follow up on HTN; etc....    HPI   89 year old female patient at [facility] in [facility] Term [facility].       Medical history includes: Difficulty in walking; muscle weakness (generalized); Acquired hammer toes on the right & left foot; Personal history of other infectious and parasitic diseases; disorientation; Alzheimer's disease; unspecified; symbolic dysfunctions; other speech and language deficits following unspecified cerebrovascular disease; mood disorder due to known physiological condition, unspecified; Bipolar disorder; Displaced intertrochanteric fracture of right femur; need for assistance with personal care; chronic kidney disease; primary osteoarthritis; unspecified ankle and foot; polyneuropathy, unspecified; unspecified abnormalities of gait and mobility; other osteoporosis without current pathological fracture.      Chronic comorbidities as follows:     Benign essential HTN; Chronic diastolic heart disease:   Recent /65  Not on any medications for HTN.   No increased edema or SOB reported.   No complaints of chest pain, SOB, headaches or dizziness.  Patient seen today, she is sitting up in her wheelchair.   Calm, cooperative, pleasantly confused.   A x O x 1.   No complaints of headaches or dizziness.          Dementia; Alzheimer's disease; Weight loss:   On donepezil   Also follows up with in-house psych NP at .  No agitation reported.   Weights are trending down.  Recent weights as follows:  7/3/23 128.4 #  23 110.8 #  23 107.4 #  10/6/23 106.2 #  23 105.2 #  23 103 #  24 102.4 #   On house supplements shakes TID   Follows simple commands.  No complaints of chest pain.  No headaches or dizziness.  Mentation at baseline.   Patient seen today, she is sitting up in her wheelchair.   Calm, cooperative, pleasantly confused.   A x O x 1.   No complaints of headaches or  dizziness.        Sequela of  CVA/left sided weakness:  Patient had a stroke in .  On ASA and Plavix.  Requires full supportive care.                          Reviewed  EMR  Reviewed medical, social, surgical and family history.  Reviewed all current medications and performed medication reconciliation.  Performed prescription drug management.  Reviewed vital signs AND lab results  Reviewed [facility] Documentation  Discussed patient with nursing.       ROS: Limited ROS due to mentation; ROS negative unless noted in HPI.     Surgical History  Problems    · History of Femur fracture repair   · History of Tonsillectomy     Family History  Mother    · Family history of   Father    · Family history of      Social History   · Never a smoker  No alcohol  No illicit drugs  Lives in long term care facility              Past Medical History:   Diagnosis Date    Other malaise 2020    Physical deconditioning    Personal history of other diseases of the digestive system     History of constipation    Personal history of other diseases of the nervous system and sense organs 2020    History of polyneuropathy    Personal history of urinary (tract) infections     History of urinary tract infection    Unspecified dementia, unspecified severity, without behavioral disturbance, psychotic disturbance, mood disturbance, and anxiety (CMS/[facility]) 12/10/2022    Dementia    Unspecified diastolic (congestive) heart failure (CMS/[facility])     Diastolic heart failure    Unspecified fracture of right femur, initial encounter for closed fracture (CMS/[facility])     Femur fracture, right             Lab Results   Component Value Date    WBC 9.1 2023    HGB 12.7 2023    HCT 41.6 2023     2023    ALT 8 2023    AST 14 2023     2023    K 4.1 2023     2023    CREATININE 0.81 2023    BUN 30 (H) 2023    CO2 21 2023    TSH 2.92  10/14/2020    INR 1.0 02/20/2022    HGBA1C 5.9 10/15/2020     Outside Labs  CBC: Date: 10/12/22, WBC: 4.8, Hgb: 11.9, Hct: 35.2, PLT: 290   CBC: Date: 7/7/22, WBC: 3.9, Hgb: 12.7, Hct: 38.5, PLT: 307   BMP: Date: 10/12/22, Na: 140, K: 4.3, Cl: 107, CO2: 25, BUN: 25, Cr: 0.6, Glu: 80, Ca: 9   BMP: Date: 7/7/22, Na: 141, K: 4.6, Cl: 105, CO2: 26, BUN: 30, Cr: 0.7, Glu: 113, Ca: 9.1   Hepatic Function Tests: Date: 3/8/22, AST: 15, ALT: 11, Alk Phos: 114, T Bili: 0.4, Albumin: 3.6   Hepatic Function Tests: Date: 3/9/21, AST: 15, ALT: 14, Alk Phos: 101, T Bili: 0.6, Albumin: 3.3   Lipid panel on 10/13/20 as follows: Cholesterol 186; triglyceride 84; HDL 51; ; VLDL 17.    Vitamin D 25 hydroxy level on 10/13/20 22 L    BMP: Date: 1/10/2023 Na 142 K 4.4 Cr 0.7 BUN 27 glucose 77 Ca 9.2 GFR 79  CBC: Date: 1/10/2023 WBC 6.9 Hgb 12.8 hematocrit 38.4 platelets 398  Liver function: Date: 1/10/2023 ALT 11 AST 18 alkaline phos.  112 bilirubin 0.5 albumin 3.7 protein 6.4    BMP: Date: 4/18/2023 Na 145 K 4 Cr 0.7 BUN 25 glucose 85 Ca 8.8 GFR 79  CBC: Date: 4/18/2023 WBC 5.2 Hgb 12.6 hematocrit 38.3 platelets 298  Liver function: Date: 4/18/2023 ALT 6 AST 11 alkaline phos.  94 bilirubin 0.5 albumin 3.4 protein 5.7   4/18/23 vitamin D 25 hydroxy level 19.62 L    BMP: Date: 8/8/2023 Na 142 K 4.9 Cr 0.6 BUN 24 glucose 65 Ca 9.2 GFR 94  CBC: Date: 8/8/2023 WBC 5.4 Hgb 12.8 hematocrit 40.4 platelets 398  Liver function: Date: 8/8/2023 ALT 8 AST 18 alkaline phos.  108 bilirubin 0.6 albumin 3.7 protein 6.4    8/8/23 vitamin D hydroxy level 29.92 L    BMP: Date: 10/6/2023 Na 142 K 4.5 Cr 0.7 BUN 28 glucose 86 Ca 9.8 GFR 79  CBC: Date: 10/6/2023 WBC 6.2 Hgb 12.7 hematocrit 39.3 platelets 358    10/6/2023: TSH 2.652 within normal limits (range 0.340-5.500)    10/6/2023: Vitamin D 25-hydroxy level 32.58 within normal limits (range )    11/2/2023: Vitamin D 25-hydroxy level 33.84    BMP: Date: 12/4/2023 Na 143 K 4.3 Cr 0.6 BUN 23  "glucose 76 Ca 9.8 GFR 94  Liver function: date: 12/4/2023 ALT 10 AST 17 alkaline phos.  112 bilirubin 0.7 albumin 3.8 protein 6.5    12/4/2023 vitamin D 25-hydroxy level 56 wnl    BMP: Date: 12/11/2023 Na 140 K 4.9 Cr 0.6 BUN 21 glucose 55 Ca 9.1 GFR 94  CBC: Date: 12/13/2023 WBC 5.9 Hgb 12.6 hematocrit 39.32 platelets 364  Liver function: Date: 12/11/2023 ALT 8 AST 15 alkaline phos.  97 bilirubin 0.5 albumin 3.3 protein 5.6    12/11/2023 vitamin D hydroxy level 43 wnl        /65   Pulse 72   Temp 36.6 °C (97.8 °F)   Resp 16   Ht 1.6 m (5' 3\")   Wt 46.4 kg (102 lb 6.4 oz)   SpO2 97%   BMI 18.14 kg/m²      Physical Exam  Vitals and nursing note reviewed.   Constitutional:       Appearance: Normal appearance.   HENT:      Head: Normocephalic.      Right Ear: External ear normal.      Left Ear: External ear normal.      Nose: Nose normal.      Mouth/Throat:      Mouth: Mucous membranes are moist.      Pharynx: Oropharynx is clear.   Eyes:      Extraocular Movements: Extraocular movements intact.      Conjunctiva/sclera: Conjunctivae normal.      Pupils: Pupils are equal, round, and reactive to light.   Cardiovascular:      Rate and Rhythm: Normal rate and regular rhythm.      Pulses: Normal pulses.      Heart sounds: Normal heart sounds.   Pulmonary:      Effort: Pulmonary effort is normal.      Breath sounds: Normal breath sounds.   Abdominal:      General: Bowel sounds are normal.      Palpations: Abdomen is soft.   Musculoskeletal:         General: Normal range of motion.      Cervical back: Normal range of motion and neck supple.      Comments: Generalized muscle weakness; impaired gait and mobility   Skin:     General: Skin is warm and dry.   Neurological:      Mental Status: She is alert. Mental status is at baseline. She is disoriented.      Motor: Weakness present.      Coordination: Coordination abnormal.      Gait: Gait abnormal.   Psychiatric:         Mood and Affect: Affect is inappropriate.    "      Speech: Speech is tangential.         Cognition and Memory: Cognition is impaired. Memory is impaired.      Comments: Dementia   A x O x 1          Assessment/Plan         Benign essential HTN; Chronic diastolic heart disease:   Monitor Bps.  Follow up with in house cardiology PRN.  If necessary restart medications for HTN.        Dementia; Alzheimer's disease; Weight loss:   Continue donepezil.  Follow up with in-house psych NP at [facility].  Monitor for agitation.       In house dietician to follow up with patient on weights.  Continue house supplements shakes TID    Monitor weights.        Sequela of  CVA/left sided weakness; immobility:  Patient had a stroke in October, 2020.  Continue ASA and Plavix.  Requires full supportive care.             Problem List Items Addressed This Visit          Neuro    Dementia (CMS/HCC)     Other Visit Diagnoses       Benign essential HTN    -  Primary    Chronic congestive heart failure, unspecified heart failure type (CMS/HCC)        Alzheimer disease (CMS/HCC)        Weight loss        Sequela of cerebrovascular accident        Left-sided weakness        Immobility            Sandi Rudolph, APRN-CNP

## 2024-01-30 ENCOUNTER — NURSING HOME VISIT (OUTPATIENT)
Dept: POST ACUTE CARE | Facility: EXTERNAL LOCATION | Age: 89
End: 2024-01-30
Payer: COMMERCIAL

## 2024-01-30 DIAGNOSIS — Z74.09 IMMOBILITY: ICD-10-CM

## 2024-01-30 DIAGNOSIS — I10 BENIGN ESSENTIAL HTN: ICD-10-CM

## 2024-01-30 DIAGNOSIS — G30.9 ALZHEIMER DISEASE (MULTI): ICD-10-CM

## 2024-01-30 DIAGNOSIS — I50.9 CHRONIC CONGESTIVE HEART FAILURE, UNSPECIFIED HEART FAILURE TYPE (MULTI): ICD-10-CM

## 2024-01-30 DIAGNOSIS — F02.80 ALZHEIMER DISEASE (MULTI): ICD-10-CM

## 2024-01-30 DIAGNOSIS — I69.30 SEQUELA OF CEREBROVASCULAR ACCIDENT: ICD-10-CM

## 2024-01-30 DIAGNOSIS — M62.81 GENERALIZED MUSCLE WEAKNESS: ICD-10-CM

## 2024-01-30 DIAGNOSIS — R53.1 LEFT-SIDED WEAKNESS: ICD-10-CM

## 2024-01-30 DIAGNOSIS — R63.4 WEIGHT LOSS: ICD-10-CM

## 2024-01-30 DIAGNOSIS — F02.C18 SEVERE ALZHEIMER'S DEMENTIA WITH OTHER BEHAVIORAL DISTURBANCE, UNSPECIFIED TIMING OF DEMENTIA ONSET (MULTI): Primary | ICD-10-CM

## 2024-01-30 DIAGNOSIS — G30.9 SEVERE ALZHEIMER'S DEMENTIA WITH OTHER BEHAVIORAL DISTURBANCE, UNSPECIFIED TIMING OF DEMENTIA ONSET (MULTI): Primary | ICD-10-CM

## 2024-01-30 PROCEDURE — 99308 SBSQ NF CARE LOW MDM 20: CPT | Performed by: NURSE PRACTITIONER

## 2024-01-30 NOTE — LETTER
Patient: Shonda Solano  : 3/14/1934    Encounter Date: 2024    Name: Shonda Solano    : 3/14/1934    Code Status:  DNR-cc    Chief Complaint:  Follow up on dementia; etc....    HPI   89 year old female patient at [facility] in Long Term Care.       Medical history includes: Difficulty in walking; muscle weakness (generalized); Acquired hammer toes on the right & left foot; Personal history of other infectious and parasitic diseases; disorientation; Alzheimer's disease; unspecified; symbolic dysfunctions; other speech and language deficits following unspecified cerebrovascular disease; mood disorder due to known physiological condition, unspecified; Bipolar disorder; Displaced intertrochanteric fracture of right femur; need for assistance with personal care; chronic kidney disease; primary osteoarthritis; unspecified ankle and foot; polyneuropathy, unspecified; unspecified abnormalities of gait and mobility; other osteoporosis without current pathological fracture.      Chronic comorbidities as follows:             Dementia; Alzheimer's disease; Weight loss:   On donepezil   Also follows up with in-house psych NP at .  No agitation reported.   Weights are trending down.  Recent weights as follows:  23 110.8 #  23 107.4 #  10/6/23 106.2 #  23 105.2 #  23 103 #  24 102.4 #   On house supplements shakes TID   Follows simple commands.  No complaints of chest pain.  No headaches or dizziness.  Mentation at baseline.   Patient seen today, she is sitting up in her wheelchair.   Calm, cooperative, pleasantly confused.   A x O x 1.   No complaints of headaches or dizziness.      Benign essential HTN; Chronic diastolic heart disease:   Recent /65  Not on any medications for HTN.   No increased edema or SOB reported.   No complaints of chest pain, SOB, headaches or dizziness.  No complaints of headaches or dizziness.  No weight gain reported.          Sequela of   CVA/left sided weakness; immobility; generalized muscle weakness:  Patient had a stroke in .  On ASA and Plavix.  Requires full supportive care.   Very weak.  In wheelchair.                         Reviewed  EMR  Reviewed medical, social, surgical and family history.  Reviewed all current medications and performed medication reconciliation.  Performed prescription drug management.  Reviewed vital signs AND lab results  Reviewed [facility] Documentation  Discussed patient with nursing.       ROS: Limited ROS due to mentation; ROS negative unless noted in HPI.     Surgical History  Problems    · History of Femur fracture repair   · History of Tonsillectomy     Family History  Mother    · Family history of   Father    · Family history of      Social History   · Never a smoker  No alcohol  No illicit drugs  Lives in long term care facility              Past Medical History:   Diagnosis Date   • Other malaise 2020    Physical deconditioning   • Personal history of other diseases of the digestive system     History of constipation   • Personal history of other diseases of the nervous system and sense organs 2020    History of polyneuropathy   • Personal history of urinary (tract) infections     History of urinary tract infection   • Unspecified dementia, unspecified severity, without behavioral disturbance, psychotic disturbance, mood disturbance, and anxiety (CMS/HCC) 12/10/2022    Dementia   • Unspecified diastolic (congestive) heart failure (CMS/HCC)     Diastolic heart failure   • Unspecified fracture of right femur, initial encounter for closed fracture (CMS/[facility])     Femur fracture, right             Lab Results   Component Value Date    WBC 9.1 2023    HGB 12.7 2023    HCT 41.6 2023     2023    ALT 8 2023    AST 14 2023     2023    K 4.1 2023     2023    CREATININE 0.81 2023    BUN 30 (H)  08/12/2023    CO2 21 08/12/2023    TSH 2.92 10/14/2020    INR 1.0 02/20/2022    HGBA1C 5.9 10/15/2020     Outside Labs  CBC: Date: 10/12/22, WBC: 4.8, Hgb: 11.9, Hct: 35.2, PLT: 290   CBC: Date: 7/7/22, WBC: 3.9, Hgb: 12.7, Hct: 38.5, PLT: 307   BMP: Date: 10/12/22, Na: 140, K: 4.3, Cl: 107, CO2: 25, BUN: 25, Cr: 0.6, Glu: 80, Ca: 9   BMP: Date: 7/7/22, Na: 141, K: 4.6, Cl: 105, CO2: 26, BUN: 30, Cr: 0.7, Glu: 113, Ca: 9.1   Hepatic Function Tests: Date: 3/8/22, AST: 15, ALT: 11, Alk Phos: 114, T Bili: 0.4, Albumin: 3.6   Hepatic Function Tests: Date: 3/9/21, AST: 15, ALT: 14, Alk Phos: 101, T Bili: 0.6, Albumin: 3.3   Lipid panel on 10/13/20 as follows: Cholesterol 186; triglyceride 84; HDL 51; ; VLDL 17.    Vitamin D 25 hydroxy level on 10/13/20 22 L    BMP: Date: 1/10/2023 Na 142 K 4.4 Cr 0.7 BUN 27 glucose 77 Ca 9.2 GFR 79  CBC: Date: 1/10/2023 WBC 6.9 Hgb 12.8 hematocrit 38.4 platelets 398  Liver function: Date: 1/10/2023 ALT 11 AST 18 alkaline phos.  112 bilirubin 0.5 albumin 3.7 protein 6.4    BMP: Date: 4/18/2023 Na 145 K 4 Cr 0.7 BUN 25 glucose 85 Ca 8.8 GFR 79  CBC: Date: 4/18/2023 WBC 5.2 Hgb 12.6 hematocrit 38.3 platelets 298  Liver function: Date: 4/18/2023 ALT 6 AST 11 alkaline phos.  94 bilirubin 0.5 albumin 3.4 protein 5.7   4/18/23 vitamin D 25 hydroxy level 19.62 L    BMP: Date: 8/8/2023 Na 142 K 4.9 Cr 0.6 BUN 24 glucose 65 Ca 9.2 GFR 94  CBC: Date: 8/8/2023 WBC 5.4 Hgb 12.8 hematocrit 40.4 platelets 398  Liver function: Date: 8/8/2023 ALT 8 AST 18 alkaline phos.  108 bilirubin 0.6 albumin 3.7 protein 6.4    8/8/23 vitamin D hydroxy level 29.92 L    BMP: Date: 10/6/2023 Na 142 K 4.5 Cr 0.7 BUN 28 glucose 86 Ca 9.8 GFR 79  CBC: Date: 10/6/2023 WBC 6.2 Hgb 12.7 hematocrit 39.3 platelets 358    10/6/2023: TSH 2.652 within normal limits (range 0.340-5.500)    10/6/2023: Vitamin D 25-hydroxy level 32.58 within normal limits (range )    11/2/2023: Vitamin D 25-hydroxy level 33.84    BMP:  "Date: 12/4/2023 Na 143 K 4.3 Cr 0.6 BUN 23 glucose 76 Ca 9.8 GFR 94  Liver function: date: 12/4/2023 ALT 10 AST 17 alkaline phos.  112 bilirubin 0.7 albumin 3.8 protein 6.5    12/4/2023 vitamin D 25-hydroxy level 56 wnl    BMP: Date: 12/11/2023 Na 140 K 4.9 Cr 0.6 BUN 21 glucose 55 Ca 9.1 GFR 94  CBC: Date: 12/13/2023 WBC 5.9 Hgb 12.6 hematocrit 39.32 platelets 364  Liver function: Date: 12/11/2023 ALT 8 AST 15 alkaline phos.  97 bilirubin 0.5 albumin 3.3 protein 5.6    12/11/2023 vitamin D hydroxy level 43 wnl    BMP: Date: 1/29/2024 Na 142 K 4.1 Cr 0.7 BUN 23 glucose 81 Ca 9.7 GFR 79  CBC: Date: 1/29/2024 WBC 6.7 Hgb 13.1 hematocrit 40.6 platelets 368  Liver function: Date: 1/29/2024 ALT 14 AST 21 alkaline phos.  131 bilirubin 0.8 albumin 4 protein 6.7        /65   Pulse 72   Temp 36.4 °C (97.6 °F)   Resp 16   Ht 1.6 m (5' 3\")   Wt 46.4 kg (102 lb 6.4 oz)   SpO2 96%   BMI 18.14 kg/m²      Physical Exam  Vitals and nursing note reviewed.   Constitutional:       Appearance: Normal appearance.   HENT:      Head: Normocephalic.      Right Ear: External ear normal.      Left Ear: External ear normal.      Nose: Nose normal.      Mouth/Throat:      Mouth: Mucous membranes are moist.      Pharynx: Oropharynx is clear.   Eyes:      Extraocular Movements: Extraocular movements intact.      Conjunctiva/sclera: Conjunctivae normal.      Pupils: Pupils are equal, round, and reactive to light.   Cardiovascular:      Rate and Rhythm: Normal rate and regular rhythm.      Pulses: Normal pulses.      Heart sounds: Normal heart sounds.   Pulmonary:      Effort: Pulmonary effort is normal.      Breath sounds: Normal breath sounds.   Abdominal:      General: Bowel sounds are normal.      Palpations: Abdomen is soft.   Musculoskeletal:         General: Normal range of motion.      Cervical back: Normal range of motion and neck supple.      Comments: Generalized muscle weakness; impaired gait and mobility   Skin:     " General: Skin is warm and dry.   Neurological:      Mental Status: She is alert. Mental status is at baseline. She is disoriented.      Motor: Weakness present.      Coordination: Coordination abnormal.      Gait: Gait abnormal.   Psychiatric:         Mood and Affect: Affect is inappropriate.         Speech: Speech is tangential.         Cognition and Memory: Cognition is impaired. Memory is impaired.      Comments: Dementia   A x O x 1          Assessment/Plan      Dementia; Alzheimer's disease; Weight loss:   Continue donepezil.  Follow up with in-house psych NP at [facility].  Monitor for agitation.       In house dietician to follow up with patient on weights.  Continue house supplements shakes TID    Monitor weights.         Benign essential HTN; Chronic diastolic heart disease:   Monitor Bps.  Follow up with in house cardiology PR[facility].  If necessary restart medications for HTN.             Sequela of  CVA/left sided weakness; immobility; generalized muscle weakness:  Patient had a stroke in October, 2020.  Continue ASA and Plavix.  Requires full supportive care.  Continue to use wheelchair.  Fall prevention strategies.             Problem List Items Addressed This Visit          Neuro    Dementia (CMS/HCC) - Primary     Other Visit Diagnoses       Alzheimer disease (CMS/HCC)        Weight loss        Benign essential HTN        Chronic congestive heart failure, unspecified heart failure type (CMS/HCC)        Sequela of cerebrovascular accident        Left-sided weakness        Immobility        Generalized muscle weakness            YIMI Jordan       Electronically Signed By: YMII Jordan   2/2/24  4:16 PM

## 2024-02-02 VITALS
HEART RATE: 72 BPM | WEIGHT: 102.4 LBS | HEIGHT: 63 IN | SYSTOLIC BLOOD PRESSURE: 108 MMHG | OXYGEN SATURATION: 96 % | RESPIRATION RATE: 16 BRPM | TEMPERATURE: 97.6 F | BODY MASS INDEX: 18.14 KG/M2 | DIASTOLIC BLOOD PRESSURE: 65 MMHG

## 2024-02-02 NOTE — PROGRESS NOTES
Name: Shonda Solano    : 3/14/1934    Code Status:  DNR-cc    Chief Complaint:  Follow up on dementia; etc....    HPI   89 year old female patient at [facility] in Long Term Care.       Medical history includes: Difficulty in walking; muscle weakness (generalized); Acquired hammer toes on the right & left foot; Personal history of other infectious and parasitic diseases; disorientation; Alzheimer's disease; unspecified; symbolic dysfunctions; other speech and language deficits following unspecified cerebrovascular disease; mood disorder due to known physiological condition, unspecified; Bipolar disorder; Displaced intertrochanteric fracture of right femur; need for assistance with personal care; chronic kidney disease; primary osteoarthritis; unspecified ankle and foot; polyneuropathy, unspecified; unspecified abnormalities of gait and mobility; other osteoporosis without current pathological fracture.      Chronic comorbidities as follows:             Dementia; Alzheimer's disease; Weight loss:   On donepezil   Also follows up with in-house psych NP at .  No agitation reported.   Weights are trending down.  Recent weights as follows:  23 110.8 #  23 107.4 #  10/6/23 106.2 #  23 105.2 #  23 103 #  24 102.4 #   On house supplements shakes TID   Follows simple commands.  No complaints of chest pain.  No headaches or dizziness.  Mentation at baseline.   Patient seen today, she is sitting up in her wheelchair.   Calm, cooperative, pleasantly confused.   A x O x 1.   No complaints of headaches or dizziness.      Benign essential HTN; Chronic diastolic heart disease:   Recent /65  Not on any medications for HTN.   No increased edema or SOB reported.   No complaints of chest pain, SOB, headaches or dizziness.  No complaints of headaches or dizziness.  No weight gain reported.          Sequela of  CVA/left sided weakness; immobility; generalized muscle  weakness:  Patient had a stroke in .  On ASA and Plavix.  Requires full supportive care.   Very weak.  In wheelchair.                         Reviewed  EMR  Reviewed medical, social, surgical and family history.  Reviewed all current medications and performed medication reconciliation.  Performed prescription drug management.  Reviewed vital signs AND lab results  Reviewed [facility] Documentation  Discussed patient with nursing.       ROS: Limited ROS due to mentation; ROS negative unless noted in HPI.     Surgical History  Problems    · History of Femur fracture repair   · History of Tonsillectomy     Family History  Mother    · Family history of   Father    · Family history of      Social History   · Never a smoker  No alcohol  No illicit drugs  Lives in long term care facility              Past Medical History:   Diagnosis Date    Other malaise 2020    Physical deconditioning    Personal history of other diseases of the digestive system     History of constipation    Personal history of other diseases of the nervous system and sense organs 2020    History of polyneuropathy    Personal history of urinary (tract) infections     History of urinary tract infection    Unspecified dementia, unspecified severity, without behavioral disturbance, psychotic disturbance, mood disturbance, and anxiety (CMS/[facility]) 12/10/2022    Dementia    Unspecified diastolic (congestive) heart failure (CMS/[facility])     Diastolic heart failure    Unspecified fracture of right femur, initial encounter for closed fracture (CMS/[facility])     Femur fracture, right             Lab Results   Component Value Date    WBC 9.1 2023    HGB 12.7 2023    HCT 41.6 2023     2023    ALT 8 2023    AST 14 2023     2023    K 4.1 2023     2023    CREATININE 0.81 2023    BUN 30 (H) 2023    CO2 21 2023    TSH 2.92 10/14/2020    INR  1.0 02/20/2022    HGBA1C 5.9 10/15/2020     Outside Labs  CBC: Date: 10/12/22, WBC: 4.8, Hgb: 11.9, Hct: 35.2, PLT: 290   CBC: Date: 7/7/22, WBC: 3.9, Hgb: 12.7, Hct: 38.5, PLT: 307   BMP: Date: 10/12/22, Na: 140, K: 4.3, Cl: 107, CO2: 25, BUN: 25, Cr: 0.6, Glu: 80, Ca: 9   BMP: Date: 7/7/22, Na: 141, K: 4.6, Cl: 105, CO2: 26, BUN: 30, Cr: 0.7, Glu: 113, Ca: 9.1   Hepatic Function Tests: Date: 3/8/22, AST: 15, ALT: 11, Alk Phos: 114, T Bili: 0.4, Albumin: 3.6   Hepatic Function Tests: Date: 3/9/21, AST: 15, ALT: 14, Alk Phos: 101, T Bili: 0.6, Albumin: 3.3   Lipid panel on 10/13/20 as follows: Cholesterol 186; triglyceride 84; HDL 51; ; VLDL 17.    Vitamin D 25 hydroxy level on 10/13/20 22 L    BMP: Date: 1/10/2023 Na 142 K 4.4 Cr 0.7 BUN 27 glucose 77 Ca 9.2 GFR 79  CBC: Date: 1/10/2023 WBC 6.9 Hgb 12.8 hematocrit 38.4 platelets 398  Liver function: Date: 1/10/2023 ALT 11 AST 18 alkaline phos.  112 bilirubin 0.5 albumin 3.7 protein 6.4    BMP: Date: 4/18/2023 Na 145 K 4 Cr 0.7 BUN 25 glucose 85 Ca 8.8 GFR 79  CBC: Date: 4/18/2023 WBC 5.2 Hgb 12.6 hematocrit 38.3 platelets 298  Liver function: Date: 4/18/2023 ALT 6 AST 11 alkaline phos.  94 bilirubin 0.5 albumin 3.4 protein 5.7   4/18/23 vitamin D 25 hydroxy level 19.62 L    BMP: Date: 8/8/2023 Na 142 K 4.9 Cr 0.6 BUN 24 glucose 65 Ca 9.2 GFR 94  CBC: Date: 8/8/2023 WBC 5.4 Hgb 12.8 hematocrit 40.4 platelets 398  Liver function: Date: 8/8/2023 ALT 8 AST 18 alkaline phos.  108 bilirubin 0.6 albumin 3.7 protein 6.4    8/8/23 vitamin D hydroxy level 29.92 L    BMP: Date: 10/6/2023 Na 142 K 4.5 Cr 0.7 BUN 28 glucose 86 Ca 9.8 GFR 79  CBC: Date: 10/6/2023 WBC 6.2 Hgb 12.7 hematocrit 39.3 platelets 358    10/6/2023: TSH 2.652 within normal limits (range 0.340-5.500)    10/6/2023: Vitamin D 25-hydroxy level 32.58 within normal limits (range )    11/2/2023: Vitamin D 25-hydroxy level 33.84    BMP: Date: 12/4/2023 Na 143 K 4.3 Cr 0.6 BUN 23 glucose 76 Ca 9.8 GFR  "94  Liver function: date: 12/4/2023 ALT 10 AST 17 alkaline phos.  112 bilirubin 0.7 albumin 3.8 protein 6.5    12/4/2023 vitamin D 25-hydroxy level 56 wnl    BMP: Date: 12/11/2023 Na 140 K 4.9 Cr 0.6 BUN 21 glucose 55 Ca 9.1 GFR 94  CBC: Date: 12/13/2023 WBC 5.9 Hgb 12.6 hematocrit 39.32 platelets 364  Liver function: Date: 12/11/2023 ALT 8 AST 15 alkaline phos.  97 bilirubin 0.5 albumin 3.3 protein 5.6    12/11/2023 vitamin D hydroxy level 43 wnl    BMP: Date: 1/29/2024 Na 142 K 4.1 Cr 0.7 BUN 23 glucose 81 Ca 9.7 GFR 79  CBC: Date: 1/29/2024 WBC 6.7 Hgb 13.1 hematocrit 40.6 platelets 368  Liver function: Date: 1/29/2024 ALT 14 AST 21 alkaline phos.  131 bilirubin 0.8 albumin 4 protein 6.7        /65   Pulse 72   Temp 36.4 °C (97.6 °F)   Resp 16   Ht 1.6 m (5' 3\")   Wt 46.4 kg (102 lb 6.4 oz)   SpO2 96%   BMI 18.14 kg/m²      Physical Exam  Vitals and nursing note reviewed.   Constitutional:       Appearance: Normal appearance.   HENT:      Head: Normocephalic.      Right Ear: External ear normal.      Left Ear: External ear normal.      Nose: Nose normal.      Mouth/Throat:      Mouth: Mucous membranes are moist.      Pharynx: Oropharynx is clear.   Eyes:      Extraocular Movements: Extraocular movements intact.      Conjunctiva/sclera: Conjunctivae normal.      Pupils: Pupils are equal, round, and reactive to light.   Cardiovascular:      Rate and Rhythm: Normal rate and regular rhythm.      Pulses: Normal pulses.      Heart sounds: Normal heart sounds.   Pulmonary:      Effort: Pulmonary effort is normal.      Breath sounds: Normal breath sounds.   Abdominal:      General: Bowel sounds are normal.      Palpations: Abdomen is soft.   Musculoskeletal:         General: Normal range of motion.      Cervical back: Normal range of motion and neck supple.      Comments: Generalized muscle weakness; impaired gait and mobility   Skin:     General: Skin is warm and dry.   Neurological:      Mental Status: She " is alert. Mental status is at baseline. She is disoriented.      Motor: Weakness present.      Coordination: Coordination abnormal.      Gait: Gait abnormal.   Psychiatric:         Mood and Affect: Affect is inappropriate.         Speech: Speech is tangential.         Cognition and Memory: Cognition is impaired. Memory is impaired.      Comments: Dementia   A x O x 1          Assessment/Plan       Dementia; Alzheimer's disease; Weight loss:   Continue donepezil.  Follow up with in-house psych NP at [facility].  Monitor for agitation.       In house dietician to follow up with patient on weights.  Continue house supplements shakes TID    Monitor weights.         Benign essential HTN; Chronic diastolic heart disease:   Monitor Bps.  Follow up with in house cardiology PRN.  If necessary restart medications for HTN.             Sequela of  CVA/left sided weakness; immobility; generalized muscle weakness:  Patient had a stroke in October, 2020.  Continue ASA and Plavix.  Requires full supportive care.  Continue to use wheelchair.  Fall prevention strategies.             Problem List Items Addressed This Visit          Neuro    Dementia (CMS/HCC) - Primary     Other Visit Diagnoses       Alzheimer disease (CMS/HCC)        Weight loss        Benign essential HTN        Chronic congestive heart failure, unspecified heart failure type (CMS/HCC)        Sequela of cerebrovascular accident        Left-sided weakness        Immobility        Generalized muscle weakness            Sandi Rudolph, APRN-CNP

## 2024-02-15 ENCOUNTER — NURSING HOME VISIT (OUTPATIENT)
Dept: POST ACUTE CARE | Facility: EXTERNAL LOCATION | Age: 89
End: 2024-02-15
Payer: COMMERCIAL

## 2024-02-15 DIAGNOSIS — I69.30 SEQUELA OF CEREBROVASCULAR ACCIDENT: ICD-10-CM

## 2024-02-15 DIAGNOSIS — I10 BENIGN ESSENTIAL HTN: Primary | ICD-10-CM

## 2024-02-15 DIAGNOSIS — R53.1 LEFT-SIDED WEAKNESS: ICD-10-CM

## 2024-02-15 DIAGNOSIS — R63.4 WEIGHT LOSS: ICD-10-CM

## 2024-02-15 DIAGNOSIS — F02.80 ALZHEIMER DISEASE (MULTI): ICD-10-CM

## 2024-02-15 DIAGNOSIS — Z74.09 IMMOBILITY: ICD-10-CM

## 2024-02-15 DIAGNOSIS — G30.9 SEVERE ALZHEIMER'S DEMENTIA WITH OTHER BEHAVIORAL DISTURBANCE, UNSPECIFIED TIMING OF DEMENTIA ONSET (MULTI): ICD-10-CM

## 2024-02-15 DIAGNOSIS — G30.9 ALZHEIMER DISEASE (MULTI): ICD-10-CM

## 2024-02-15 DIAGNOSIS — M62.81 GENERALIZED MUSCLE WEAKNESS: ICD-10-CM

## 2024-02-15 DIAGNOSIS — E55.9 VITAMIN D DEFICIENCY: ICD-10-CM

## 2024-02-15 DIAGNOSIS — F02.C18 SEVERE ALZHEIMER'S DEMENTIA WITH OTHER BEHAVIORAL DISTURBANCE, UNSPECIFIED TIMING OF DEMENTIA ONSET (MULTI): ICD-10-CM

## 2024-02-15 DIAGNOSIS — I50.9 CHRONIC CONGESTIVE HEART FAILURE, UNSPECIFIED HEART FAILURE TYPE (MULTI): ICD-10-CM

## 2024-02-15 PROCEDURE — 99309 SBSQ NF CARE MODERATE MDM 30: CPT | Performed by: NURSE PRACTITIONER

## 2024-02-15 NOTE — LETTER
Patient: Shonda Solano  : 3/14/1934    Encounter Date: 02/15/2024    Name: Shonda Solano    : 3/14/1934    Code Status:  DNR-cc    Chief Complaint:  Follow up on HTN; etc....    HPI   89 year old female patient at [facility] in Long Term Care.       Medical history includes: Difficulty in walking; muscle weakness (generalized); Acquired hammer toes on the right & left foot; Personal history of other infectious and parasitic diseases; disorientation; Alzheimer's disease; unspecified; symbolic dysfunctions; other speech and language deficits following unspecified cerebrovascular disease; mood disorder due to known physiological condition, unspecified; Bipolar disorder; Displaced intertrochanteric fracture of right femur; need for assistance with personal care; chronic kidney disease; primary osteoarthritis; unspecified ankle and foot; polyneuropathy, unspecified; unspecified abnormalities of gait and mobility; other osteoporosis without current pathological fracture.      Chronic comorbidities as follows:       Benign essential HTN; Chronic diastolic heart disease:   Recent /62  Bps wnl, not elevated.    Currently not on any medications for HTN or CHF.  No increased edema or SOB reported.   No weight gain reported, she has been losing weight.  Patient seen today, she is sitting up in her wheelchair.   Calm, cooperative, pleasantly confused.   A x O x 1.   No complaints of headaches or dizziness.  No complaints of chest pain, SOB, headaches or dizziness.  No complaints of headaches or dizziness.           Alzheimer's disease; Dementia; Weight loss:   On donepezil   Also follows up with in-house psych NP at .  No agitation reported.   Weights are trending down.  Recent weights as follows:  23 110.8 #  23 107.4 #  10/6/23 106.2 #  23 105.2 #  23 103 #  24 102.4 #   On house supplements shakes TID         Sequela of  CVA; left sided weakness; generalized muscle  weakness; immobility:  Patient had a stroke in .  On ASA and Plavix.  Requires full supportive care.   Very weak.  In wheelchair.    Vitamin D deficiency:  On vitamin D 3 1000 units PO every day.  Recent vitamin D 25 hydroxy levels:  10/6/2023: Vitamin D 25-hydroxy level 32.58 within normal limits (range )   2023: Vitamin D 25-hydroxy level 33.84  2023 vitamin D hydroxy level 43 wnl  24 vitamin D hydroxy level 41 wnl                           Reviewed  EMR  Reviewed medical, social, surgical and family history.  Reviewed all current medications and performed medication reconciliation.  Performed prescription drug management.  Reviewed vital signs AND lab results  Reviewed [facility] Documentation  Discussed patient with nursing.       ROS: Limited ROS due to mentation; ROS negative unless noted in HPI.     Surgical History  Problems    · History of Femur fracture repair   · History of Tonsillectomy     Family History  Mother    · Family history of   Father    · Family history of      Social History   · Never a smoker  No alcohol  No illicit drugs  Lives in long term care facility              Past Medical History:   Diagnosis Date   • Other malaise 2020    Physical deconditioning   • Personal history of other diseases of the digestive system     History of constipation   • Personal history of other diseases of the nervous system and sense organs 2020    History of polyneuropathy   • Personal history of urinary (tract) infections     History of urinary tract infection   • Unspecified dementia, unspecified severity, without behavioral disturbance, psychotic disturbance, mood disturbance, and anxiety (CMS/HCC) 12/10/2022    Dementia   • Unspecified diastolic (congestive) heart failure (CMS/HCC)     Diastolic heart failure   • Unspecified fracture of right femur, initial encounter for closed fracture (CMS/HCC)     Femur fracture, right              Lab Results   Component Value Date    WBC 9.1 08/12/2023    HGB 12.7 08/12/2023    HCT 41.6 08/12/2023     08/12/2023    ALT 8 08/12/2023    AST 14 08/12/2023     08/12/2023    K 4.1 08/12/2023     08/12/2023    CREATININE 0.81 08/12/2023    BUN 30 (H) 08/12/2023    CO2 21 08/12/2023    TSH 2.92 10/14/2020    INR 1.0 02/20/2022    HGBA1C 5.9 10/15/2020     Outside Labs  CBC: Date: 10/12/22, WBC: 4.8, Hgb: 11.9, Hct: 35.2, PLT: 290   CBC: Date: 7/7/22, WBC: 3.9, Hgb: 12.7, Hct: 38.5, PLT: 307   BMP: Date: 10/12/22, Na: 140, K: 4.3, Cl: 107, CO2: 25, BUN: 25, Cr: 0.6, Glu: 80, Ca: 9   BMP: Date: 7/7/22, Na: 141, K: 4.6, Cl: 105, CO2: 26, BUN: 30, Cr: 0.7, Glu: 113, Ca: 9.1   Hepatic Function Tests: Date: 3/8/22, AST: 15, ALT: 11, Alk Phos: 114, T Bili: 0.4, Albumin: 3.6   Hepatic Function Tests: Date: 3/9/21, AST: 15, ALT: 14, Alk Phos: 101, T Bili: 0.6, Albumin: 3.3   Lipid panel on 10/13/20 as follows: Cholesterol 186; triglyceride 84; HDL 51; ; VLDL 17.    Vitamin D 25 hydroxy level on 10/13/20 22 L    BMP: Date: 1/10/2023 Na 142 K 4.4 Cr 0.7 BUN 27 glucose 77 Ca 9.2 GFR 79  CBC: Date: 1/10/2023 WBC 6.9 Hgb 12.8 hematocrit 38.4 platelets 398  Liver function: Date: 1/10/2023 ALT 11 AST 18 alkaline phos.  112 bilirubin 0.5 albumin 3.7 protein 6.4    BMP: Date: 4/18/2023 Na 145 K 4 Cr 0.7 BUN 25 glucose 85 Ca 8.8 GFR 79  CBC: Date: 4/18/2023 WBC 5.2 Hgb 12.6 hematocrit 38.3 platelets 298  Liver function: Date: 4/18/2023 ALT 6 AST 11 alkaline phos.  94 bilirubin 0.5 albumin 3.4 protein 5.7   4/18/23 vitamin D 25 hydroxy level 19.62 L    BMP: Date: 8/8/2023 Na 142 K 4.9 Cr 0.6 BUN 24 glucose 65 Ca 9.2 GFR 94  CBC: Date: 8/8/2023 WBC 5.4 Hgb 12.8 hematocrit 40.4 platelets 398  Liver function: Date: 8/8/2023 ALT 8 AST 18 alkaline phos.  108 bilirubin 0.6 albumin 3.7 protein 6.4    8/8/23 vitamin D hydroxy level 29.92 L    BMP: Date: 10/6/2023 Na 142 K 4.5 Cr 0.7 BUN 28 glucose 86 Ca 9.8 GFR  "79  CBC: Date: 10/6/2023 WBC 6.2 Hgb 12.7 hematocrit 39.3 platelets 358    10/6/2023: TSH 2.652 within normal limits (range 0.340-5.500)    10/6/2023: Vitamin D 25-hydroxy level 32.58 within normal limits (range )    11/2/2023: Vitamin D 25-hydroxy level 33.84    BMP: Date: 12/4/2023 Na 143 K 4.3 Cr 0.6 BUN 23 glucose 76 Ca 9.8 GFR 94  Liver function: date: 12/4/2023 ALT 10 AST 17 alkaline phos.  112 bilirubin 0.7 albumin 3.8 protein 6.5    12/4/2023 vitamin D 25-hydroxy level 56 wnl    BMP: Date: 12/11/2023 Na 140 K 4.9 Cr 0.6 BUN 21 glucose 55 Ca 9.1 GFR 94  CBC: Date: 12/13/2023 WBC 5.9 Hgb 12.6 hematocrit 39.32 platelets 364  Liver function: Date: 12/11/2023 ALT 8 AST 15 alkaline phos.  97 bilirubin 0.5 albumin 3.3 protein 5.6    12/11/2023 vitamin D hydroxy level 43 wnl    BMP: Date: 1/29/2024 Na 142 K 4.1 Cr 0.7 BUN 23 glucose 81 Ca 9.7 GFR 79  CBC: Date: 1/29/2024 WBC 6.7 Hgb 13.1 hematocrit 40.6 platelets 368  Liver function: Date: 1/29/2024 ALT 14 AST 21 alkaline phos.  131 bilirubin 0.8 albumin 4 protein 6.7        /62   Pulse 64   Temp 36.4 °C (97.6 °F)   Resp 16   Ht 1.6 m (5' 3\")   Wt 46.4 kg (102 lb 6.4 oz)   SpO2 96%   BMI 18.14 kg/m²      Physical Exam  Vitals and nursing note reviewed.   Constitutional:       Appearance: Normal appearance.   HENT:      Head: Normocephalic.      Right Ear: External ear normal.      Left Ear: External ear normal.      Nose: Nose normal.      Mouth/Throat:      Mouth: Mucous membranes are moist.      Pharynx: Oropharynx is clear.   Eyes:      Extraocular Movements: Extraocular movements intact.      Conjunctiva/sclera: Conjunctivae normal.      Pupils: Pupils are equal, round, and reactive to light.   Cardiovascular:      Rate and Rhythm: Normal rate and regular rhythm.      Pulses: Normal pulses.      Heart sounds: Normal heart sounds.   Pulmonary:      Effort: Pulmonary effort is normal.      Breath sounds: Normal breath sounds.   Abdominal:      " General: Bowel sounds are normal.      Palpations: Abdomen is soft.   Musculoskeletal:         General: Normal range of motion.      Cervical back: Normal range of motion and neck supple.      Comments: Generalized muscle weakness; impaired gait and mobility   Skin:     General: Skin is warm and dry.   Neurological:      Mental Status: She is alert. Mental status is at baseline. She is disoriented.      Motor: Weakness present.      Coordination: Coordination abnormal.      Gait: Gait abnormal.   Psychiatric:         Mood and Affect: Affect is inappropriate.         Speech: Speech is tangential.         Cognition and Memory: Cognition is impaired. Memory is impaired.      Comments: Dementia   A x O x 1          Assessment/Plan     CMP, CBC with diff. In March, 2024    Benign essential HTN; Chronic diastolic heart disease:   Monitor Bps.               Monitor weights.               Start medication for HTN or CHF if necessary.                  Alzheimer's disease; Dementia; Weight loss:   Continue donepezil   Follow up with in-house psych NP at [facility].  Monitor for agitation.  Continue house supplements shakes TID         Sequela of  CVA; left sided weakness; generalized muscle weakness; immobility:  Patient had a stroke in October, 2020.  Continue ASA and Plavix.  Requires full supportive care.   Fall prevention strategies.    Vitamin D deficiency:  Continue vitamin D 3 1000 units PO every day.      Monitor vitamin D 25 hydroxy level.    Problem List Items Addressed This Visit          Neuro    Dementia (CMS/HCC)     Other Visit Diagnoses       Benign essential HTN    -  Primary    Chronic congestive heart failure, unspecified heart failure type (CMS/HCC)        Alzheimer disease (CMS/HCC)        Weight loss        Sequela of cerebrovascular accident        Left-sided weakness        Generalized muscle weakness        Immobility        Vitamin D deficiency            SUSIE Jordan-CNP       Electronically  Signed By: SUSIE Jordan-CNP   2/19/24 12:00 AM

## 2024-02-18 VITALS
WEIGHT: 102.4 LBS | SYSTOLIC BLOOD PRESSURE: 115 MMHG | HEART RATE: 64 BPM | BODY MASS INDEX: 18.14 KG/M2 | OXYGEN SATURATION: 96 % | RESPIRATION RATE: 16 BRPM | DIASTOLIC BLOOD PRESSURE: 62 MMHG | TEMPERATURE: 97.6 F | HEIGHT: 63 IN

## 2024-02-19 NOTE — PROGRESS NOTES
Name: Shonda Solano    : 3/14/1934    Code Status:  DNR-cc    Chief Complaint:  Follow up on HTN; etc....    HPI   89 year old female patient at [facility] in Long Term Care.       Medical history includes: Difficulty in walking; muscle weakness (generalized); Acquired hammer toes on the right & left foot; Personal history of other infectious and parasitic diseases; disorientation; Alzheimer's disease; unspecified; symbolic dysfunctions; other speech and language deficits following unspecified cerebrovascular disease; mood disorder due to known physiological condition, unspecified; Bipolar disorder; Displaced intertrochanteric fracture of right femur; need for assistance with personal care; chronic kidney disease; primary osteoarthritis; unspecified ankle and foot; polyneuropathy, unspecified; unspecified abnormalities of gait and mobility; other osteoporosis without current pathological fracture.      Chronic comorbidities as follows:       Benign essential HTN; Chronic diastolic heart disease:   Recent /62  Bps wnl, not elevated.    Currently not on any medications for HTN or CHF.  No increased edema or SOB reported.   No weight gain reported, she has been losing weight.  Patient seen today, she is sitting up in her wheelchair.   Calm, cooperative, pleasantly confused.   A x O x 1.   No complaints of headaches or dizziness.  No complaints of chest pain, SOB, headaches or dizziness.  No complaints of headaches or dizziness.           Alzheimer's disease; Dementia; Weight loss:   On donepezil   Also follows up with in-house psych NP at .  No agitation reported.   Weights are trending down.  Recent weights as follows:  23 110.8 #  23 107.4 #  10/6/23 106.2 #  23 105.2 #  23 103 #  24 102.4 #   On house supplements shakes TID         Sequela of  CVA; left sided weakness; generalized muscle weakness; immobility:  Patient had a stroke in .  On ASA and  Plavix.  Requires full supportive care.   Very weak.  In wheelchair.    Vitamin D deficiency:  On vitamin D 3 1000 units PO every day.  Recent vitamin D 25 hydroxy levels:  10/6/2023: Vitamin D 25-hydroxy level 32.58 within normal limits (range )   2023: Vitamin D 25-hydroxy level 33.84  2023 vitamin D hydroxy level 43 wnl  24 vitamin D hydroxy level 41 wnl                           Reviewed  EMR  Reviewed medical, social, surgical and family history.  Reviewed all current medications and performed medication reconciliation.  Performed prescription drug management.  Reviewed vital signs AND lab results  Reviewed [facility] Documentation  Discussed patient with nursing.       ROS: Limited ROS due to mentation; ROS negative unless noted in HPI.     Surgical History  Problems    · History of Femur fracture repair   · History of Tonsillectomy     Family History  Mother    · Family history of   Father    · Family history of      Social History   · Never a smoker  No alcohol  No illicit drugs  Lives in long term care facility              Past Medical History:   Diagnosis Date    Other malaise 2020    Physical deconditioning    Personal history of other diseases of the digestive system     History of constipation    Personal history of other diseases of the nervous system and sense organs 2020    History of polyneuropathy    Personal history of urinary (tract) infections     History of urinary tract infection    Unspecified dementia, unspecified severity, without behavioral disturbance, psychotic disturbance, mood disturbance, and anxiety (CMS/HCC) 12/10/2022    Dementia    Unspecified diastolic (congestive) heart failure (CMS/HCC)     Diastolic heart failure    Unspecified fracture of right femur, initial encounter for closed fracture (CMS/HCC)     Femur fracture, right             Lab Results   Component Value Date    WBC 9.1 2023    HGB 12.7 2023     HCT 41.6 08/12/2023     08/12/2023    ALT 8 08/12/2023    AST 14 08/12/2023     08/12/2023    K 4.1 08/12/2023     08/12/2023    CREATININE 0.81 08/12/2023    BUN 30 (H) 08/12/2023    CO2 21 08/12/2023    TSH 2.92 10/14/2020    INR 1.0 02/20/2022    HGBA1C 5.9 10/15/2020     Outside Labs  CBC: Date: 10/12/22, WBC: 4.8, Hgb: 11.9, Hct: 35.2, PLT: 290   CBC: Date: 7/7/22, WBC: 3.9, Hgb: 12.7, Hct: 38.5, PLT: 307   BMP: Date: 10/12/22, Na: 140, K: 4.3, Cl: 107, CO2: 25, BUN: 25, Cr: 0.6, Glu: 80, Ca: 9   BMP: Date: 7/7/22, Na: 141, K: 4.6, Cl: 105, CO2: 26, BUN: 30, Cr: 0.7, Glu: 113, Ca: 9.1   Hepatic Function Tests: Date: 3/8/22, AST: 15, ALT: 11, Alk Phos: 114, T Bili: 0.4, Albumin: 3.6   Hepatic Function Tests: Date: 3/9/21, AST: 15, ALT: 14, Alk Phos: 101, T Bili: 0.6, Albumin: 3.3   Lipid panel on 10/13/20 as follows: Cholesterol 186; triglyceride 84; HDL 51; ; VLDL 17.    Vitamin D 25 hydroxy level on 10/13/20 22 L    BMP: Date: 1/10/2023 Na 142 K 4.4 Cr 0.7 BUN 27 glucose 77 Ca 9.2 GFR 79  CBC: Date: 1/10/2023 WBC 6.9 Hgb 12.8 hematocrit 38.4 platelets 398  Liver function: Date: 1/10/2023 ALT 11 AST 18 alkaline phos.  112 bilirubin 0.5 albumin 3.7 protein 6.4    BMP: Date: 4/18/2023 Na 145 K 4 Cr 0.7 BUN 25 glucose 85 Ca 8.8 GFR 79  CBC: Date: 4/18/2023 WBC 5.2 Hgb 12.6 hematocrit 38.3 platelets 298  Liver function: Date: 4/18/2023 ALT 6 AST 11 alkaline phos.  94 bilirubin 0.5 albumin 3.4 protein 5.7   4/18/23 vitamin D 25 hydroxy level 19.62 L    BMP: Date: 8/8/2023 Na 142 K 4.9 Cr 0.6 BUN 24 glucose 65 Ca 9.2 GFR 94  CBC: Date: 8/8/2023 WBC 5.4 Hgb 12.8 hematocrit 40.4 platelets 398  Liver function: Date: 8/8/2023 ALT 8 AST 18 alkaline phos.  108 bilirubin 0.6 albumin 3.7 protein 6.4    8/8/23 vitamin D hydroxy level 29.92 L    BMP: Date: 10/6/2023 Na 142 K 4.5 Cr 0.7 BUN 28 glucose 86 Ca 9.8 GFR 79  CBC: Date: 10/6/2023 WBC 6.2 Hgb 12.7 hematocrit 39.3 platelets 358    10/6/2023:  "TSH 2.652 within normal limits (range 0.340-5.500)    10/6/2023: Vitamin D 25-hydroxy level 32.58 within normal limits (range )    11/2/2023: Vitamin D 25-hydroxy level 33.84    BMP: Date: 12/4/2023 Na 143 K 4.3 Cr 0.6 BUN 23 glucose 76 Ca 9.8 GFR 94  Liver function: date: 12/4/2023 ALT 10 AST 17 alkaline phos.  112 bilirubin 0.7 albumin 3.8 protein 6.5    12/4/2023 vitamin D 25-hydroxy level 56 wnl    BMP: Date: 12/11/2023 Na 140 K 4.9 Cr 0.6 BUN 21 glucose 55 Ca 9.1 GFR 94  CBC: Date: 12/13/2023 WBC 5.9 Hgb 12.6 hematocrit 39.32 platelets 364  Liver function: Date: 12/11/2023 ALT 8 AST 15 alkaline phos.  97 bilirubin 0.5 albumin 3.3 protein 5.6    12/11/2023 vitamin D hydroxy level 43 wnl    BMP: Date: 1/29/2024 Na 142 K 4.1 Cr 0.7 BUN 23 glucose 81 Ca 9.7 GFR 79  CBC: Date: 1/29/2024 WBC 6.7 Hgb 13.1 hematocrit 40.6 platelets 368  Liver function: Date: 1/29/2024 ALT 14 AST 21 alkaline phos.  131 bilirubin 0.8 albumin 4 protein 6.7        /62   Pulse 64   Temp 36.4 °C (97.6 °F)   Resp 16   Ht 1.6 m (5' 3\")   Wt 46.4 kg (102 lb 6.4 oz)   SpO2 96%   BMI 18.14 kg/m²      Physical Exam  Vitals and nursing note reviewed.   Constitutional:       Appearance: Normal appearance.   HENT:      Head: Normocephalic.      Right Ear: External ear normal.      Left Ear: External ear normal.      Nose: Nose normal.      Mouth/Throat:      Mouth: Mucous membranes are moist.      Pharynx: Oropharynx is clear.   Eyes:      Extraocular Movements: Extraocular movements intact.      Conjunctiva/sclera: Conjunctivae normal.      Pupils: Pupils are equal, round, and reactive to light.   Cardiovascular:      Rate and Rhythm: Normal rate and regular rhythm.      Pulses: Normal pulses.      Heart sounds: Normal heart sounds.   Pulmonary:      Effort: Pulmonary effort is normal.      Breath sounds: Normal breath sounds.   Abdominal:      General: Bowel sounds are normal.      Palpations: Abdomen is soft.   Musculoskeletal:  "        General: Normal range of motion.      Cervical back: Normal range of motion and neck supple.      Comments: Generalized muscle weakness; impaired gait and mobility   Skin:     General: Skin is warm and dry.   Neurological:      Mental Status: She is alert. Mental status is at baseline. She is disoriented.      Motor: Weakness present.      Coordination: Coordination abnormal.      Gait: Gait abnormal.   Psychiatric:         Mood and Affect: Affect is inappropriate.         Speech: Speech is tangential.         Cognition and Memory: Cognition is impaired. Memory is impaired.      Comments: Dementia   A x O x 1          Assessment/Plan      CMP, CBC with diff. In March, 2024    Benign essential HTN; Chronic diastolic heart disease:   Monitor Bps.               Monitor weights.               Start medication for HTN or CHF if necessary.                  Alzheimer's disease; Dementia; Weight loss:   Continue donepezil   Follow up with in-house psych NP at [facility].  Monitor for agitation.  Continue house supplements shakes TID         Sequela of  CVA; left sided weakness; generalized muscle weakness; immobility:  Patient had a stroke in October, 2020.  Continue ASA and Plavix.  Requires full supportive care.   Fall prevention strategies.    Vitamin D deficiency:  Continue vitamin D 3 1000 units PO every day.      Monitor vitamin D 25 hydroxy level.    Problem List Items Addressed This Visit          Neuro    Dementia (CMS/HCC)     Other Visit Diagnoses       Benign essential HTN    -  Primary    Chronic congestive heart failure, unspecified heart failure type (CMS/HCC)        Alzheimer disease (CMS/HCC)        Weight loss        Sequela of cerebrovascular accident        Left-sided weakness        Generalized muscle weakness        Immobility        Vitamin D deficiency            Sandi Rudolph, APRN-CNP

## 2024-02-23 ENCOUNTER — NURSING HOME VISIT (OUTPATIENT)
Dept: POST ACUTE CARE | Facility: EXTERNAL LOCATION | Age: 89
End: 2024-02-23
Payer: COMMERCIAL

## 2024-02-23 DIAGNOSIS — I10 BENIGN ESSENTIAL HTN: Primary | ICD-10-CM

## 2024-02-23 DIAGNOSIS — F31.9 BIPOLAR DEPRESSION (MULTI): ICD-10-CM

## 2024-02-23 DIAGNOSIS — F02.C18 SEVERE ALZHEIMER'S DEMENTIA WITH OTHER BEHAVIORAL DISTURBANCE, UNSPECIFIED TIMING OF DEMENTIA ONSET (MULTI): ICD-10-CM

## 2024-02-23 DIAGNOSIS — Z86.73 HISTORY OF CVA (CEREBROVASCULAR ACCIDENT): ICD-10-CM

## 2024-02-23 DIAGNOSIS — G30.9 SEVERE ALZHEIMER'S DEMENTIA WITH OTHER BEHAVIORAL DISTURBANCE, UNSPECIFIED TIMING OF DEMENTIA ONSET (MULTI): ICD-10-CM

## 2024-02-23 PROCEDURE — 99308 SBSQ NF CARE LOW MDM 20: CPT | Performed by: INTERNAL MEDICINE

## 2024-02-23 NOTE — LETTER
Patient: Shonda Solano  : 3/14/1934    Encounter Date: 2024    Ms Solano is a 89-year-old woman with history of Alzheimer's dementia,CVA().  She is a long-term resident at [facility].  No recent falls. lying in her room today. No concerns by staff.  ROS: no changes in weight  Chest- no pain or palpitations  Lungs- no cough or SOB  Abd- no pain, no N/V/D  Ext- no swelling  Psych- memory impaired  Vital signs: /62  Temperature 97.6  Pulse 64  Weight 102.4 pounds  Gen- NAD, pleasantly confused  Neck- supple, no jvd  Lungs: Lungs clear toauscultation  Cardio: S1-S2 normal  ABD: Soft nontender bowel sounds present  Musc/Skel: No deformities or edema  Extremities: No edema  Neuro- alert, no gross deformity  Psych: Memory impaired  Assessment and Plan:  CVA-stable  Continue with aspirin, Plavix  continue with fall and safety precautions  Alzheimer's dementia-stable  Continue with Aricept  Depression- followed by Psych  Continue with supportive care      Electronically Signed By: Indiana Wen MD   24  2:45 PM

## 2024-02-29 NOTE — PROGRESS NOTES
Ms Sloano is a 89-year-old woman with history of Alzheimer's dementia,CVA(2020).  She is a long-term resident at [facility].  No recent falls. lying in her room today. No concerns by staff.  ROS: no changes in weight  Chest- no pain or palpitations  Lungs- no cough or SOB  Abd- no pain, no N/V/D  Ext- no swelling  Psych- memory impaired  Vital signs: /62  Temperature 97.6  Pulse 64  Weight 102.4 pounds  Gen- NAD, pleasantly confused  Neck- supple, no jvd  Lungs: Lungs clear toauscultation  Cardio: S1-S2 normal  ABD: Soft nontender bowel sounds present  Musc/Skel: No deformities or edema  Extremities: No edema  Neuro- alert, no gross deformity  Psych: Memory impaired  Assessment and Plan:  CVA-stable  Continue with aspirin, Plavix  continue with fall and safety precautions  Alzheimer's dementia-stable  Continue with Aricept  Depression- followed by Psych  Continue with supportive care

## 2024-03-19 ENCOUNTER — NURSING HOME VISIT (OUTPATIENT)
Dept: POST ACUTE CARE | Facility: EXTERNAL LOCATION | Age: 89
End: 2024-03-19
Payer: COMMERCIAL

## 2024-03-19 DIAGNOSIS — G30.9 SEVERE ALZHEIMER'S DEMENTIA WITH OTHER BEHAVIORAL DISTURBANCE, UNSPECIFIED TIMING OF DEMENTIA ONSET (MULTI): Primary | ICD-10-CM

## 2024-03-19 DIAGNOSIS — G30.9 ALZHEIMER DISEASE (MULTI): ICD-10-CM

## 2024-03-19 DIAGNOSIS — R63.4 WEIGHT LOSS: ICD-10-CM

## 2024-03-19 DIAGNOSIS — E55.9 VITAMIN D DEFICIENCY: ICD-10-CM

## 2024-03-19 DIAGNOSIS — I69.30 SEQUELA OF CEREBROVASCULAR ACCIDENT: ICD-10-CM

## 2024-03-19 DIAGNOSIS — R53.1 LEFT-SIDED WEAKNESS: ICD-10-CM

## 2024-03-19 DIAGNOSIS — Z74.09 IMMOBILITY: ICD-10-CM

## 2024-03-19 DIAGNOSIS — M62.81 GENERALIZED MUSCLE WEAKNESS: ICD-10-CM

## 2024-03-19 DIAGNOSIS — F02.80 ALZHEIMER DISEASE (MULTI): ICD-10-CM

## 2024-03-19 DIAGNOSIS — F02.C18 SEVERE ALZHEIMER'S DEMENTIA WITH OTHER BEHAVIORAL DISTURBANCE, UNSPECIFIED TIMING OF DEMENTIA ONSET (MULTI): Primary | ICD-10-CM

## 2024-03-19 PROCEDURE — 99308 SBSQ NF CARE LOW MDM 20: CPT | Performed by: NURSE PRACTITIONER

## 2024-03-19 NOTE — LETTER
Patient: Shonda Solano  : 3/14/1934    Encounter Date: 2024    Name: Shonda Solano    : 3/14/1934    Code Status:  DNR-cc    Chief Complaint:  Follow up on Alzheimer's dementia; etc....    HPI   89 year old female patient at [facility] in Long Term Care.       Medical history includes: Difficulty in walking; muscle weakness (generalized); Acquired hammer toes on the right & left foot; Personal history of other infectious and parasitic diseases; disorientation; Alzheimer's disease; unspecified; symbolic dysfunctions; other speech and language deficits following unspecified cerebrovascular disease; mood disorder due to known physiological condition, unspecified; Bipolar disorder; Displaced intertrochanteric fracture of right femur; need for assistance with personal care; chronic kidney disease; primary osteoarthritis; unspecified ankle and foot; polyneuropathy, unspecified; unspecified abnormalities of gait and mobility; other osteoporosis without current pathological fracture.      Chronic comorbidities as follows:     Alzheimer's disease; Dementia; Weight loss:   On donepezil.  Also follows up with in-house psych NP at .  No agitation reported.   Weights are trending down.  Recent weights as follows:  23 110.8 #  23 107.4 #  10/6/23 106.2 #  23 105.2 #  23 103 #  24 102.4 #  24 102.4 #  3/6/24 100 #   On house supplements shakes TID   Patient seen today, she is sitting up in her wheelchair.   Calm, cooperative, pleasantly confused.   A x O x 1.   No complaints of headaches or dizziness.  No complaints of chest pain, SOB, headaches or dizziness.  No complaints of headaches or dizziness.    Vitamin D deficiency:  On vitamin D 3 1000 units PO every day.  Recent vitamin D 25 hydroxy levels:  10/6/2023: Vitamin D 25-hydroxy level 32.58 within normal limits (range )   2023: Vitamin D 25-hydroxy level 33.84  2023 vitamin D hydroxy level 43  wnl  24 vitamin D hydroxy level 41 wnl  3/11/2024 vitamin D 25-hydroxy level: 35         Sequela of  CVA; left sided weakness; generalized muscle weakness; immobility:  Patient had a stroke in .  On ASA and Plavix.  Requires full supportive care.   Very weak.  In wheelchair.                             Reviewed  EMR  Reviewed medical, social, surgical and family history.  Reviewed all current medications and performed medication reconciliation.  Performed prescription drug management.  Reviewed vital signs AND lab results  Reviewed [facility] Care Documentation  Discussed patient with nursing.       ROS: Limited ROS due to mentation; ROS negative unless noted in HPI.     Surgical History  Problems    · History of Femur fracture repair   · History of Tonsillectomy     Family History  Mother    · Family history of   Father    · Family history of      Social History   · Never a smoker  No alcohol  No illicit drugs  Lives in long term care facility              Past Medical History:   Diagnosis Date   • Other malaise 2020    Physical deconditioning   • Personal history of other diseases of the digestive system     History of constipation   • Personal history of other diseases of the nervous system and sense organs 2020    History of polyneuropathy   • Personal history of urinary (tract) infections     History of urinary tract infection   • Unspecified dementia, unspecified severity, without behavioral disturbance, psychotic disturbance, mood disturbance, and anxiety (CMS/[facility]) 12/10/2022    Dementia   • Unspecified diastolic (congestive) heart failure (CMS/[facility])     Diastolic heart failure   • Unspecified fracture of right femur, initial encounter for closed fracture (CMS/[facility])     Femur fracture, right             Lab Results   Component Value Date    WBC 9.1 2023    HGB 12.7 2023    HCT 41.6 2023     2023    ALT 8 2023    AST 14  08/12/2023     08/12/2023    K 4.1 08/12/2023     08/12/2023    CREATININE 0.81 08/12/2023    BUN 30 (H) 08/12/2023    CO2 21 08/12/2023    TSH 2.92 10/14/2020    INR 1.0 02/20/2022    HGBA1C 5.9 10/15/2020     Outside Labs  CBC: Date: 10/12/22, WBC: 4.8, Hgb: 11.9, Hct: 35.2, PLT: 290   CBC: Date: 7/7/22, WBC: 3.9, Hgb: 12.7, Hct: 38.5, PLT: 307   BMP: Date: 10/12/22, Na: 140, K: 4.3, Cl: 107, CO2: 25, BUN: 25, Cr: 0.6, Glu: 80, Ca: 9   BMP: Date: 7/7/22, Na: 141, K: 4.6, Cl: 105, CO2: 26, BUN: 30, Cr: 0.7, Glu: 113, Ca: 9.1   Hepatic Function Tests: Date: 3/8/22, AST: 15, ALT: 11, Alk Phos: 114, T Bili: 0.4, Albumin: 3.6   Hepatic Function Tests: Date: 3/9/21, AST: 15, ALT: 14, Alk Phos: 101, T Bili: 0.6, Albumin: 3.3   Lipid panel on 10/13/20 as follows: Cholesterol 186; triglyceride 84; HDL 51; ; VLDL 17.    Vitamin D 25 hydroxy level on 10/13/20 22 L    BMP: Date: 1/10/2023 Na 142 K 4.4 Cr 0.7 BUN 27 glucose 77 Ca 9.2 GFR 79  CBC: Date: 1/10/2023 WBC 6.9 Hgb 12.8 hematocrit 38.4 platelets 398  Liver function: Date: 1/10/2023 ALT 11 AST 18 alkaline phos.  112 bilirubin 0.5 albumin 3.7 protein 6.4    BMP: Date: 4/18/2023 Na 145 K 4 Cr 0.7 BUN 25 glucose 85 Ca 8.8 GFR 79  CBC: Date: 4/18/2023 WBC 5.2 Hgb 12.6 hematocrit 38.3 platelets 298  Liver function: Date: 4/18/2023 ALT 6 AST 11 alkaline phos.  94 bilirubin 0.5 albumin 3.4 protein 5.7   4/18/23 vitamin D 25 hydroxy level 19.62 L    BMP: Date: 8/8/2023 Na 142 K 4.9 Cr 0.6 BUN 24 glucose 65 Ca 9.2 GFR 94  CBC: Date: 8/8/2023 WBC 5.4 Hgb 12.8 hematocrit 40.4 platelets 398  Liver function: Date: 8/8/2023 ALT 8 AST 18 alkaline phos.  108 bilirubin 0.6 albumin 3.7 protein 6.4    8/8/23 vitamin D hydroxy level 29.92 L    BMP: Date: 10/6/2023 Na 142 K 4.5 Cr 0.7 BUN 28 glucose 86 Ca 9.8 GFR 79  CBC: Date: 10/6/2023 WBC 6.2 Hgb 12.7 hematocrit 39.3 platelets 358    10/6/2023: TSH 2.652 within normal limits (range 0.340-5.500)    10/6/2023: Vitamin D  "25-hydroxy level 32.58 within normal limits (range )    11/2/2023: Vitamin D 25-hydroxy level 33.84    BMP: Date: 12/4/2023 Na 143 K 4.3 Cr 0.6 BUN 23 glucose 76 Ca 9.8 GFR 94  Liver function: date: 12/4/2023 ALT 10 AST 17 alkaline phos.  112 bilirubin 0.7 albumin 3.8 protein 6.5    12/4/2023 vitamin D 25-hydroxy level 56 wnl    BMP: Date: 12/11/2023 Na 140 K 4.9 Cr 0.6 BUN 21 glucose 55 Ca 9.1 GFR 94  CBC: Date: 12/13/2023 WBC 5.9 Hgb 12.6 hematocrit 39.32 platelets 364  Liver function: Date: 12/11/2023 ALT 8 AST 15 alkaline phos.  97 bilirubin 0.5 albumin 3.3 protein 5.6    12/11/2023 vitamin D hydroxy level 43 wnl    BMP: Date: 1/29/2024 Na 142 K 4.1 Cr 0.7 BUN 23 glucose 81 Ca 9.7 GFR 79  CBC: Date: 1/29/2024 WBC 6.7 Hgb 13.1 hematocrit 40.6 platelets 368  Liver function: Date: 1/29/2024 ALT 14 AST 21 alkaline phos.  131 bilirubin 0.8 albumin 4 protein 6.7    BMP: Date: 3/11/2024 Na 140 K 4.1 Cr 0.6 BUN 23 glucose 129 Ca 9.4 GFR 94  CBC: Date: 3/11/2024 WBC 6.3 Hgb 12.7 hematocrit 39 platelets 381  Liver function: Date: 3/11/2024 ALT 9 AST 14 alkaline phos.  119 bilirubin 0.5 albumin 3.5 protein 6.1    3/11/2024 vitamin D 25-hydroxy level: 35 ( )          /56   Pulse 76   Temp 36.6 °C (97.9 °F)   Resp 16   Ht 1.6 m (5' 3\")   Wt 45.4 kg (100 lb)   SpO2 95%   BMI 17.71 kg/m²      Physical Exam  Vitals and nursing note reviewed.   Constitutional:       Appearance: Normal appearance.   HENT:      Head: Normocephalic.      Right Ear: External ear normal.      Left Ear: External ear normal.      Nose: Nose normal.      Mouth/Throat:      Mouth: Mucous membranes are moist.      Pharynx: Oropharynx is clear.   Eyes:      Extraocular Movements: Extraocular movements intact.      Conjunctiva/sclera: Conjunctivae normal.      Pupils: Pupils are equal, round, and reactive to light.   Cardiovascular:      Rate and Rhythm: Normal rate and regular rhythm.      Pulses: Normal pulses.      Heart sounds: " Normal heart sounds.   Pulmonary:      Effort: Pulmonary effort is normal.      Breath sounds: Normal breath sounds.   Abdominal:      General: Bowel sounds are normal.      Palpations: Abdomen is soft.   Musculoskeletal:         General: Normal range of motion.      Cervical back: Normal range of motion and neck supple.      Comments: Generalized muscle weakness; impaired gait and mobility   Skin:     General: Skin is warm and dry.   Neurological:      Mental Status: She is alert. Mental status is at baseline. She is disoriented.      Motor: Weakness present.      Coordination: Coordination abnormal.      Gait: Gait abnormal.   Psychiatric:         Mood and Affect: Affect is inappropriate.         Speech: Speech is tangential.         Cognition and Memory: Cognition is impaired. Memory is impaired.      Comments: Dementia   A x O x 1          Assessment/Plan            Alzheimer's disease; Dementia; Weight loss:   Continue donepezil   Follow up with in-house psych NP at [facility].  Monitor for agitation.  Continue house supplements shakes TID     Vitamin D deficiency:  Continue vitamin D 3 1000 units PO every day.      Monitor vitamin D 25 hydroxy level.        Sequela of  CVA; left sided weakness; generalized muscle weakness; immobility:  Patient had a stroke in October, 2020.  Continue ASA and Plavix.  Requires full supportive care.   Fall prevention strategies.      Problem List Items Addressed This Visit          Neuro    Dementia (CMS/HCC) - Primary     Other Visit Diagnoses       Alzheimer disease (CMS/[facility])        Weight loss        Vitamin D deficiency        Sequela of cerebrovascular accident        Left-sided weakness        Generalized muscle weakness        Immobility            YIMI Jordan       Electronically Signed By: YIMI Jordan   3/23/24  4:48 PM

## 2024-03-23 VITALS
WEIGHT: 100 LBS | DIASTOLIC BLOOD PRESSURE: 56 MMHG | RESPIRATION RATE: 16 BRPM | SYSTOLIC BLOOD PRESSURE: 105 MMHG | HEART RATE: 76 BPM | OXYGEN SATURATION: 95 % | HEIGHT: 63 IN | TEMPERATURE: 97.9 F | BODY MASS INDEX: 17.72 KG/M2

## 2024-03-23 NOTE — PROGRESS NOTES
Name: Shonda Solano    : 3/14/1934    Code Status:  DNR-cc    Chief Complaint:  Follow up on Alzheimer's dementia; etc....    HPI   89 year old female patient at [facility] in Long Term Care.       Medical history includes: Difficulty in walking; muscle weakness (generalized); Acquired hammer toes on the right & left foot; Personal history of other infectious and parasitic diseases; disorientation; Alzheimer's disease; unspecified; symbolic dysfunctions; other speech and language deficits following unspecified cerebrovascular disease; mood disorder due to known physiological condition, unspecified; Bipolar disorder; Displaced intertrochanteric fracture of right femur; need for assistance with personal care; chronic kidney disease; primary osteoarthritis; unspecified ankle and foot; polyneuropathy, unspecified; unspecified abnormalities of gait and mobility; other osteoporosis without current pathological fracture.      Chronic comorbidities as follows:     Alzheimer's disease; Dementia; Weight loss:   On donepezil.  Also follows up with in-house psych NP at .  No agitation reported.   Weights are trending down.  Recent weights as follows:  23 110.8 #  23 107.4 #  10/6/23 106.2 #  23 105.2 #  23 103 #  24 102.4 #  24 102.4 #  3/6/24 100 #   On house supplements shakes TID   Patient seen today, she is sitting up in her wheelchair.   Calm, cooperative, pleasantly confused.   A x O x 1.   No complaints of headaches or dizziness.  No complaints of chest pain, SOB, headaches or dizziness.  No complaints of headaches or dizziness.    Vitamin D deficiency:  On vitamin D 3 1000 units PO every day.  Recent vitamin D 25 hydroxy levels:  10/6/2023: Vitamin D 25-hydroxy level 32.58 within normal limits (range )   2023: Vitamin D 25-hydroxy level 33.84  2023 vitamin D hydroxy level 43 wnl  24 vitamin D hydroxy level 41 wnl  3/11/2024 vitamin D 25-hydroxy  level: 35         Sequela of  CVA; left sided weakness; generalized muscle weakness; immobility:  Patient had a stroke in .  On ASA and Plavix.  Requires full supportive care.   Very weak.  In wheelchair.                             Reviewed  EMR  Reviewed medical, social, surgical and family history.  Reviewed all current medications and performed medication reconciliation.  Performed prescription drug management.  Reviewed vital signs AND lab results  Reviewed [facility] Care Documentation  Discussed patient with nursing.       ROS: Limited ROS due to mentation; ROS negative unless noted in HPI.     Surgical History  Problems    · History of Femur fracture repair   · History of Tonsillectomy     Family History  Mother    · Family history of   Father    · Family history of      Social History   · Never a smoker  No alcohol  No illicit drugs  Lives in long term care facility              Past Medical History:   Diagnosis Date    Other malaise 2020    Physical deconditioning    Personal history of other diseases of the digestive system     History of constipation    Personal history of other diseases of the nervous system and sense organs 2020    History of polyneuropathy    Personal history of urinary (tract) infections     History of urinary tract infection    Unspecified dementia, unspecified severity, without behavioral disturbance, psychotic disturbance, mood disturbance, and anxiety (CMS/HCC) 12/10/2022    Dementia    Unspecified diastolic (congestive) heart failure (CMS/HCC)     Diastolic heart failure    Unspecified fracture of right femur, initial encounter for closed fracture (CMS/HCC)     Femur fracture, right             Lab Results   Component Value Date    WBC 9.1 2023    HGB 12.7 2023    HCT 41.6 2023     2023    ALT 8 2023    AST 14 2023     2023    K 4.1 2023     2023    CREATININE  0.81 08/12/2023    BUN 30 (H) 08/12/2023    CO2 21 08/12/2023    TSH 2.92 10/14/2020    INR 1.0 02/20/2022    HGBA1C 5.9 10/15/2020     Outside Labs  CBC: Date: 10/12/22, WBC: 4.8, Hgb: 11.9, Hct: 35.2, PLT: 290   CBC: Date: 7/7/22, WBC: 3.9, Hgb: 12.7, Hct: 38.5, PLT: 307   BMP: Date: 10/12/22, Na: 140, K: 4.3, Cl: 107, CO2: 25, BUN: 25, Cr: 0.6, Glu: 80, Ca: 9   BMP: Date: 7/7/22, Na: 141, K: 4.6, Cl: 105, CO2: 26, BUN: 30, Cr: 0.7, Glu: 113, Ca: 9.1   Hepatic Function Tests: Date: 3/8/22, AST: 15, ALT: 11, Alk Phos: 114, T Bili: 0.4, Albumin: 3.6   Hepatic Function Tests: Date: 3/9/21, AST: 15, ALT: 14, Alk Phos: 101, T Bili: 0.6, Albumin: 3.3   Lipid panel on 10/13/20 as follows: Cholesterol 186; triglyceride 84; HDL 51; ; VLDL 17.    Vitamin D 25 hydroxy level on 10/13/20 22 L    BMP: Date: 1/10/2023 Na 142 K 4.4 Cr 0.7 BUN 27 glucose 77 Ca 9.2 GFR 79  CBC: Date: 1/10/2023 WBC 6.9 Hgb 12.8 hematocrit 38.4 platelets 398  Liver function: Date: 1/10/2023 ALT 11 AST 18 alkaline phos.  112 bilirubin 0.5 albumin 3.7 protein 6.4    BMP: Date: 4/18/2023 Na 145 K 4 Cr 0.7 BUN 25 glucose 85 Ca 8.8 GFR 79  CBC: Date: 4/18/2023 WBC 5.2 Hgb 12.6 hematocrit 38.3 platelets 298  Liver function: Date: 4/18/2023 ALT 6 AST 11 alkaline phos.  94 bilirubin 0.5 albumin 3.4 protein 5.7   4/18/23 vitamin D 25 hydroxy level 19.62 L    BMP: Date: 8/8/2023 Na 142 K 4.9 Cr 0.6 BUN 24 glucose 65 Ca 9.2 GFR 94  CBC: Date: 8/8/2023 WBC 5.4 Hgb 12.8 hematocrit 40.4 platelets 398  Liver function: Date: 8/8/2023 ALT 8 AST 18 alkaline phos.  108 bilirubin 0.6 albumin 3.7 protein 6.4    8/8/23 vitamin D hydroxy level 29.92 L    BMP: Date: 10/6/2023 Na 142 K 4.5 Cr 0.7 BUN 28 glucose 86 Ca 9.8 GFR 79  CBC: Date: 10/6/2023 WBC 6.2 Hgb 12.7 hematocrit 39.3 platelets 358    10/6/2023: TSH 2.652 within normal limits (range 0.340-5.500)    10/6/2023: Vitamin D 25-hydroxy level 32.58 within normal limits (range )    11/2/2023: Vitamin D  "25-hydroxy level 33.84    BMP: Date: 12/4/2023 Na 143 K 4.3 Cr 0.6 BUN 23 glucose 76 Ca 9.8 GFR 94  Liver function: date: 12/4/2023 ALT 10 AST 17 alkaline phos.  112 bilirubin 0.7 albumin 3.8 protein 6.5    12/4/2023 vitamin D 25-hydroxy level 56 wnl    BMP: Date: 12/11/2023 Na 140 K 4.9 Cr 0.6 BUN 21 glucose 55 Ca 9.1 GFR 94  CBC: Date: 12/13/2023 WBC 5.9 Hgb 12.6 hematocrit 39.32 platelets 364  Liver function: Date: 12/11/2023 ALT 8 AST 15 alkaline phos.  97 bilirubin 0.5 albumin 3.3 protein 5.6    12/11/2023 vitamin D hydroxy level 43 wnl    BMP: Date: 1/29/2024 Na 142 K 4.1 Cr 0.7 BUN 23 glucose 81 Ca 9.7 GFR 79  CBC: Date: 1/29/2024 WBC 6.7 Hgb 13.1 hematocrit 40.6 platelets 368  Liver function: Date: 1/29/2024 ALT 14 AST 21 alkaline phos.  131 bilirubin 0.8 albumin 4 protein 6.7    BMP: Date: 3/11/2024 Na 140 K 4.1 Cr 0.6 BUN 23 glucose 129 Ca 9.4 GFR 94  CBC: Date: 3/11/2024 WBC 6.3 Hgb 12.7 hematocrit 39 platelets 381  Liver function: Date: 3/11/2024 ALT 9 AST 14 alkaline phos.  119 bilirubin 0.5 albumin 3.5 protein 6.1    3/11/2024 vitamin D 25-hydroxy level: 35 ( )          /56   Pulse 76   Temp 36.6 °C (97.9 °F)   Resp 16   Ht 1.6 m (5' 3\")   Wt 45.4 kg (100 lb)   SpO2 95%   BMI 17.71 kg/m²      Physical Exam  Vitals and nursing note reviewed.   Constitutional:       Appearance: Normal appearance.   HENT:      Head: Normocephalic.      Right Ear: External ear normal.      Left Ear: External ear normal.      Nose: Nose normal.      Mouth/Throat:      Mouth: Mucous membranes are moist.      Pharynx: Oropharynx is clear.   Eyes:      Extraocular Movements: Extraocular movements intact.      Conjunctiva/sclera: Conjunctivae normal.      Pupils: Pupils are equal, round, and reactive to light.   Cardiovascular:      Rate and Rhythm: Normal rate and regular rhythm.      Pulses: Normal pulses.      Heart sounds: Normal heart sounds.   Pulmonary:      Effort: Pulmonary effort is normal.      " Breath sounds: Normal breath sounds.   Abdominal:      General: Bowel sounds are normal.      Palpations: Abdomen is soft.   Musculoskeletal:         General: Normal range of motion.      Cervical back: Normal range of motion and neck supple.      Comments: Generalized muscle weakness; impaired gait and mobility   Skin:     General: Skin is warm and dry.   Neurological:      Mental Status: She is alert. Mental status is at baseline. She is disoriented.      Motor: Weakness present.      Coordination: Coordination abnormal.      Gait: Gait abnormal.   Psychiatric:         Mood and Affect: Affect is inappropriate.         Speech: Speech is tangential.         Cognition and Memory: Cognition is impaired. Memory is impaired.      Comments: Dementia   A x O x 1          Assessment/Plan             Alzheimer's disease; Dementia; Weight loss:   Continue donepezil   Follow up with in-house psych NP at [facility].  Monitor for agitation.  Continue house supplements shakes TID     Vitamin D deficiency:  Continue vitamin D 3 1000 units PO every day.      Monitor vitamin D 25 hydroxy level.        Sequela of  CVA; left sided weakness; generalized muscle weakness; immobility:  Patient had a stroke in October, 2020.  Continue ASA and Plavix.  Requires full supportive care.   Fall prevention strategies.      Problem List Items Addressed This Visit          Neuro    Dementia (CMS/HCC) - Primary     Other Visit Diagnoses       Alzheimer disease (CMS/[facility])        Weight loss        Vitamin D deficiency        Sequela of cerebrovascular accident        Left-sided weakness        Generalized muscle weakness        Immobility            SUSIE Jordan-CNP

## 2024-04-30 ENCOUNTER — NURSING HOME VISIT (OUTPATIENT)
Dept: POST ACUTE CARE | Facility: EXTERNAL LOCATION | Age: 89
End: 2024-04-30
Payer: COMMERCIAL

## 2024-04-30 DIAGNOSIS — R68.89 FLUCTUATION OF WEIGHT: ICD-10-CM

## 2024-04-30 DIAGNOSIS — G30.9 SEVERE ALZHEIMER'S DEMENTIA WITH OTHER BEHAVIORAL DISTURBANCE, UNSPECIFIED TIMING OF DEMENTIA ONSET (MULTI): Primary | ICD-10-CM

## 2024-04-30 DIAGNOSIS — I69.30 SEQUELA OF CEREBROVASCULAR ACCIDENT: ICD-10-CM

## 2024-04-30 DIAGNOSIS — Z74.09 IMMOBILITY: ICD-10-CM

## 2024-04-30 DIAGNOSIS — F02.C18 SEVERE ALZHEIMER'S DEMENTIA WITH OTHER BEHAVIORAL DISTURBANCE, UNSPECIFIED TIMING OF DEMENTIA ONSET (MULTI): Primary | ICD-10-CM

## 2024-04-30 DIAGNOSIS — R53.1 LEFT-SIDED WEAKNESS: ICD-10-CM

## 2024-04-30 DIAGNOSIS — M62.81 GENERALIZED MUSCLE WEAKNESS: ICD-10-CM

## 2024-04-30 PROCEDURE — 99308 SBSQ NF CARE LOW MDM 20: CPT | Performed by: NURSE PRACTITIONER

## 2024-04-30 NOTE — LETTER
Patient: Shonda Solano  : 3/14/1934    Encounter Date: 2024    Name: Shonda Solano    : 3/14/1934    Code Status:  DNR-cc    Chief Complaint:  Follow up on Dementia; etc....    HPI   89 year old female patient at [facility] in Long Term Care.       Medical history includes: Difficulty in walking; muscle weakness (generalized); Acquired hammer toes on the right & left foot; Personal history of other infectious and parasitic diseases; disorientation; Alzheimer's disease; unspecified; symbolic dysfunctions; other speech and language deficits following unspecified cerebrovascular disease; mood disorder due to known physiological condition, unspecified; Bipolar disorder; Displaced intertrochanteric fracture of right femur; need for assistance with personal care; chronic kidney disease; primary osteoarthritis; unspecified ankle and foot; polyneuropathy, unspecified; unspecified abnormalities of gait and mobility; other osteoporosis without current pathological fracture.      Chronic comorbidities as follows:      Dementia; Alzheimer's disease; Weight fluctuation:   On donepezil.  Also follows up with in-house psych NP at .  No agitation reported.   Slight weight increase in April.   Recent weights as follows:  23 110.8 #  23 107.4 #  10/6/23 106.2 #  23 105.2 #  23 103 #  24 102.4 #  24 102.4 #  3/6/24 100 #  4/3/24 103.2 #   Also on house supplements and shakes TID.  Patient seen today, she is sitting up in her wheelchair.   Calm, cooperative, pleasantly confused.   Mentation at baseline.   A x O x 1.   No complaints of headaches or dizziness.  No complaints of chest pain, SOB, headaches or dizziness.  No complaints of headaches or dizziness.          Sequela of  CVA; left sided weakness; generalized muscle weakness; immobility:  Patient had a stroke in .  On ASA and Plavix.  Requires full supportive care.   Very weak.  In wheelchair.                              Reviewed  EMR  Reviewed medical, social, surgical and family history.  Reviewed all current medications and performed medication reconciliation.  Performed prescription drug management.  Reviewed vital signs AND lab results  Reviewed [facility] Documentation  Discussed patient with nursing.       ROS: Limited ROS due to mentation; ROS negative unless noted in HPI.     Surgical History  Problems    · History of Femur fracture repair   · History of Tonsillectomy     Family History  Mother    · Family history of   Father    · Family history of      Social History   · Never a smoker  No alcohol  No illicit drugs  Lives in long term care facility              Past Medical History:   Diagnosis Date   • Other malaise 2020    Physical deconditioning   • Personal history of other diseases of the digestive system     History of constipation   • Personal history of other diseases of the nervous system and sense organs 2020    History of polyneuropathy   • Personal history of urinary (tract) infections     History of urinary tract infection   • Unspecified dementia, unspecified severity, without behavioral disturbance, psychotic disturbance, mood disturbance, and anxiety (Multi) 12/10/2022    Dementia   • Unspecified diastolic (congestive) heart failure (Multi)     Diastolic heart failure   • Unspecified fracture of right femur, initial encounter for closed fracture (Multi)     Femur fracture, right             Lab Results   Component Value Date    WBC 9.1 2023    HGB 12.7 2023    HCT 41.6 2023     2023    ALT 8 2023    AST 14 2023     2023    K 4.1 2023     2023    CREATININE 0.81 2023    BUN 30 (H) 2023    CO2 21 2023    TSH 2.92 10/14/2020    INR 1.0 2022    HGBA1C 5.9 10/15/2020     Outside Labs  CBC: Date: 10/12/22, WBC: 4.8, Hgb: 11.9, Hct: 35.2, PLT: 290   CBC: Date:  7/7/22, WBC: 3.9, Hgb: 12.7, Hct: 38.5, PLT: 307   BMP: Date: 10/12/22, Na: 140, K: 4.3, Cl: 107, CO2: 25, BUN: 25, Cr: 0.6, Glu: 80, Ca: 9   BMP: Date: 7/7/22, Na: 141, K: 4.6, Cl: 105, CO2: 26, BUN: 30, Cr: 0.7, Glu: 113, Ca: 9.1   Hepatic Function Tests: Date: 3/8/22, AST: 15, ALT: 11, Alk Phos: 114, T Bili: 0.4, Albumin: 3.6   Hepatic Function Tests: Date: 3/9/21, AST: 15, ALT: 14, Alk Phos: 101, T Bili: 0.6, Albumin: 3.3   Lipid panel on 10/13/20 as follows: Cholesterol 186; triglyceride 84; HDL 51; ; VLDL 17.    Vitamin D 25 hydroxy level on 10/13/20 22 L    BMP: Date: 1/10/2023 Na 142 K 4.4 Cr 0.7 BUN 27 glucose 77 Ca 9.2 GFR 79  CBC: Date: 1/10/2023 WBC 6.9 Hgb 12.8 hematocrit 38.4 platelets 398  Liver function: Date: 1/10/2023 ALT 11 AST 18 alkaline phos.  112 bilirubin 0.5 albumin 3.7 protein 6.4    BMP: Date: 4/18/2023 Na 145 K 4 Cr 0.7 BUN 25 glucose 85 Ca 8.8 GFR 79  CBC: Date: 4/18/2023 WBC 5.2 Hgb 12.6 hematocrit 38.3 platelets 298  Liver function: Date: 4/18/2023 ALT 6 AST 11 alkaline phos.  94 bilirubin 0.5 albumin 3.4 protein 5.7   4/18/23 vitamin D 25 hydroxy level 19.62 L    BMP: Date: 8/8/2023 Na 142 K 4.9 Cr 0.6 BUN 24 glucose 65 Ca 9.2 GFR 94  CBC: Date: 8/8/2023 WBC 5.4 Hgb 12.8 hematocrit 40.4 platelets 398  Liver function: Date: 8/8/2023 ALT 8 AST 18 alkaline phos.  108 bilirubin 0.6 albumin 3.7 protein 6.4    8/8/23 vitamin D hydroxy level 29.92 L    BMP: Date: 10/6/2023 Na 142 K 4.5 Cr 0.7 BUN 28 glucose 86 Ca 9.8 GFR 79  CBC: Date: 10/6/2023 WBC 6.2 Hgb 12.7 hematocrit 39.3 platelets 358    10/6/2023: TSH 2.652 within normal limits (range 0.340-5.500)    10/6/2023: Vitamin D 25-hydroxy level 32.58 within normal limits (range )    11/2/2023: Vitamin D 25-hydroxy level 33.84    BMP: Date: 12/4/2023 Na 143 K 4.3 Cr 0.6 BUN 23 glucose 76 Ca 9.8 GFR 94  Liver function: date: 12/4/2023 ALT 10 AST 17 alkaline phos.  112 bilirubin 0.7 albumin 3.8 protein 6.5    12/4/2023 vitamin D  "25-hydroxy level 56 wnl    BMP: Date: 12/11/2023 Na 140 K 4.9 Cr 0.6 BUN 21 glucose 55 Ca 9.1 GFR 94  CBC: Date: 12/13/2023 WBC 5.9 Hgb 12.6 hematocrit 39.32 platelets 364  Liver function: Date: 12/11/2023 ALT 8 AST 15 alkaline phos.  97 bilirubin 0.5 albumin 3.3 protein 5.6    12/11/2023 vitamin D hydroxy level 43 wnl    BMP: Date: 1/29/2024 Na 142 K 4.1 Cr 0.7 BUN 23 glucose 81 Ca 9.7 GFR 79  CBC: Date: 1/29/2024 WBC 6.7 Hgb 13.1 hematocrit 40.6 platelets 368  Liver function: Date: 1/29/2024 ALT 14 AST 21 alkaline phos.  131 bilirubin 0.8 albumin 4 protein 6.7    BMP: Date: 3/11/2024 Na 140 K 4.1 Cr 0.6 BUN 23 glucose 129 Ca 9.4 GFR 94  CBC: Date: 3/11/2024 WBC 6.3 Hgb 12.7 hematocrit 39 platelets 381  Liver function: Date: 3/11/2024 ALT 9 AST 14 alkaline phos.  119 bilirubin 0.5 albumin 3.5 protein 6.1    3/11/2024 vitamin D 25-hydroxy level: 35 ( )    BMP: Date: 4/8/2024 Na 143 K 4.1 Cr 0.7 BUN 28 glucose 110 Ca 10.2 GFR 95  Liver function: Date: 4/8/2024 ALT 11 AST 22 alkaline phos.  134 bilirubin 1 albumin 4.1 protein 7.2    BMP: Date: 4/18/2024 Na 141 K 4.2 Cr 0.5 BUN 22 glucose 78 Ca 9.6   CBC: Date: 4/18/2024 WBC 7.8 Hgb 13.9 hematocrit 42.9 platelets 444  Liver function: Date: 4/18/2024 ALT 9 AST 15 alkaline phos.  111 bilirubin 0.5 albumin 3.6 protein 6.1    4/18/2024 vitamin D 25-hydroxy level 35 (range )    BMP: Date: 4/22/2024 Na 144 K 4.3 Cr 0.5 BUN 18 glucose 97 Ca 9   CBC: Date: 4/22/2024 WBC 6.5 Hgb 13.4 hematocrit 40.7 platelets 442  Liver function: Date: 4/22/2024 ALT 7 AST 13 alkaline phos.  98 bilirubin 0.6 albumin 3.3 protein 6    4/22/24 vitamin D 25 hydroxy level 34                /75   Pulse 69   Temp 36.8 °C (98.3 °F)   Resp 18   Ht 1.575 m (5' 2\")   Wt 46.8 kg (103 lb 3.2 oz)   SpO2 96%   BMI 18.88 kg/m²      Physical Exam  Vitals and nursing note reviewed.   Constitutional:       Appearance: Normal appearance.   HENT:      Head: Normocephalic.      " Right Ear: External ear normal.      Left Ear: External ear normal.      Nose: Nose normal.      Mouth/Throat:      Mouth: Mucous membranes are moist.      Pharynx: Oropharynx is clear.   Eyes:      Extraocular Movements: Extraocular movements intact.      Conjunctiva/sclera: Conjunctivae normal.      Pupils: Pupils are equal, round, and reactive to light.   Cardiovascular:      Rate and Rhythm: Normal rate and regular rhythm.      Pulses: Normal pulses.      Heart sounds: Normal heart sounds.   Pulmonary:      Effort: Pulmonary effort is normal.      Breath sounds: Normal breath sounds.   Abdominal:      General: Bowel sounds are normal.      Palpations: Abdomen is soft.   Musculoskeletal:         General: Normal range of motion.      Cervical back: Normal range of motion and neck supple.      Comments: Generalized muscle weakness; impaired gait and mobility   Skin:     General: Skin is warm and dry.   Neurological:      Mental Status: She is alert. Mental status is at baseline. She is disoriented.      Motor: Weakness present.      Coordination: Coordination abnormal.      Gait: Gait abnormal.   Psychiatric:         Mood and Affect: Affect is inappropriate.         Speech: Speech is tangential.         Cognition and Memory: Cognition is impaired. Memory is impaired.      Comments: Dementia   A x O x 1          Assessment/Plan           Dementia; Alzheimer's disease; Weight fluctuation:   Continue donepezil   Follow up with in-house psych NP at [facility].  Monitor for agitation.  Continue house supplements shakes TID          Sequela of  CVA; left sided weakness; generalized muscle weakness; immobility:  Patient had a stroke in October, 2020.  Continue ASA and Plavix.  Requires full supportive care.   Fall prevention strategies.      Problem List Items Addressed This Visit          Neuro    Dementia (Multi) - Primary     Other Visit Diagnoses       Fluctuation of weight        Sequela of cerebrovascular accident         Left-sided weakness        Generalized muscle weakness        Immobility              YIMI Jordan       Electronically Signed By: YIMI Jordan   5/4/24  8:51 PM

## 2024-05-04 VITALS
OXYGEN SATURATION: 96 % | SYSTOLIC BLOOD PRESSURE: 108 MMHG | RESPIRATION RATE: 18 BRPM | WEIGHT: 103.2 LBS | BODY MASS INDEX: 18.99 KG/M2 | DIASTOLIC BLOOD PRESSURE: 75 MMHG | HEIGHT: 62 IN | HEART RATE: 69 BPM | TEMPERATURE: 98.3 F

## 2024-05-04 NOTE — PROGRESS NOTES
Name: Shonda Solano    : 3/14/1934    Code Status:  DNR-cc    Chief Complaint:  Follow up on Dementia; etc....    HPI   89 year old female patient at [facility] in Long Term Care.       Medical history includes: Difficulty in walking; muscle weakness (generalized); Acquired hammer toes on the right & left foot; Personal history of other infectious and parasitic diseases; disorientation; Alzheimer's disease; unspecified; symbolic dysfunctions; other speech and language deficits following unspecified cerebrovascular disease; mood disorder due to known physiological condition, unspecified; Bipolar disorder; Displaced intertrochanteric fracture of right femur; need for assistance with personal care; chronic kidney disease; primary osteoarthritis; unspecified ankle and foot; polyneuropathy, unspecified; unspecified abnormalities of gait and mobility; other osteoporosis without current pathological fracture.      Chronic comorbidities as follows:      Dementia; Alzheimer's disease; Weight fluctuation:   On donepezil.  Also follows up with in-house psych NP at .  No agitation reported.   Slight weight increase in April.   Recent weights as follows:  23 110.8 #  23 107.4 #  10/6/23 106.2 #  23 105.2 #  23 103 #  24 102.4 #  24 102.4 #  3/6/24 100 #  4/3/24 103.2 #   Also on house supplements and shakes TID.  Patient seen today, she is sitting up in her wheelchair.   Calm, cooperative, pleasantly confused.   Mentation at baseline.   A x O x 1.   No complaints of headaches or dizziness.  No complaints of chest pain, SOB, headaches or dizziness.  No complaints of headaches or dizziness.          Sequela of  CVA; left sided weakness; generalized muscle weakness; immobility:  Patient had a stroke in .  On ASA and Plavix.  Requires full supportive care.   Very weak.  In wheelchair.                             Reviewed  EMR  Reviewed medical, social, surgical and  family history.  Reviewed all current medications and performed medication reconciliation.  Performed prescription drug management.  Reviewed vital signs AND lab results  Reviewed [facility] Documentation  Discussed patient with nursing.       ROS: Limited ROS due to mentation; ROS negative unless noted in HPI.     Surgical History  Problems    · History of Femur fracture repair   · History of Tonsillectomy     Family History  Mother    · Family history of   Father    · Family history of      Social History   · Never a smoker  No alcohol  No illicit drugs  Lives in long term care facility              Past Medical History:   Diagnosis Date    Other malaise 2020    Physical deconditioning    Personal history of other diseases of the digestive system     History of constipation    Personal history of other diseases of the nervous system and sense organs 2020    History of polyneuropathy    Personal history of urinary (tract) infections     History of urinary tract infection    Unspecified dementia, unspecified severity, without behavioral disturbance, psychotic disturbance, mood disturbance, and anxiety (Multi) 12/10/2022    Dementia    Unspecified diastolic (congestive) heart failure (Multi)     Diastolic heart failure    Unspecified fracture of right femur, initial encounter for closed fracture (Multi)     Femur fracture, right             Lab Results   Component Value Date    WBC 9.1 2023    HGB 12.7 2023    HCT 41.6 2023     2023    ALT 8 2023    AST 14 2023     2023    K 4.1 2023     2023    CREATININE 0.81 2023    BUN 30 (H) 2023    CO2 21 2023    TSH 2.92 10/14/2020    INR 1.0 2022    HGBA1C 5.9 10/15/2020     Outside Labs  CBC: Date: 10/12/22, WBC: 4.8, Hgb: 11.9, Hct: 35.2, PLT: 290   CBC: Date: 22, WBC: 3.9, Hgb: 12.7, Hct: 38.5, PLT: 307   BMP: Date: 10/12/22, Na: 140, K:  4.3, Cl: 107, CO2: 25, BUN: 25, Cr: 0.6, Glu: 80, Ca: 9   BMP: Date: 7/7/22, Na: 141, K: 4.6, Cl: 105, CO2: 26, BUN: 30, Cr: 0.7, Glu: 113, Ca: 9.1   Hepatic Function Tests: Date: 3/8/22, AST: 15, ALT: 11, Alk Phos: 114, T Bili: 0.4, Albumin: 3.6   Hepatic Function Tests: Date: 3/9/21, AST: 15, ALT: 14, Alk Phos: 101, T Bili: 0.6, Albumin: 3.3   Lipid panel on 10/13/20 as follows: Cholesterol 186; triglyceride 84; HDL 51; ; VLDL 17.    Vitamin D 25 hydroxy level on 10/13/20 22 L    BMP: Date: 1/10/2023 Na 142 K 4.4 Cr 0.7 BUN 27 glucose 77 Ca 9.2 GFR 79  CBC: Date: 1/10/2023 WBC 6.9 Hgb 12.8 hematocrit 38.4 platelets 398  Liver function: Date: 1/10/2023 ALT 11 AST 18 alkaline phos.  112 bilirubin 0.5 albumin 3.7 protein 6.4    BMP: Date: 4/18/2023 Na 145 K 4 Cr 0.7 BUN 25 glucose 85 Ca 8.8 GFR 79  CBC: Date: 4/18/2023 WBC 5.2 Hgb 12.6 hematocrit 38.3 platelets 298  Liver function: Date: 4/18/2023 ALT 6 AST 11 alkaline phos.  94 bilirubin 0.5 albumin 3.4 protein 5.7   4/18/23 vitamin D 25 hydroxy level 19.62 L    BMP: Date: 8/8/2023 Na 142 K 4.9 Cr 0.6 BUN 24 glucose 65 Ca 9.2 GFR 94  CBC: Date: 8/8/2023 WBC 5.4 Hgb 12.8 hematocrit 40.4 platelets 398  Liver function: Date: 8/8/2023 ALT 8 AST 18 alkaline phos.  108 bilirubin 0.6 albumin 3.7 protein 6.4    8/8/23 vitamin D hydroxy level 29.92 L    BMP: Date: 10/6/2023 Na 142 K 4.5 Cr 0.7 BUN 28 glucose 86 Ca 9.8 GFR 79  CBC: Date: 10/6/2023 WBC 6.2 Hgb 12.7 hematocrit 39.3 platelets 358    10/6/2023: TSH 2.652 within normal limits (range 0.340-5.500)    10/6/2023: Vitamin D 25-hydroxy level 32.58 within normal limits (range )    11/2/2023: Vitamin D 25-hydroxy level 33.84    BMP: Date: 12/4/2023 Na 143 K 4.3 Cr 0.6 BUN 23 glucose 76 Ca 9.8 GFR 94  Liver function: date: 12/4/2023 ALT 10 AST 17 alkaline phos.  112 bilirubin 0.7 albumin 3.8 protein 6.5    12/4/2023 vitamin D 25-hydroxy level 56 wnl    BMP: Date: 12/11/2023 Na 140 K 4.9 Cr 0.6 BUN 21 glucose  "55 Ca 9.1 GFR 94  CBC: Date: 12/13/2023 WBC 5.9 Hgb 12.6 hematocrit 39.32 platelets 364  Liver function: Date: 12/11/2023 ALT 8 AST 15 alkaline phos.  97 bilirubin 0.5 albumin 3.3 protein 5.6    12/11/2023 vitamin D hydroxy level 43 wnl    BMP: Date: 1/29/2024 Na 142 K 4.1 Cr 0.7 BUN 23 glucose 81 Ca 9.7 GFR 79  CBC: Date: 1/29/2024 WBC 6.7 Hgb 13.1 hematocrit 40.6 platelets 368  Liver function: Date: 1/29/2024 ALT 14 AST 21 alkaline phos.  131 bilirubin 0.8 albumin 4 protein 6.7    BMP: Date: 3/11/2024 Na 140 K 4.1 Cr 0.6 BUN 23 glucose 129 Ca 9.4 GFR 94  CBC: Date: 3/11/2024 WBC 6.3 Hgb 12.7 hematocrit 39 platelets 381  Liver function: Date: 3/11/2024 ALT 9 AST 14 alkaline phos.  119 bilirubin 0.5 albumin 3.5 protein 6.1    3/11/2024 vitamin D 25-hydroxy level: 35 ( )    BMP: Date: 4/8/2024 Na 143 K 4.1 Cr 0.7 BUN 28 glucose 110 Ca 10.2 GFR 95  Liver function: Date: 4/8/2024 ALT 11 AST 22 alkaline phos.  134 bilirubin 1 albumin 4.1 protein 7.2    BMP: Date: 4/18/2024 Na 141 K 4.2 Cr 0.5 BUN 22 glucose 78 Ca 9.6   CBC: Date: 4/18/2024 WBC 7.8 Hgb 13.9 hematocrit 42.9 platelets 444  Liver function: Date: 4/18/2024 ALT 9 AST 15 alkaline phos.  111 bilirubin 0.5 albumin 3.6 protein 6.1    4/18/2024 vitamin D 25-hydroxy level 35 (range )    BMP: Date: 4/22/2024 Na 144 K 4.3 Cr 0.5 BUN 18 glucose 97 Ca 9   CBC: Date: 4/22/2024 WBC 6.5 Hgb 13.4 hematocrit 40.7 platelets 442  Liver function: Date: 4/22/2024 ALT 7 AST 13 alkaline phos.  98 bilirubin 0.6 albumin 3.3 protein 6    4/22/24 vitamin D 25 hydroxy level 34                /75   Pulse 69   Temp 36.8 °C (98.3 °F)   Resp 18   Ht 1.575 m (5' 2\")   Wt 46.8 kg (103 lb 3.2 oz)   SpO2 96%   BMI 18.88 kg/m²      Physical Exam  Vitals and nursing note reviewed.   Constitutional:       Appearance: Normal appearance.   HENT:      Head: Normocephalic.      Right Ear: External ear normal.      Left Ear: External ear normal.      Nose: Nose " normal.      Mouth/Throat:      Mouth: Mucous membranes are moist.      Pharynx: Oropharynx is clear.   Eyes:      Extraocular Movements: Extraocular movements intact.      Conjunctiva/sclera: Conjunctivae normal.      Pupils: Pupils are equal, round, and reactive to light.   Cardiovascular:      Rate and Rhythm: Normal rate and regular rhythm.      Pulses: Normal pulses.      Heart sounds: Normal heart sounds.   Pulmonary:      Effort: Pulmonary effort is normal.      Breath sounds: Normal breath sounds.   Abdominal:      General: Bowel sounds are normal.      Palpations: Abdomen is soft.   Musculoskeletal:         General: Normal range of motion.      Cervical back: Normal range of motion and neck supple.      Comments: Generalized muscle weakness; impaired gait and mobility   Skin:     General: Skin is warm and dry.   Neurological:      Mental Status: She is alert. Mental status is at baseline. She is disoriented.      Motor: Weakness present.      Coordination: Coordination abnormal.      Gait: Gait abnormal.   Psychiatric:         Mood and Affect: Affect is inappropriate.         Speech: Speech is tangential.         Cognition and Memory: Cognition is impaired. Memory is impaired.      Comments: Dementia   A x O x 1          Assessment/Plan            Dementia; Alzheimer's disease; Weight fluctuation:   Continue donepezil   Follow up with in-house psych NP at [facility].  Monitor for agitation.  Continue house supplements shakes TID          Sequela of  CVA; left sided weakness; generalized muscle weakness; immobility:  Patient had a stroke in October, 2020.  Continue ASA and Plavix.  Requires full supportive care.   Fall prevention strategies.      Problem List Items Addressed This Visit          Neuro    Dementia (Multi) - Primary     Other Visit Diagnoses       Fluctuation of weight        Sequela of cerebrovascular accident        Left-sided weakness        Generalized muscle weakness        Immobility               Sandi Rudolph, APRN-CNP

## 2024-05-30 ENCOUNTER — NURSING HOME VISIT (OUTPATIENT)
Dept: POST ACUTE CARE | Facility: EXTERNAL LOCATION | Age: 89
End: 2024-05-30
Payer: COMMERCIAL

## 2024-05-30 DIAGNOSIS — I69.30 SEQUELA OF CEREBROVASCULAR ACCIDENT: ICD-10-CM

## 2024-05-30 DIAGNOSIS — R68.89 FLUCTUATION OF WEIGHT: ICD-10-CM

## 2024-05-30 DIAGNOSIS — R53.1 LEFT-SIDED WEAKNESS: ICD-10-CM

## 2024-05-30 DIAGNOSIS — M62.81 GENERALIZED MUSCLE WEAKNESS: ICD-10-CM

## 2024-05-30 DIAGNOSIS — Z74.09 IMMOBILITY: ICD-10-CM

## 2024-05-30 DIAGNOSIS — E55.9 VITAMIN D DEFICIENCY: Primary | ICD-10-CM

## 2024-05-30 DIAGNOSIS — G30.9 SEVERE ALZHEIMER'S DEMENTIA WITH OTHER BEHAVIORAL DISTURBANCE, UNSPECIFIED TIMING OF DEMENTIA ONSET (MULTI): ICD-10-CM

## 2024-05-30 DIAGNOSIS — F02.C18 SEVERE ALZHEIMER'S DEMENTIA WITH OTHER BEHAVIORAL DISTURBANCE, UNSPECIFIED TIMING OF DEMENTIA ONSET (MULTI): ICD-10-CM

## 2024-05-30 PROCEDURE — 99308 SBSQ NF CARE LOW MDM 20: CPT | Performed by: NURSE PRACTITIONER

## 2024-05-30 NOTE — LETTER
Patient: Shonda Solano  : 3/14/1934    Encounter Date: 2024    Name: Shonda Solano YOB: 1934    Code Status:  DNR-cc    Chief Complaint:  Follow up on vitamin D deficiency;  etc....    HPI   89 year old female patient at [facility] in Long Term Care.       Medical history includes: Difficulty in walking; muscle weakness (generalized); Acquired hammer toes on the right & left foot; Personal history of other infectious and parasitic diseases; disorientation; Alzheimer's disease; unspecified; symbolic dysfunctions; other speech and language deficits following unspecified cerebrovascular disease; mood disorder due to known physiological condition, unspecified; Bipolar disorder; Displaced intertrochanteric fracture of right femur; need for assistance with personal care; chronic kidney disease; primary osteoarthritis; unspecified ankle and foot; polyneuropathy, unspecified; unspecified abnormalities of gait and mobility; other osteoporosis without current pathological fracture.      Chronic comorbidities as follows:     Vitamin D deficiency:  On vitamin D 3 1000 units PO every day.  Recent vitamin D 25 hydroxy levels:  10/6/2023: Vitamin D 25-hydroxy level 32.58 within normal limits (range )   2023: Vitamin D 25-hydroxy level 33.84  2023 vitamin D hydroxy level 43 wnl  24 vitamin D hydroxy level 41 wnl  3/11/2024 vitamin D 25-hydroxy level: 35   24 vitamin D 25 hydroxy level 29 L  Patient seen today, she is sitting up in her wheelchair.   Calm, cooperative, pleasantly confused.   Mentation at baseline.   A x O x 1.   No complaints of headaches or dizziness.  No complaints of chest pain, SOB, headaches or dizziness.  No complaints of headaches or dizziness.               Dementia; Alzheimer's disease; Weight fluctuation:   On donepezil.  Also follows up with in-house psych NP at [facility].  No agitation reported.   Slight weight increase in April.   Recent  weights as follows:  23 110.8 #  23 107.4 #  10/6/23 106.2 #  23 105.2 #  23 103 #  24 102.4 #  24 102.4 #  3/6/24 100 #  4/3/24 103.2 #  24 97.6 #   Also on house supplements and shakes TID.          Sequela of  CVA; left sided weakness; immobility; generalized muscle weakness:  Patient had a stroke in .  On ASA and Plavix.  Requires full supportive care.   Very weak.  In wheelchair.                             Reviewed  EMR  Reviewed medical, social, surgical and family history.  Reviewed all current medications and performed medication reconciliation.  Performed prescription drug management.  Reviewed vital signs AND lab results  Reviewed [facility] Documentation  Discussed patient with nursing.       ROS: Limited ROS due to mentation; ROS negative unless noted in HPI.     Surgical History  Problems    · History of Femur fracture repair   · History of Tonsillectomy     Family History  Mother    · Family history of   Father    · Family history of      Social History   · Never a smoker  No alcohol  No illicit drugs  Lives in long term care facility              Past Medical History:   Diagnosis Date   • Other malaise 2020    Physical deconditioning   • Personal history of other diseases of the digestive system     History of constipation   • Personal history of other diseases of the nervous system and sense organs 2020    History of polyneuropathy   • Personal history of urinary (tract) infections     History of urinary tract infection   • Unspecified dementia, unspecified severity, without behavioral disturbance, psychotic disturbance, mood disturbance, and anxiety (Multi) 12/10/2022    Dementia   • Unspecified diastolic (congestive) heart failure (Multi)     Diastolic heart failure   • Unspecified fracture of right femur, initial encounter for closed fracture (Multi)     Femur fracture, right             Lab Results   Component  Value Date    WBC 9.1 08/12/2023    HGB 12.7 08/12/2023    HCT 41.6 08/12/2023     08/12/2023    ALT 8 08/12/2023    AST 14 08/12/2023     08/12/2023    K 4.1 08/12/2023     08/12/2023    CREATININE 0.81 08/12/2023    BUN 30 (H) 08/12/2023    CO2 21 08/12/2023    TSH 2.92 10/14/2020    INR 1.0 02/20/2022    HGBA1C 5.9 10/15/2020     Outside Labs  CBC: Date: 10/12/22, WBC: 4.8, Hgb: 11.9, Hct: 35.2, PLT: 290   CBC: Date: 7/7/22, WBC: 3.9, Hgb: 12.7, Hct: 38.5, PLT: 307   BMP: Date: 10/12/22, Na: 140, K: 4.3, Cl: 107, CO2: 25, BUN: 25, Cr: 0.6, Glu: 80, Ca: 9   BMP: Date: 7/7/22, Na: 141, K: 4.6, Cl: 105, CO2: 26, BUN: 30, Cr: 0.7, Glu: 113, Ca: 9.1   Hepatic Function Tests: Date: 3/8/22, AST: 15, ALT: 11, Alk Phos: 114, T Bili: 0.4, Albumin: 3.6   Hepatic Function Tests: Date: 3/9/21, AST: 15, ALT: 14, Alk Phos: 101, T Bili: 0.6, Albumin: 3.3   Lipid panel on 10/13/20 as follows: Cholesterol 186; triglyceride 84; HDL 51; ; VLDL 17.    Vitamin D 25 hydroxy level on 10/13/20 22 L    BMP: Date: 1/10/2023 Na 142 K 4.4 Cr 0.7 BUN 27 glucose 77 Ca 9.2 GFR 79  CBC: Date: 1/10/2023 WBC 6.9 Hgb 12.8 hematocrit 38.4 platelets 398  Liver function: Date: 1/10/2023 ALT 11 AST 18 alkaline phos.  112 bilirubin 0.5 albumin 3.7 protein 6.4    BMP: Date: 4/18/2023 Na 145 K 4 Cr 0.7 BUN 25 glucose 85 Ca 8.8 GFR 79  CBC: Date: 4/18/2023 WBC 5.2 Hgb 12.6 hematocrit 38.3 platelets 298  Liver function: Date: 4/18/2023 ALT 6 AST 11 alkaline phos.  94 bilirubin 0.5 albumin 3.4 protein 5.7   4/18/23 vitamin D 25 hydroxy level 19.62 L    BMP: Date: 8/8/2023 Na 142 K 4.9 Cr 0.6 BUN 24 glucose 65 Ca 9.2 GFR 94  CBC: Date: 8/8/2023 WBC 5.4 Hgb 12.8 hematocrit 40.4 platelets 398  Liver function: Date: 8/8/2023 ALT 8 AST 18 alkaline phos.  108 bilirubin 0.6 albumin 3.7 protein 6.4    8/8/23 vitamin D hydroxy level 29.92 L    BMP: Date: 10/6/2023 Na 142 K 4.5 Cr 0.7 BUN 28 glucose 86 Ca 9.8 GFR 79  CBC: Date: 10/6/2023 WBC  6.2 Hgb 12.7 hematocrit 39.3 platelets 358    10/6/2023: TSH 2.652 within normal limits (range 0.340-5.500)    10/6/2023: Vitamin D 25-hydroxy level 32.58 within normal limits (range )    11/2/2023: Vitamin D 25-hydroxy level 33.84    BMP: Date: 12/4/2023 Na 143 K 4.3 Cr 0.6 BUN 23 glucose 76 Ca 9.8 GFR 94  Liver function: date: 12/4/2023 ALT 10 AST 17 alkaline phos.  112 bilirubin 0.7 albumin 3.8 protein 6.5    12/4/2023 vitamin D 25-hydroxy level 56 wnl    BMP: Date: 12/11/2023 Na 140 K 4.9 Cr 0.6 BUN 21 glucose 55 Ca 9.1 GFR 94  CBC: Date: 12/13/2023 WBC 5.9 Hgb 12.6 hematocrit 39.32 platelets 364  Liver function: Date: 12/11/2023 ALT 8 AST 15 alkaline phos.  97 bilirubin 0.5 albumin 3.3 protein 5.6    12/11/2023 vitamin D hydroxy level 43 wnl    BMP: Date: 1/29/2024 Na 142 K 4.1 Cr 0.7 BUN 23 glucose 81 Ca 9.7 GFR 79  CBC: Date: 1/29/2024 WBC 6.7 Hgb 13.1 hematocrit 40.6 platelets 368  Liver function: Date: 1/29/2024 ALT 14 AST 21 alkaline phos.  131 bilirubin 0.8 albumin 4 protein 6.7    BMP: Date: 3/11/2024 Na 140 K 4.1 Cr 0.6 BUN 23 glucose 129 Ca 9.4 GFR 94  CBC: Date: 3/11/2024 WBC 6.3 Hgb 12.7 hematocrit 39 platelets 381  Liver function: Date: 3/11/2024 ALT 9 AST 14 alkaline phos.  119 bilirubin 0.5 albumin 3.5 protein 6.1    3/11/2024 vitamin D 25-hydroxy level: 35 ( )    BMP: Date: 4/8/2024 Na 143 K 4.1 Cr 0.7 BUN 28 glucose 110 Ca 10.2 GFR 95  Liver function: Date: 4/8/2024 ALT 11 AST 22 alkaline phos.  134 bilirubin 1 albumin 4.1 protein 7.2    BMP: Date: 4/18/2024 Na 141 K 4.2 Cr 0.5 BUN 22 glucose 78 Ca 9.6   CBC: Date: 4/18/2024 WBC 7.8 Hgb 13.9 hematocrit 42.9 platelets 444  Liver function: Date: 4/18/2024 ALT 9 AST 15 alkaline phos.  111 bilirubin 0.5 albumin 3.6 protein 6.1    4/18/2024 vitamin D 25-hydroxy level 35 (range )    BMP: Date: 4/22/2024 Na 144 K 4.3 Cr 0.5 BUN 18 glucose 97 Ca 9   CBC: Date: 4/22/2024 WBC 6.5 Hgb 13.4 hematocrit 40.7 platelets  "442  Liver function: Date: 4/22/2024 ALT 7 AST 13 alkaline phos.  98 bilirubin 0.6 albumin 3.3 protein 6    4/22/24 vitamin D 25 hydroxy level 34                /65   Pulse 66   Temp 36.4 °C (97.5 °F)   Resp 16   Ht 1.575 m (5' 2\")   Wt (!) 44 kg (97 lb)   SpO2 96%   BMI 17.74 kg/m²      Physical Exam  Vitals and nursing note reviewed.   Constitutional:       Appearance: Normal appearance.   HENT:      Head: Normocephalic.      Right Ear: External ear normal.      Left Ear: External ear normal.      Nose: Nose normal.      Mouth/Throat:      Mouth: Mucous membranes are moist.      Pharynx: Oropharynx is clear.   Eyes:      Extraocular Movements: Extraocular movements intact.      Conjunctiva/sclera: Conjunctivae normal.      Pupils: Pupils are equal, round, and reactive to light.   Cardiovascular:      Rate and Rhythm: Normal rate and regular rhythm.      Pulses: Normal pulses.      Heart sounds: Normal heart sounds.   Pulmonary:      Effort: Pulmonary effort is normal.      Breath sounds: Normal breath sounds.   Abdominal:      General: Bowel sounds are normal.      Palpations: Abdomen is soft.   Musculoskeletal:         General: Normal range of motion.      Cervical back: Normal range of motion and neck supple.      Comments: Generalized muscle weakness; impaired gait and mobility   Skin:     General: Skin is warm and dry.   Neurological:      Mental Status: She is alert. Mental status is at baseline. She is disoriented.      Motor: Weakness present.      Coordination: Coordination abnormal.      Gait: Gait abnormal.   Psychiatric:         Mood and Affect: Affect is inappropriate.         Speech: Speech is tangential.         Cognition and Memory: Cognition is impaired. Memory is impaired.      Comments: Dementia   A x O x 1          Assessment/Plan    Vitamin D deficiency:  On vitamin D 3 1000 units PO every day.  DC vitamin D 3 1000 units PO every day.  Start vitamin D 3 5000 units PO every day.   "             Dementia; Alzheimer's disease; Weight fluctuation:   Continue donepezil   Follow up with in-house psych NP at [facility].  Monitor for agitation.  Continue house supplements shakes TID          Sequela of  CVA; left sided weakness; immobility; generalized muscle weakness:  Patient had a stroke in October, 2020.  Continue ASA and Plavix.  Requires full supportive care.   Fall prevention strategies.      Problem List Items Addressed This Visit          Neuro    Dementia (Multi)     Other Visit Diagnoses       Vitamin D deficiency    -  Primary    Fluctuation of weight        Sequela of cerebrovascular accident        Left-sided weakness        Immobility        Generalized muscle weakness                YIMI Jordan       Electronically Signed By: YIMI Jordan   6/3/24  1:06 AM

## 2024-06-03 VITALS
OXYGEN SATURATION: 96 % | HEART RATE: 66 BPM | WEIGHT: 97 LBS | HEIGHT: 62 IN | BODY MASS INDEX: 17.85 KG/M2 | DIASTOLIC BLOOD PRESSURE: 65 MMHG | SYSTOLIC BLOOD PRESSURE: 123 MMHG | TEMPERATURE: 97.5 F | RESPIRATION RATE: 16 BRPM

## 2024-06-03 NOTE — PROGRESS NOTES
Name: Shonda Solano    : 3/14/1934    Code Status:  DNR-cc    Chief Complaint:  Follow up on vitamin D deficiency;  etc....    HPI   89 year old female patient at [facility] in Long Term [facility].       Medical history includes: Difficulty in walking; muscle weakness (generalized); Acquired hammer toes on the right & left foot; Personal history of other infectious and parasitic diseases; disorientation; Alzheimer's disease; unspecified; symbolic dysfunctions; other speech and language deficits following unspecified cerebrovascular disease; mood disorder due to known physiological condition, unspecified; Bipolar disorder; Displaced intertrochanteric fracture of right femur; need for assistance with personal care; chronic kidney disease; primary osteoarthritis; unspecified ankle and foot; polyneuropathy, unspecified; unspecified abnormalities of gait and mobility; other osteoporosis without current pathological fracture.      Chronic comorbidities as follows:     Vitamin D deficiency:  On vitamin D 3 1000 units PO every day.  Recent vitamin D 25 hydroxy levels:  10/6/2023: Vitamin D 25-hydroxy level 32.58 within normal limits (range )   2023: Vitamin D 25-hydroxy level 33.84  2023 vitamin D hydroxy level 43 wnl  24 vitamin D hydroxy level 41 wnl  3/11/2024 vitamin D 25-hydroxy level: 35   24 vitamin D 25 hydroxy level 29 L  Patient seen today, she is sitting up in her wheelchair.   Calm, cooperative, pleasantly confused.   Mentation at baseline.   A x O x 1.   No complaints of headaches or dizziness.  No complaints of chest pain, SOB, headaches or dizziness.  No complaints of headaches or dizziness.               Dementia; Alzheimer's disease; Weight fluctuation:   On donepezil.  Also follows up with in-house psych NP at .  No agitation reported.   Slight weight increase in April.   Recent weights as follows:  23 110.8 #  23 107.4 #  10/6/23 106.2 #  23  105.2 #  23 103 #  24 102.4 #  24 102.4 #  3/6/24 100 #  4/3/24 103.2 #  24 97.6 #   Also on house supplements and shakes TID.          Sequela of  CVA; left sided weakness; immobility; generalized muscle weakness:  Patient had a stroke in .  On ASA and Plavix.  Requires full supportive care.   Very weak.  In wheelchair.                             Reviewed  EMR  Reviewed medical, social, surgical and family history.  Reviewed all current medications and performed medication reconciliation.  Performed prescription drug management.  Reviewed vital signs AND lab results  Reviewed [facility] Documentation  Discussed patient with nursing.       ROS: Limited ROS due to mentation; ROS negative unless noted in HPI.     Surgical History  Problems    · History of Femur fracture repair   · History of Tonsillectomy     Family History  Mother    · Family history of   Father    · Family history of      Social History   · Never a smoker  No alcohol  No illicit drugs  Lives in long term care facility              Past Medical History:   Diagnosis Date    Other malaise 2020    Physical deconditioning    Personal history of other diseases of the digestive system     History of constipation    Personal history of other diseases of the nervous system and sense organs 2020    History of polyneuropathy    Personal history of urinary (tract) infections     History of urinary tract infection    Unspecified dementia, unspecified severity, without behavioral disturbance, psychotic disturbance, mood disturbance, and anxiety (Multi) 12/10/2022    Dementia    Unspecified diastolic (congestive) heart failure (Multi)     Diastolic heart failure    Unspecified fracture of right femur, initial encounter for closed fracture (Multi)     Femur fracture, right             Lab Results   Component Value Date    WBC 9.1 2023    HGB 12.7 2023    HCT 41.6 2023      08/12/2023    ALT 8 08/12/2023    AST 14 08/12/2023     08/12/2023    K 4.1 08/12/2023     08/12/2023    CREATININE 0.81 08/12/2023    BUN 30 (H) 08/12/2023    CO2 21 08/12/2023    TSH 2.92 10/14/2020    INR 1.0 02/20/2022    HGBA1C 5.9 10/15/2020     Outside Labs  CBC: Date: 10/12/22, WBC: 4.8, Hgb: 11.9, Hct: 35.2, PLT: 290   CBC: Date: 7/7/22, WBC: 3.9, Hgb: 12.7, Hct: 38.5, PLT: 307   BMP: Date: 10/12/22, Na: 140, K: 4.3, Cl: 107, CO2: 25, BUN: 25, Cr: 0.6, Glu: 80, Ca: 9   BMP: Date: 7/7/22, Na: 141, K: 4.6, Cl: 105, CO2: 26, BUN: 30, Cr: 0.7, Glu: 113, Ca: 9.1   Hepatic Function Tests: Date: 3/8/22, AST: 15, ALT: 11, Alk Phos: 114, T Bili: 0.4, Albumin: 3.6   Hepatic Function Tests: Date: 3/9/21, AST: 15, ALT: 14, Alk Phos: 101, T Bili: 0.6, Albumin: 3.3   Lipid panel on 10/13/20 as follows: Cholesterol 186; triglyceride 84; HDL 51; ; VLDL 17.    Vitamin D 25 hydroxy level on 10/13/20 22 L    BMP: Date: 1/10/2023 Na 142 K 4.4 Cr 0.7 BUN 27 glucose 77 Ca 9.2 GFR 79  CBC: Date: 1/10/2023 WBC 6.9 Hgb 12.8 hematocrit 38.4 platelets 398  Liver function: Date: 1/10/2023 ALT 11 AST 18 alkaline phos.  112 bilirubin 0.5 albumin 3.7 protein 6.4    BMP: Date: 4/18/2023 Na 145 K 4 Cr 0.7 BUN 25 glucose 85 Ca 8.8 GFR 79  CBC: Date: 4/18/2023 WBC 5.2 Hgb 12.6 hematocrit 38.3 platelets 298  Liver function: Date: 4/18/2023 ALT 6 AST 11 alkaline phos.  94 bilirubin 0.5 albumin 3.4 protein 5.7   4/18/23 vitamin D 25 hydroxy level 19.62 L    BMP: Date: 8/8/2023 Na 142 K 4.9 Cr 0.6 BUN 24 glucose 65 Ca 9.2 GFR 94  CBC: Date: 8/8/2023 WBC 5.4 Hgb 12.8 hematocrit 40.4 platelets 398  Liver function: Date: 8/8/2023 ALT 8 AST 18 alkaline phos.  108 bilirubin 0.6 albumin 3.7 protein 6.4    8/8/23 vitamin D hydroxy level 29.92 L    BMP: Date: 10/6/2023 Na 142 K 4.5 Cr 0.7 BUN 28 glucose 86 Ca 9.8 GFR 79  CBC: Date: 10/6/2023 WBC 6.2 Hgb 12.7 hematocrit 39.3 platelets 358    10/6/2023: TSH 2.652 within normal limits  (range 0.340-5.500)    10/6/2023: Vitamin D 25-hydroxy level 32.58 within normal limits (range )    11/2/2023: Vitamin D 25-hydroxy level 33.84    BMP: Date: 12/4/2023 Na 143 K 4.3 Cr 0.6 BUN 23 glucose 76 Ca 9.8 GFR 94  Liver function: date: 12/4/2023 ALT 10 AST 17 alkaline phos.  112 bilirubin 0.7 albumin 3.8 protein 6.5    12/4/2023 vitamin D 25-hydroxy level 56 wnl    BMP: Date: 12/11/2023 Na 140 K 4.9 Cr 0.6 BUN 21 glucose 55 Ca 9.1 GFR 94  CBC: Date: 12/13/2023 WBC 5.9 Hgb 12.6 hematocrit 39.32 platelets 364  Liver function: Date: 12/11/2023 ALT 8 AST 15 alkaline phos.  97 bilirubin 0.5 albumin 3.3 protein 5.6    12/11/2023 vitamin D hydroxy level 43 wnl    BMP: Date: 1/29/2024 Na 142 K 4.1 Cr 0.7 BUN 23 glucose 81 Ca 9.7 GFR 79  CBC: Date: 1/29/2024 WBC 6.7 Hgb 13.1 hematocrit 40.6 platelets 368  Liver function: Date: 1/29/2024 ALT 14 AST 21 alkaline phos.  131 bilirubin 0.8 albumin 4 protein 6.7    BMP: Date: 3/11/2024 Na 140 K 4.1 Cr 0.6 BUN 23 glucose 129 Ca 9.4 GFR 94  CBC: Date: 3/11/2024 WBC 6.3 Hgb 12.7 hematocrit 39 platelets 381  Liver function: Date: 3/11/2024 ALT 9 AST 14 alkaline phos.  119 bilirubin 0.5 albumin 3.5 protein 6.1    3/11/2024 vitamin D 25-hydroxy level: 35 ( )    BMP: Date: 4/8/2024 Na 143 K 4.1 Cr 0.7 BUN 28 glucose 110 Ca 10.2 GFR 95  Liver function: Date: 4/8/2024 ALT 11 AST 22 alkaline phos.  134 bilirubin 1 albumin 4.1 protein 7.2    BMP: Date: 4/18/2024 Na 141 K 4.2 Cr 0.5 BUN 22 glucose 78 Ca 9.6   CBC: Date: 4/18/2024 WBC 7.8 Hgb 13.9 hematocrit 42.9 platelets 444  Liver function: Date: 4/18/2024 ALT 9 AST 15 alkaline phos.  111 bilirubin 0.5 albumin 3.6 protein 6.1    4/18/2024 vitamin D 25-hydroxy level 35 (range )    BMP: Date: 4/22/2024 Na 144 K 4.3 Cr 0.5 BUN 18 glucose 97 Ca 9   CBC: Date: 4/22/2024 WBC 6.5 Hgb 13.4 hematocrit 40.7 platelets 442  Liver function: Date: 4/22/2024 ALT 7 AST 13 alkaline phos.  98 bilirubin 0.6 albumin 3.3  "protein 6    4/22/24 vitamin D 25 hydroxy level 34                /65   Pulse 66   Temp 36.4 °C (97.5 °F)   Resp 16   Ht 1.575 m (5' 2\")   Wt (!) 44 kg (97 lb)   SpO2 96%   BMI 17.74 kg/m²      Physical Exam  Vitals and nursing note reviewed.   Constitutional:       Appearance: Normal appearance.   HENT:      Head: Normocephalic.      Right Ear: External ear normal.      Left Ear: External ear normal.      Nose: Nose normal.      Mouth/Throat:      Mouth: Mucous membranes are moist.      Pharynx: Oropharynx is clear.   Eyes:      Extraocular Movements: Extraocular movements intact.      Conjunctiva/sclera: Conjunctivae normal.      Pupils: Pupils are equal, round, and reactive to light.   Cardiovascular:      Rate and Rhythm: Normal rate and regular rhythm.      Pulses: Normal pulses.      Heart sounds: Normal heart sounds.   Pulmonary:      Effort: Pulmonary effort is normal.      Breath sounds: Normal breath sounds.   Abdominal:      General: Bowel sounds are normal.      Palpations: Abdomen is soft.   Musculoskeletal:         General: Normal range of motion.      Cervical back: Normal range of motion and neck supple.      Comments: Generalized muscle weakness; impaired gait and mobility   Skin:     General: Skin is warm and dry.   Neurological:      Mental Status: She is alert. Mental status is at baseline. She is disoriented.      Motor: Weakness present.      Coordination: Coordination abnormal.      Gait: Gait abnormal.   Psychiatric:         Mood and Affect: Affect is inappropriate.         Speech: Speech is tangential.         Cognition and Memory: Cognition is impaired. Memory is impaired.      Comments: Dementia   A x O x 1          Assessment/Plan     Vitamin D deficiency:  On vitamin D 3 1000 units PO every day.  DC vitamin D 3 1000 units PO every day.  Start vitamin D 3 5000 units PO every day.               Dementia; Alzheimer's disease; Weight fluctuation:   Continue donepezil   Follow up " with in-house psych NP at [facility].  Monitor for agitation.  Continue house supplements shakes TID          Sequela of  CVA; left sided weakness; immobility; generalized muscle weakness:  Patient had a stroke in October, 2020.  Continue ASA and Plavix.  Requires full supportive care.   Fall prevention strategies.      Problem List Items Addressed This Visit          Neuro    Dementia (Multi)     Other Visit Diagnoses       Vitamin D deficiency    -  Primary    Fluctuation of weight        Sequela of cerebrovascular accident        Left-sided weakness        Immobility        Generalized muscle weakness                Sandi Rudolph, APRN-CNP

## 2024-06-18 ENCOUNTER — NURSING HOME VISIT (OUTPATIENT)
Dept: POST ACUTE CARE | Facility: EXTERNAL LOCATION | Age: 89
End: 2024-06-18
Payer: COMMERCIAL

## 2024-06-18 DIAGNOSIS — M62.81 GENERALIZED MUSCLE WEAKNESS: ICD-10-CM

## 2024-06-18 DIAGNOSIS — Z74.09 IMMOBILITY: ICD-10-CM

## 2024-06-18 DIAGNOSIS — R63.4 WEIGHT LOSS: ICD-10-CM

## 2024-06-18 DIAGNOSIS — F02.C18 SEVERE ALZHEIMER'S DEMENTIA WITH OTHER BEHAVIORAL DISTURBANCE, UNSPECIFIED TIMING OF DEMENTIA ONSET (MULTI): Primary | ICD-10-CM

## 2024-06-18 DIAGNOSIS — E55.9 VITAMIN D DEFICIENCY: ICD-10-CM

## 2024-06-18 DIAGNOSIS — R53.1 LEFT-SIDED WEAKNESS: ICD-10-CM

## 2024-06-18 DIAGNOSIS — I10 BENIGN ESSENTIAL HTN: ICD-10-CM

## 2024-06-18 DIAGNOSIS — I50.9 CHRONIC CONGESTIVE HEART FAILURE, UNSPECIFIED HEART FAILURE TYPE (MULTI): ICD-10-CM

## 2024-06-18 DIAGNOSIS — F33.9 EPISODE OF RECURRENT MAJOR DEPRESSIVE DISORDER, UNSPECIFIED DEPRESSION EPISODE SEVERITY (CMS-HCC): ICD-10-CM

## 2024-06-18 DIAGNOSIS — G30.9 SEVERE ALZHEIMER'S DEMENTIA WITH OTHER BEHAVIORAL DISTURBANCE, UNSPECIFIED TIMING OF DEMENTIA ONSET (MULTI): Primary | ICD-10-CM

## 2024-06-18 DIAGNOSIS — I69.30 SEQUELA OF CEREBROVASCULAR ACCIDENT: ICD-10-CM

## 2024-06-18 PROCEDURE — 99309 SBSQ NF CARE MODERATE MDM 30: CPT | Performed by: NURSE PRACTITIONER

## 2024-06-18 NOTE — LETTER
Patient: Shonda Solano  : 3/14/1934    Encounter Date: 2024    Name: Shonda Solano    : 3/14/1934    Code Status:  DNR-cc    Chief Complaint:  Follow up on Dementia ;  etc....    HPI   89 year old female patient at [facility] in Long Term Care.       Medical history includes: Difficulty in walking; muscle weakness (generalized); Acquired hammer toes on the right & left foot; Personal history of other infectious and parasitic diseases; disorientation; Alzheimer's disease; unspecified; symbolic dysfunctions; other speech and language deficits following unspecified cerebrovascular disease; mood disorder due to known physiological condition, unspecified; Bipolar disorder; Displaced intertrochanteric fracture of right femur; need for assistance with personal care; chronic kidney disease; primary osteoarthritis; unspecified ankle and foot; polyneuropathy, unspecified; unspecified abnormalities of gait and mobility; other osteoporosis without current pathological fracture.      Chronic comorbidities as follows:     Dementia; Alzheimer's disease; weight loss:   On donepezil.  Also follows up with in-house psych NP at .  No agitation reported.   Slight weight increase in April.   Recent weights as follows:  23 110.8 #  23 107.4 #  10/6/23 106.2 #  23 105.2 #  23 103 #  24 102.4 #  24 102.4 #  3/6/24 100 #  4/3/24 103.2 #  24 97.6 #  24 96.4 #   Also on house supplements and shakes TID.  Patient seen today, she is sitting up in her wheelchair.   Calm, cooperative, pleasantly confused.   Mentation at baseline.   A x O x 1.   No complaints of headaches or dizziness.  No complaints of chest pain or SOB    Major depressive disorder:  On mirtazapine 15 mg PO at bedtime.     Benign essential HTN; Chronic diastolic heart disease:   Recent /65  Not on any medications for HTN.   No increased edema or SOB reported.   No complaints of chest pain, SOB,  headaches or dizziness.  No complaints of headaches or dizziness.  No weight gain reported.                 Vitamin D deficiency:  On vitamin D 3 1000 units PO every day.  Recent vitamin D 25 hydroxy levels:  10/6/2023: Vitamin D 25-hydroxy level 32.58 within normal limits (range )   2023: Vitamin D 25-hydroxy level 33.84  2023 vitamin D hydroxy level 43 wnl  24 vitamin D hydroxy level 41 wnl  3/11/2024 vitamin D 25-hydroxy level: 35   24 vitamin D 25 hydroxy level 29 L  2024 vitamin D 25-hydroxy level 33                       Sequela of  CVA; left sided weakness; immobility; generalized muscle weakness:  Patient had a stroke in .  On  Plavix.  Requires full supportive care.   Very weak.  In wheelchair.                             Reviewed  EMR  Reviewed medical, social, surgical and family history.  Reviewed all current medications and performed medication reconciliation.  Performed prescription drug management.  Reviewed vital signs AND lab results  Reviewed [facility] Care Documentation  Discussed patient with nursing.       ROS: Limited ROS due to mentation; ROS negative unless noted in HPI.     Surgical History  Problems    · History of Femur fracture repair   · History of Tonsillectomy     Family History  Mother    · Family history of   Father    · Family history of      Social History   · Never a smoker  No alcohol  No illicit drugs  Lives in long term care facility              Past Medical History:   Diagnosis Date   • Other malaise 2020    Physical deconditioning   • Personal history of other diseases of the digestive system     History of constipation   • Personal history of other diseases of the nervous system and sense organs 2020    History of polyneuropathy   • Personal history of urinary (tract) infections     History of urinary tract infection   • Unspecified dementia, unspecified severity, without behavioral disturbance,  psychotic disturbance, mood disturbance, and anxiety (Multi) 12/10/2022    Dementia   • Unspecified diastolic (congestive) heart failure (Multi)     Diastolic heart failure   • Unspecified fracture of right femur, initial encounter for closed fracture (Multi)     Femur fracture, right             Lab Results   Component Value Date    WBC 9.1 08/12/2023    HGB 12.7 08/12/2023    HCT 41.6 08/12/2023     08/12/2023    ALT 8 08/12/2023    AST 14 08/12/2023     08/12/2023    K 4.1 08/12/2023     08/12/2023    CREATININE 0.81 08/12/2023    BUN 30 (H) 08/12/2023    CO2 21 08/12/2023    TSH 2.92 10/14/2020    INR 1.0 02/20/2022    HGBA1C 5.9 10/15/2020     Outside Labs  CBC: Date: 10/12/22, WBC: 4.8, Hgb: 11.9, Hct: 35.2, PLT: 290   CBC: Date: 7/7/22, WBC: 3.9, Hgb: 12.7, Hct: 38.5, PLT: 307   BMP: Date: 10/12/22, Na: 140, K: 4.3, Cl: 107, CO2: 25, BUN: 25, Cr: 0.6, Glu: 80, Ca: 9   BMP: Date: 7/7/22, Na: 141, K: 4.6, Cl: 105, CO2: 26, BUN: 30, Cr: 0.7, Glu: 113, Ca: 9.1   Hepatic Function Tests: Date: 3/8/22, AST: 15, ALT: 11, Alk Phos: 114, T Bili: 0.4, Albumin: 3.6   Hepatic Function Tests: Date: 3/9/21, AST: 15, ALT: 14, Alk Phos: 101, T Bili: 0.6, Albumin: 3.3   Lipid panel on 10/13/20 as follows: Cholesterol 186; triglyceride 84; HDL 51; ; VLDL 17.    Vitamin D 25 hydroxy level on 10/13/20 22 L    BMP: Date: 1/10/2023 Na 142 K 4.4 Cr 0.7 BUN 27 glucose 77 Ca 9.2 GFR 79  CBC: Date: 1/10/2023 WBC 6.9 Hgb 12.8 hematocrit 38.4 platelets 398  Liver function: Date: 1/10/2023 ALT 11 AST 18 alkaline phos.  112 bilirubin 0.5 albumin 3.7 protein 6.4    BMP: Date: 4/18/2023 Na 145 K 4 Cr 0.7 BUN 25 glucose 85 Ca 8.8 GFR 79  CBC: Date: 4/18/2023 WBC 5.2 Hgb 12.6 hematocrit 38.3 platelets 298  Liver function: Date: 4/18/2023 ALT 6 AST 11 alkaline phos.  94 bilirubin 0.5 albumin 3.4 protein 5.7   4/18/23 vitamin D 25 hydroxy level 19.62 L    BMP: Date: 8/8/2023 Na 142 K 4.9 Cr 0.6 BUN 24 glucose 65 Ca 9.2  GFR 94  CBC: Date: 8/8/2023 WBC 5.4 Hgb 12.8 hematocrit 40.4 platelets 398  Liver function: Date: 8/8/2023 ALT 8 AST 18 alkaline phos.  108 bilirubin 0.6 albumin 3.7 protein 6.4    8/8/23 vitamin D hydroxy level 29.92 L    BMP: Date: 10/6/2023 Na 142 K 4.5 Cr 0.7 BUN 28 glucose 86 Ca 9.8 GFR 79  CBC: Date: 10/6/2023 WBC 6.2 Hgb 12.7 hematocrit 39.3 platelets 358    10/6/2023: TSH 2.652 within normal limits (range 0.340-5.500)    10/6/2023: Vitamin D 25-hydroxy level 32.58 within normal limits (range )    11/2/2023: Vitamin D 25-hydroxy level 33.84    BMP: Date: 12/4/2023 Na 143 K 4.3 Cr 0.6 BUN 23 glucose 76 Ca 9.8 GFR 94  Liver function: date: 12/4/2023 ALT 10 AST 17 alkaline phos.  112 bilirubin 0.7 albumin 3.8 protein 6.5    12/4/2023 vitamin D 25-hydroxy level 56 wnl    BMP: Date: 12/11/2023 Na 140 K 4.9 Cr 0.6 BUN 21 glucose 55 Ca 9.1 GFR 94  CBC: Date: 12/13/2023 WBC 5.9 Hgb 12.6 hematocrit 39.32 platelets 364  Liver function: Date: 12/11/2023 ALT 8 AST 15 alkaline phos.  97 bilirubin 0.5 albumin 3.3 protein 5.6    12/11/2023 vitamin D hydroxy level 43 wnl    BMP: Date: 1/29/2024 Na 142 K 4.1 Cr 0.7 BUN 23 glucose 81 Ca 9.7 GFR 79  CBC: Date: 1/29/2024 WBC 6.7 Hgb 13.1 hematocrit 40.6 platelets 368  Liver function: Date: 1/29/2024 ALT 14 AST 21 alkaline phos.  131 bilirubin 0.8 albumin 4 protein 6.7    BMP: Date: 3/11/2024 Na 140 K 4.1 Cr 0.6 BUN 23 glucose 129 Ca 9.4 GFR 94  CBC: Date: 3/11/2024 WBC 6.3 Hgb 12.7 hematocrit 39 platelets 381  Liver function: Date: 3/11/2024 ALT 9 AST 14 alkaline phos.  119 bilirubin 0.5 albumin 3.5 protein 6.1    3/11/2024 vitamin D 25-hydroxy level: 35 ( )    BMP: Date: 4/8/2024 Na 143 K 4.1 Cr 0.7 BUN 28 glucose 110 Ca 10.2 GFR 95  Liver function: Date: 4/8/2024 ALT 11 AST 22 alkaline phos.  134 bilirubin 1 albumin 4.1 protein 7.2    BMP: Date: 4/18/2024 Na 141 K 4.2 Cr 0.5 BUN 22 glucose 78 Ca 9.6   CBC: Date: 4/18/2024 WBC 7.8 Hgb 13.9 hematocrit 42.9  "platelets 444  Liver function: Date: 4/18/2024 ALT 9 AST 15 alkaline phos.  111 bilirubin 0.5 albumin 3.6 protein 6.1    4/18/2024 vitamin D 25-hydroxy level 35 (range )    BMP: Date: 4/22/2024 Na 144 K 4.3 Cr 0.5 BUN 18 glucose 97 Ca 9   CBC: Date: 4/22/2024 WBC 6.5 Hgb 13.4 hematocrit 40.7 platelets 442  Liver function: Date: 4/22/2024 ALT 7 AST 13 alkaline phos.  98 bilirubin 0.6 albumin 3.3 protein 6    4/22/24 vitamin D 25 hydroxy level 34    BMP: Date: 6/17/2024 Na 143 K 4.3 Cr 0.5 BUN 20 glucose 61 Ca 9.1   CBC: Date: 6/17/2024 WBC 5.3 Hgb 12.8 hematocrit 35.1 platelets 316  Liver function: Date: 6/17/2024 ALT 6 AST 14 alkaline phos.  101 bilirubin 0.4 albumin 3.3 protein 5.6    6/17/2024 vitamin D 25-hydroxy level 33        /65   Pulse 66   Temp 36.4 °C (97.5 °F)   Resp 18   Ht 1.575 m (5' 2\")   Wt (!) 43.7 kg (96 lb 6.4 oz)   SpO2 96%   BMI 17.63 kg/m²      Physical Exam  Vitals and nursing note reviewed.   Constitutional:       Appearance: Normal appearance.   HENT:      Head: Normocephalic.      Right Ear: External ear normal.      Left Ear: External ear normal.      Nose: Nose normal.      Mouth/Throat:      Mouth: Mucous membranes are moist.      Pharynx: Oropharynx is clear.   Eyes:      Extraocular Movements: Extraocular movements intact.      Conjunctiva/sclera: Conjunctivae normal.      Pupils: Pupils are equal, round, and reactive to light.   Cardiovascular:      Rate and Rhythm: Normal rate and regular rhythm.      Pulses: Normal pulses.      Heart sounds: Normal heart sounds.   Pulmonary:      Effort: Pulmonary effort is normal.      Breath sounds: Normal breath sounds.   Abdominal:      General: Bowel sounds are normal.      Palpations: Abdomen is soft.   Musculoskeletal:         General: Normal range of motion.      Cervical back: Normal range of motion and neck supple.      Comments: Generalized muscle weakness; impaired gait and mobility   Skin:     General: " Skin is warm and dry.   Neurological:      Mental Status: She is alert. Mental status is at baseline. She is disoriented.      Motor: Weakness present.      Coordination: Coordination abnormal.      Gait: Gait abnormal.   Psychiatric:         Mood and Affect: Affect is inappropriate.         Speech: Speech is tangential.         Cognition and Memory: Cognition is impaired. Memory is impaired.      Comments: Dementia   A x O x 1          Assessment/Plan    Dementia; Alzheimer's disease; weight loss:   Continue donepezil.  Monitor weights.  Continue house supplements and shakes TID.          Major depressive disorder:         Continue mirtazapine 15 mg PO at bedtime.              Benign essential HTN; Chronic diastolic heart disease:   Monitor Bps.                Vitamin D deficiency:  Continue vitamin D 3 1000 units PO every day.          Sequela of  CVA; left sided weakness; immobility; generalized muscle weakness:  Patient had a stroke in October, 2020.  Continue Plavix.  Requires full supportive care.   Continue to use wheelchair.  Fall prevention strategies.       Problem List Items Addressed This Visit          Neuro    Dementia (Multi) - Primary     Other Visit Diagnoses       Weight loss        Episode of recurrent major depressive disorder, unspecified depression episode severity (CMS-HCC)        Benign essential HTN        Chronic congestive heart failure, unspecified heart failure type (Multi)        Vitamin D deficiency        Sequela of cerebrovascular accident        Left-sided weakness        Immobility        Generalized muscle weakness                  YIMI Jordan       Electronically Signed By: YIMI Jordan   6/22/24  6:15 PM

## 2024-06-22 VITALS
HEART RATE: 66 BPM | DIASTOLIC BLOOD PRESSURE: 65 MMHG | TEMPERATURE: 97.5 F | SYSTOLIC BLOOD PRESSURE: 123 MMHG | RESPIRATION RATE: 18 BRPM | OXYGEN SATURATION: 96 % | HEIGHT: 62 IN | WEIGHT: 96.4 LBS | BODY MASS INDEX: 17.74 KG/M2

## 2024-06-22 NOTE — PROGRESS NOTES
Name: Shonda Solano    : 3/14/1934    Code Status:  DNR-cc    Chief Complaint:  Follow up on Dementia ;  etc....    HPI   89 year old female patient at [facility] in Long Term Care.       Medical history includes: Difficulty in walking; muscle weakness (generalized); Acquired hammer toes on the right & left foot; Personal history of other infectious and parasitic diseases; disorientation; Alzheimer's disease; unspecified; symbolic dysfunctions; other speech and language deficits following unspecified cerebrovascular disease; mood disorder due to known physiological condition, unspecified; Bipolar disorder; Displaced intertrochanteric fracture of right femur; need for assistance with personal care; chronic kidney disease; primary osteoarthritis; unspecified ankle and foot; polyneuropathy, unspecified; unspecified abnormalities of gait and mobility; other osteoporosis without current pathological fracture.      Chronic comorbidities as follows:     Dementia; Alzheimer's disease; weight loss:   On donepezil.  Also follows up with in-house psych NP at .  No agitation reported.   Slight weight increase in April.   Recent weights as follows:  23 110.8 #  23 107.4 #  10/6/23 106.2 #  23 105.2 #  23 103 #  24 102.4 #  24 102.4 #  3/6/24 100 #  4/3/24 103.2 #  24 97.6 #  24 96.4 #   Also on house supplements and shakes TID.  Patient seen today, she is sitting up in her wheelchair.   Calm, cooperative, pleasantly confused.   Mentation at baseline.   A x O x 1.   No complaints of headaches or dizziness.  No complaints of chest pain or SOB    Major depressive disorder:  On mirtazapine 15 mg PO at bedtime.     Benign essential HTN; Chronic diastolic heart disease:   Recent /65  Not on any medications for HTN.   No increased edema or SOB reported.   No complaints of chest pain, SOB, headaches or dizziness.  No complaints of headaches or dizziness.  No weight  gain reported.                 Vitamin D deficiency:  On vitamin D 3 1000 units PO every day.  Recent vitamin D 25 hydroxy levels:  10/6/2023: Vitamin D 25-hydroxy level 32.58 within normal limits (range )   2023: Vitamin D 25-hydroxy level 33.84  2023 vitamin D hydroxy level 43 wnl  24 vitamin D hydroxy level 41 wnl  3/11/2024 vitamin D 25-hydroxy level: 35   24 vitamin D 25 hydroxy level 29 L  2024 vitamin D 25-hydroxy level 33                       Sequela of  CVA; left sided weakness; immobility; generalized muscle weakness:  Patient had a stroke in .  On  Plavix.  Requires full supportive care.   Very weak.  In wheelchair.                             Reviewed  EMR  Reviewed medical, social, surgical and family history.  Reviewed all current medications and performed medication reconciliation.  Performed prescription drug management.  Reviewed vital signs AND lab results  Reviewed [facility] Care Documentation  Discussed patient with nursing.       ROS: Limited ROS due to mentation; ROS negative unless noted in HPI.     Surgical History  Problems    · History of Femur fracture repair   · History of Tonsillectomy     Family History  Mother    · Family history of   Father    · Family history of      Social History   · Never a smoker  No alcohol  No illicit drugs  Lives in long term care facility              Past Medical History:   Diagnosis Date    Other malaise 2020    Physical deconditioning    Personal history of other diseases of the digestive system     History of constipation    Personal history of other diseases of the nervous system and sense organs 2020    History of polyneuropathy    Personal history of urinary (tract) infections     History of urinary tract infection    Unspecified dementia, unspecified severity, without behavioral disturbance, psychotic disturbance, mood disturbance, and anxiety (Multi) 12/10/2022    Dementia     Unspecified diastolic (congestive) heart failure (Multi)     Diastolic heart failure    Unspecified fracture of right femur, initial encounter for closed fracture (Multi)     Femur fracture, right             Lab Results   Component Value Date    WBC 9.1 08/12/2023    HGB 12.7 08/12/2023    HCT 41.6 08/12/2023     08/12/2023    ALT 8 08/12/2023    AST 14 08/12/2023     08/12/2023    K 4.1 08/12/2023     08/12/2023    CREATININE 0.81 08/12/2023    BUN 30 (H) 08/12/2023    CO2 21 08/12/2023    TSH 2.92 10/14/2020    INR 1.0 02/20/2022    HGBA1C 5.9 10/15/2020     Outside Labs  CBC: Date: 10/12/22, WBC: 4.8, Hgb: 11.9, Hct: 35.2, PLT: 290   CBC: Date: 7/7/22, WBC: 3.9, Hgb: 12.7, Hct: 38.5, PLT: 307   BMP: Date: 10/12/22, Na: 140, K: 4.3, Cl: 107, CO2: 25, BUN: 25, Cr: 0.6, Glu: 80, Ca: 9   BMP: Date: 7/7/22, Na: 141, K: 4.6, Cl: 105, CO2: 26, BUN: 30, Cr: 0.7, Glu: 113, Ca: 9.1   Hepatic Function Tests: Date: 3/8/22, AST: 15, ALT: 11, Alk Phos: 114, T Bili: 0.4, Albumin: 3.6   Hepatic Function Tests: Date: 3/9/21, AST: 15, ALT: 14, Alk Phos: 101, T Bili: 0.6, Albumin: 3.3   Lipid panel on 10/13/20 as follows: Cholesterol 186; triglyceride 84; HDL 51; ; VLDL 17.    Vitamin D 25 hydroxy level on 10/13/20 22 L    BMP: Date: 1/10/2023 Na 142 K 4.4 Cr 0.7 BUN 27 glucose 77 Ca 9.2 GFR 79  CBC: Date: 1/10/2023 WBC 6.9 Hgb 12.8 hematocrit 38.4 platelets 398  Liver function: Date: 1/10/2023 ALT 11 AST 18 alkaline phos.  112 bilirubin 0.5 albumin 3.7 protein 6.4    BMP: Date: 4/18/2023 Na 145 K 4 Cr 0.7 BUN 25 glucose 85 Ca 8.8 GFR 79  CBC: Date: 4/18/2023 WBC 5.2 Hgb 12.6 hematocrit 38.3 platelets 298  Liver function: Date: 4/18/2023 ALT 6 AST 11 alkaline phos.  94 bilirubin 0.5 albumin 3.4 protein 5.7   4/18/23 vitamin D 25 hydroxy level 19.62 L    BMP: Date: 8/8/2023 Na 142 K 4.9 Cr 0.6 BUN 24 glucose 65 Ca 9.2 GFR 94  CBC: Date: 8/8/2023 WBC 5.4 Hgb 12.8 hematocrit 40.4 platelets 398  Liver  function: Date: 8/8/2023 ALT 8 AST 18 alkaline phos.  108 bilirubin 0.6 albumin 3.7 protein 6.4    8/8/23 vitamin D hydroxy level 29.92 L    BMP: Date: 10/6/2023 Na 142 K 4.5 Cr 0.7 BUN 28 glucose 86 Ca 9.8 GFR 79  CBC: Date: 10/6/2023 WBC 6.2 Hgb 12.7 hematocrit 39.3 platelets 358    10/6/2023: TSH 2.652 within normal limits (range 0.340-5.500)    10/6/2023: Vitamin D 25-hydroxy level 32.58 within normal limits (range )    11/2/2023: Vitamin D 25-hydroxy level 33.84    BMP: Date: 12/4/2023 Na 143 K 4.3 Cr 0.6 BUN 23 glucose 76 Ca 9.8 GFR 94  Liver function: date: 12/4/2023 ALT 10 AST 17 alkaline phos.  112 bilirubin 0.7 albumin 3.8 protein 6.5    12/4/2023 vitamin D 25-hydroxy level 56 wnl    BMP: Date: 12/11/2023 Na 140 K 4.9 Cr 0.6 BUN 21 glucose 55 Ca 9.1 GFR 94  CBC: Date: 12/13/2023 WBC 5.9 Hgb 12.6 hematocrit 39.32 platelets 364  Liver function: Date: 12/11/2023 ALT 8 AST 15 alkaline phos.  97 bilirubin 0.5 albumin 3.3 protein 5.6    12/11/2023 vitamin D hydroxy level 43 wnl    BMP: Date: 1/29/2024 Na 142 K 4.1 Cr 0.7 BUN 23 glucose 81 Ca 9.7 GFR 79  CBC: Date: 1/29/2024 WBC 6.7 Hgb 13.1 hematocrit 40.6 platelets 368  Liver function: Date: 1/29/2024 ALT 14 AST 21 alkaline phos.  131 bilirubin 0.8 albumin 4 protein 6.7    BMP: Date: 3/11/2024 Na 140 K 4.1 Cr 0.6 BUN 23 glucose 129 Ca 9.4 GFR 94  CBC: Date: 3/11/2024 WBC 6.3 Hgb 12.7 hematocrit 39 platelets 381  Liver function: Date: 3/11/2024 ALT 9 AST 14 alkaline phos.  119 bilirubin 0.5 albumin 3.5 protein 6.1    3/11/2024 vitamin D 25-hydroxy level: 35 ( )    BMP: Date: 4/8/2024 Na 143 K 4.1 Cr 0.7 BUN 28 glucose 110 Ca 10.2 GFR 95  Liver function: Date: 4/8/2024 ALT 11 AST 22 alkaline phos.  134 bilirubin 1 albumin 4.1 protein 7.2    BMP: Date: 4/18/2024 Na 141 K 4.2 Cr 0.5 BUN 22 glucose 78 Ca 9.6   CBC: Date: 4/18/2024 WBC 7.8 Hgb 13.9 hematocrit 42.9 platelets 444  Liver function: Date: 4/18/2024 ALT 9 AST 15 alkaline phos.  111  "bilirubin 0.5 albumin 3.6 protein 6.1    4/18/2024 vitamin D 25-hydroxy level 35 (range )    BMP: Date: 4/22/2024 Na 144 K 4.3 Cr 0.5 BUN 18 glucose 97 Ca 9   CBC: Date: 4/22/2024 WBC 6.5 Hgb 13.4 hematocrit 40.7 platelets 442  Liver function: Date: 4/22/2024 ALT 7 AST 13 alkaline phos.  98 bilirubin 0.6 albumin 3.3 protein 6    4/22/24 vitamin D 25 hydroxy level 34    BMP: Date: 6/17/2024 Na 143 K 4.3 Cr 0.5 BUN 20 glucose 61 Ca 9.1   CBC: Date: 6/17/2024 WBC 5.3 Hgb 12.8 hematocrit 35.1 platelets 316  Liver function: Date: 6/17/2024 ALT 6 AST 14 alkaline phos.  101 bilirubin 0.4 albumin 3.3 protein 5.6    6/17/2024 vitamin D 25-hydroxy level 33        /65   Pulse 66   Temp 36.4 °C (97.5 °F)   Resp 18   Ht 1.575 m (5' 2\")   Wt (!) 43.7 kg (96 lb 6.4 oz)   SpO2 96%   BMI 17.63 kg/m²      Physical Exam  Vitals and nursing note reviewed.   Constitutional:       Appearance: Normal appearance.   HENT:      Head: Normocephalic.      Right Ear: External ear normal.      Left Ear: External ear normal.      Nose: Nose normal.      Mouth/Throat:      Mouth: Mucous membranes are moist.      Pharynx: Oropharynx is clear.   Eyes:      Extraocular Movements: Extraocular movements intact.      Conjunctiva/sclera: Conjunctivae normal.      Pupils: Pupils are equal, round, and reactive to light.   Cardiovascular:      Rate and Rhythm: Normal rate and regular rhythm.      Pulses: Normal pulses.      Heart sounds: Normal heart sounds.   Pulmonary:      Effort: Pulmonary effort is normal.      Breath sounds: Normal breath sounds.   Abdominal:      General: Bowel sounds are normal.      Palpations: Abdomen is soft.   Musculoskeletal:         General: Normal range of motion.      Cervical back: Normal range of motion and neck supple.      Comments: Generalized muscle weakness; impaired gait and mobility   Skin:     General: Skin is warm and dry.   Neurological:      Mental Status: She is alert. Mental " status is at baseline. She is disoriented.      Motor: Weakness present.      Coordination: Coordination abnormal.      Gait: Gait abnormal.   Psychiatric:         Mood and Affect: Affect is inappropriate.         Speech: Speech is tangential.         Cognition and Memory: Cognition is impaired. Memory is impaired.      Comments: Dementia   A x O x 1          Assessment/Plan     Dementia; Alzheimer's disease; weight loss:   Continue donepezil.  Monitor weights.  Continue house supplements and shakes TID.          Major depressive disorder:         Continue mirtazapine 15 mg PO at bedtime.              Benign essential HTN; Chronic diastolic heart disease:   Monitor Bps.                Vitamin D deficiency:  Continue vitamin D 3 1000 units PO every day.          Sequela of  CVA; left sided weakness; immobility; generalized muscle weakness:  Patient had a stroke in October, 2020.  Continue Plavix.  Requires full supportive care.   Continue to use wheelchair.  Fall prevention strategies.       Problem List Items Addressed This Visit          Neuro    Dementia (Multi) - Primary     Other Visit Diagnoses       Weight loss        Episode of recurrent major depressive disorder, unspecified depression episode severity (CMS-HCC)        Benign essential HTN        Chronic congestive heart failure, unspecified heart failure type (Multi)        Vitamin D deficiency        Sequela of cerebrovascular accident        Left-sided weakness        Immobility        Generalized muscle weakness                  Sandi Rudolph, APRN-CNP

## 2024-07-11 ENCOUNTER — NURSING HOME VISIT (OUTPATIENT)
Dept: POST ACUTE CARE | Facility: EXTERNAL LOCATION | Age: 89
End: 2024-07-11
Payer: COMMERCIAL

## 2024-07-11 DIAGNOSIS — M62.81 GENERALIZED MUSCLE WEAKNESS: ICD-10-CM

## 2024-07-11 DIAGNOSIS — F02.C18 SEVERE ALZHEIMER'S DEMENTIA WITH OTHER BEHAVIORAL DISTURBANCE, UNSPECIFIED TIMING OF DEMENTIA ONSET (MULTI): Primary | ICD-10-CM

## 2024-07-11 DIAGNOSIS — R53.1 LEFT-SIDED WEAKNESS: ICD-10-CM

## 2024-07-11 DIAGNOSIS — I69.30 SEQUELA OF CEREBROVASCULAR ACCIDENT: ICD-10-CM

## 2024-07-11 DIAGNOSIS — R63.4 WEIGHT LOSS: ICD-10-CM

## 2024-07-11 DIAGNOSIS — Z74.09 IMMOBILITY: ICD-10-CM

## 2024-07-11 DIAGNOSIS — G30.9 SEVERE ALZHEIMER'S DEMENTIA WITH OTHER BEHAVIORAL DISTURBANCE, UNSPECIFIED TIMING OF DEMENTIA ONSET (MULTI): Primary | ICD-10-CM

## 2024-07-11 PROCEDURE — 99308 SBSQ NF CARE LOW MDM 20: CPT | Performed by: NURSE PRACTITIONER

## 2024-07-11 NOTE — LETTER
Patient: Shonda Solano  : 3/14/1934    Encounter Date: 2024    Name: Shonda Solano    : 3/14/1934    Code Status:  DNR-cc    Chief Complaint:  Follow up on Alzheimer's dementia ;  etc....    HPI   89 year old female patient at [facility] in Long Term Care.       Medical history includes: Difficulty in walking; muscle weakness (generalized); Acquired hammer toes on the right & left foot; Personal history of other infectious and parasitic diseases; disorientation; Alzheimer's disease; unspecified; symbolic dysfunctions; other speech and language deficits following unspecified cerebrovascular disease; mood disorder due to known physiological condition, unspecified; Bipolar disorder; Displaced intertrochanteric fracture of right femur; need for assistance with personal care; chronic kidney disease; primary osteoarthritis; unspecified ankle and foot; polyneuropathy, unspecified; unspecified abnormalities of gait and mobility; other osteoporosis without current pathological fracture.      Chronic comorbidities as follows:     Alzheimer's disease; Dementia; weight loss:   On donepezil.  Also follows up with in-house psych NP at .  No agitation reported.   Weights are trending down.  Recent weights as follows:  23 110.8 #  23 107.4 #  10/6/23 106.2 #  23 105.2 #  23 103 #  24 102.4 #  24 102.4 #  3/6/24 100 #  4/3/24 103.2 #  24 97.6 #  24 96.4 #  7/3/24 97.5 #   Also on house supplements and shakes TID.  Patient seen today, she is sitting up in her wheelchair.   Calm, cooperative, pleasantly confused.   Mentation at baseline.   A x O x 1.   No complaints of headaches or dizziness.  No complaints of chest pain or SOB          Sequela of  CVA; left sided weakness; immobility; generalized muscle weakness:  Patient had a stroke in .  On  Plavix.  Requires full supportive care.   Very weak.  In wheelchair.                              Reviewed  EMR  Reviewed medical, social, surgical and family history.  Reviewed all current medications and performed medication reconciliation.  Performed prescription drug management.  Reviewed vital signs AND lab results  Reviewed [facility] Documentation  Discussed patient with nursing.       ROS: Limited ROS due to mentation; ROS negative unless noted in HPI.     Surgical History  Problems    · History of Femur fracture repair   · History of Tonsillectomy     Family History  Mother    · Family history of   Father    · Family history of      Social History   · Never a smoker  No alcohol  No illicit drugs  Lives in long term care facility              Past Medical History:   Diagnosis Date   • Other malaise 2020    Physical deconditioning   • Personal history of other diseases of the digestive system     History of constipation   • Personal history of other diseases of the nervous system and sense organs 2020    History of polyneuropathy   • Personal history of urinary (tract) infections     History of urinary tract infection   • Unspecified dementia, unspecified severity, without behavioral disturbance, psychotic disturbance, mood disturbance, and anxiety (Multi) 12/10/2022    Dementia   • Unspecified diastolic (congestive) heart failure (Multi)     Diastolic heart failure   • Unspecified fracture of right femur, initial encounter for closed fracture (Multi)     Femur fracture, right             Lab Results   Component Value Date    WBC 9.1 2023    HGB 12.7 2023    HCT 41.6 2023     2023    ALT 8 2023    AST 14 2023     2023    K 4.1 2023     2023    CREATININE 0.81 2023    BUN 30 (H) 2023    CO2 21 2023    TSH 2.92 10/14/2020    INR 1.0 2022    HGBA1C 5.9 10/15/2020     Outside Labs  CBC: Date: 10/12/22, WBC: 4.8, Hgb: 11.9, Hct: 35.2, PLT: 290   CBC: Date: 22,  WBC: 3.9, Hgb: 12.7, Hct: 38.5, PLT: 307   BMP: Date: 10/12/22, Na: 140, K: 4.3, Cl: 107, CO2: 25, BUN: 25, Cr: 0.6, Glu: 80, Ca: 9   BMP: Date: 7/7/22, Na: 141, K: 4.6, Cl: 105, CO2: 26, BUN: 30, Cr: 0.7, Glu: 113, Ca: 9.1   Hepatic Function Tests: Date: 3/8/22, AST: 15, ALT: 11, Alk Phos: 114, T Bili: 0.4, Albumin: 3.6   Hepatic Function Tests: Date: 3/9/21, AST: 15, ALT: 14, Alk Phos: 101, T Bili: 0.6, Albumin: 3.3   Lipid panel on 10/13/20 as follows: Cholesterol 186; triglyceride 84; HDL 51; ; VLDL 17.    Vitamin D 25 hydroxy level on 10/13/20 22 L    BMP: Date: 1/10/2023 Na 142 K 4.4 Cr 0.7 BUN 27 glucose 77 Ca 9.2 GFR 79  CBC: Date: 1/10/2023 WBC 6.9 Hgb 12.8 hematocrit 38.4 platelets 398  Liver function: Date: 1/10/2023 ALT 11 AST 18 alkaline phos.  112 bilirubin 0.5 albumin 3.7 protein 6.4    BMP: Date: 4/18/2023 Na 145 K 4 Cr 0.7 BUN 25 glucose 85 Ca 8.8 GFR 79  CBC: Date: 4/18/2023 WBC 5.2 Hgb 12.6 hematocrit 38.3 platelets 298  Liver function: Date: 4/18/2023 ALT 6 AST 11 alkaline phos.  94 bilirubin 0.5 albumin 3.4 protein 5.7   4/18/23 vitamin D 25 hydroxy level 19.62 L    BMP: Date: 8/8/2023 Na 142 K 4.9 Cr 0.6 BUN 24 glucose 65 Ca 9.2 GFR 94  CBC: Date: 8/8/2023 WBC 5.4 Hgb 12.8 hematocrit 40.4 platelets 398  Liver function: Date: 8/8/2023 ALT 8 AST 18 alkaline phos.  108 bilirubin 0.6 albumin 3.7 protein 6.4    8/8/23 vitamin D hydroxy level 29.92 L    BMP: Date: 10/6/2023 Na 142 K 4.5 Cr 0.7 BUN 28 glucose 86 Ca 9.8 GFR 79  CBC: Date: 10/6/2023 WBC 6.2 Hgb 12.7 hematocrit 39.3 platelets 358    10/6/2023: TSH 2.652 within normal limits (range 0.340-5.500)    10/6/2023: Vitamin D 25-hydroxy level 32.58 within normal limits (range )    11/2/2023: Vitamin D 25-hydroxy level 33.84    BMP: Date: 12/4/2023 Na 143 K 4.3 Cr 0.6 BUN 23 glucose 76 Ca 9.8 GFR 94  Liver function: date: 12/4/2023 ALT 10 AST 17 alkaline phos.  112 bilirubin 0.7 albumin 3.8 protein 6.5    12/4/2023 vitamin D  25-hydroxy level 56 wnl    BMP: Date: 12/11/2023 Na 140 K 4.9 Cr 0.6 BUN 21 glucose 55 Ca 9.1 GFR 94  CBC: Date: 12/13/2023 WBC 5.9 Hgb 12.6 hematocrit 39.32 platelets 364  Liver function: Date: 12/11/2023 ALT 8 AST 15 alkaline phos.  97 bilirubin 0.5 albumin 3.3 protein 5.6    12/11/2023 vitamin D hydroxy level 43 wnl    BMP: Date: 1/29/2024 Na 142 K 4.1 Cr 0.7 BUN 23 glucose 81 Ca 9.7 GFR 79  CBC: Date: 1/29/2024 WBC 6.7 Hgb 13.1 hematocrit 40.6 platelets 368  Liver function: Date: 1/29/2024 ALT 14 AST 21 alkaline phos.  131 bilirubin 0.8 albumin 4 protein 6.7    BMP: Date: 3/11/2024 Na 140 K 4.1 Cr 0.6 BUN 23 glucose 129 Ca 9.4 GFR 94  CBC: Date: 3/11/2024 WBC 6.3 Hgb 12.7 hematocrit 39 platelets 381  Liver function: Date: 3/11/2024 ALT 9 AST 14 alkaline phos.  119 bilirubin 0.5 albumin 3.5 protein 6.1    3/11/2024 vitamin D 25-hydroxy level: 35 ( )    BMP: Date: 4/8/2024 Na 143 K 4.1 Cr 0.7 BUN 28 glucose 110 Ca 10.2 GFR 95  Liver function: Date: 4/8/2024 ALT 11 AST 22 alkaline phos.  134 bilirubin 1 albumin 4.1 protein 7.2    BMP: Date: 4/18/2024 Na 141 K 4.2 Cr 0.5 BUN 22 glucose 78 Ca 9.6   CBC: Date: 4/18/2024 WBC 7.8 Hgb 13.9 hematocrit 42.9 platelets 444  Liver function: Date: 4/18/2024 ALT 9 AST 15 alkaline phos.  111 bilirubin 0.5 albumin 3.6 protein 6.1    4/18/2024 vitamin D 25-hydroxy level 35 (range )    BMP: Date: 4/22/2024 Na 144 K 4.3 Cr 0.5 BUN 18 glucose 97 Ca 9   CBC: Date: 4/22/2024 WBC 6.5 Hgb 13.4 hematocrit 40.7 platelets 442  Liver function: Date: 4/22/2024 ALT 7 AST 13 alkaline phos.  98 bilirubin 0.6 albumin 3.3 protein 6    4/22/24 vitamin D 25 hydroxy level 34    BMP: Date: 6/17/2024 Na 143 K 4.3 Cr 0.5 BUN 20 glucose 61 Ca 9.1   CBC: Date: 6/17/2024 WBC 5.3 Hgb 12.8 hematocrit 35.1 platelets 316  Liver function: Date: 6/17/2024 ALT 6 AST 14 alkaline phos.  101 bilirubin 0.4 albumin 3.3 protein 5.6    6/17/2024 vitamin D 25-hydroxy level 33        BP  "133/76   Pulse 70   Temp 36.4 °C (97.5 °F)   Resp 18   Ht 1.575 m (5' 2\")   Wt (!) 44.2 kg (97 lb 8 oz)   SpO2 96%   BMI 17.83 kg/m²      Physical Exam  Vitals and nursing note reviewed.   Constitutional:       Appearance: Normal appearance.   HENT:      Head: Normocephalic.      Right Ear: External ear normal.      Left Ear: External ear normal.      Nose: Nose normal.      Mouth/Throat:      Mouth: Mucous membranes are moist.      Pharynx: Oropharynx is clear.   Eyes:      Extraocular Movements: Extraocular movements intact.      Conjunctiva/sclera: Conjunctivae normal.      Pupils: Pupils are equal, round, and reactive to light.   Cardiovascular:      Rate and Rhythm: Normal rate and regular rhythm.      Pulses: Normal pulses.      Heart sounds: Normal heart sounds.   Pulmonary:      Effort: Pulmonary effort is normal.      Breath sounds: Normal breath sounds.   Abdominal:      General: Bowel sounds are normal.      Palpations: Abdomen is soft.   Musculoskeletal:         General: Normal range of motion.      Cervical back: Normal range of motion and neck supple.      Comments: Generalized muscle weakness; impaired gait and mobility   Skin:     General: Skin is warm and dry.   Neurological:      Mental Status: She is alert. Mental status is at baseline. She is disoriented.      Motor: Weakness present.      Coordination: Coordination abnormal.      Gait: Gait abnormal.   Psychiatric:         Mood and Affect: Affect is inappropriate.         Speech: Speech is tangential.         Cognition and Memory: Cognition is impaired. Memory is impaired.      Comments: Dementia   A x O x 1          Assessment/Plan    Dementia; Alzheimer's disease; weight loss:   Continue donepezil.  Monitor weights.  Continue house supplements and shakes TID.                Sequela of  CVA; left sided weakness; immobility; generalized muscle weakness:  Patient had a stroke in October, 2020.  Continue Plavix.  Requires full supportive " care.   Continue to use wheelchair.  Fall prevention strategies.       Problem List Items Addressed This Visit          Neuro    Dementia (Multi) - Primary     Other Visit Diagnoses       Weight loss        Sequela of cerebrovascular accident        Left-sided weakness        Immobility        Generalized muscle weakness                    YIMI Jordan       Electronically Signed By: YIMI Jordan   7/14/24 11:08 PM

## 2024-07-14 VITALS
SYSTOLIC BLOOD PRESSURE: 133 MMHG | BODY MASS INDEX: 17.94 KG/M2 | OXYGEN SATURATION: 96 % | HEIGHT: 62 IN | WEIGHT: 97.5 LBS | RESPIRATION RATE: 18 BRPM | TEMPERATURE: 97.5 F | HEART RATE: 70 BPM | DIASTOLIC BLOOD PRESSURE: 76 MMHG

## 2024-07-15 NOTE — PROGRESS NOTES
Name: Shonda Solano    : 3/14/1934    Code Status:  DNR-cc    Chief Complaint:  Follow up on Alzheimer's dementia ;  etc....    HPI   89 year old female patient at [facility] in Long Term Care.       Medical history includes: Difficulty in walking; muscle weakness (generalized); Acquired hammer toes on the right & left foot; Personal history of other infectious and parasitic diseases; disorientation; Alzheimer's disease; unspecified; symbolic dysfunctions; other speech and language deficits following unspecified cerebrovascular disease; mood disorder due to known physiological condition, unspecified; Bipolar disorder; Displaced intertrochanteric fracture of right femur; need for assistance with personal care; chronic kidney disease; primary osteoarthritis; unspecified ankle and foot; polyneuropathy, unspecified; unspecified abnormalities of gait and mobility; other osteoporosis without current pathological fracture.      Chronic comorbidities as follows:     Alzheimer's disease; Dementia; weight loss:   On donepezil.  Also follows up with in-house psych NP at .  No agitation reported.   Weights are trending down.  Recent weights as follows:  23 110.8 #  23 107.4 #  10/6/23 106.2 #  23 105.2 #  23 103 #  24 102.4 #  24 102.4 #  3/6/24 100 #  4/3/24 103.2 #  24 97.6 #  24 96.4 #  7/3/24 97.5 #   Also on house supplements and shakes TID.  Patient seen today, she is sitting up in her wheelchair.   Calm, cooperative, pleasantly confused.   Mentation at baseline.   A x O x 1.   No complaints of headaches or dizziness.  No complaints of chest pain or SOB          Sequela of  CVA; left sided weakness; immobility; generalized muscle weakness:  Patient had a stroke in .  On  Plavix.  Requires full supportive care.   Very weak.  In wheelchair.                             Reviewed  EMR  Reviewed medical, social, surgical and family history.  Reviewed  all current medications and performed medication reconciliation.  Performed prescription drug management.  Reviewed vital signs AND lab results  Reviewed [facility] Documentation  Discussed patient with nursing.       ROS: Limited ROS due to mentation; ROS negative unless noted in HPI.     Surgical History  Problems    · History of Femur fracture repair   · History of Tonsillectomy     Family History  Mother    · Family history of   Father    · Family history of      Social History   · Never a smoker  No alcohol  No illicit drugs  Lives in long term care facility              Past Medical History:   Diagnosis Date    Other malaise 2020    Physical deconditioning    Personal history of other diseases of the digestive system     History of constipation    Personal history of other diseases of the nervous system and sense organs 2020    History of polyneuropathy    Personal history of urinary (tract) infections     History of urinary tract infection    Unspecified dementia, unspecified severity, without behavioral disturbance, psychotic disturbance, mood disturbance, and anxiety (Multi) 12/10/2022    Dementia    Unspecified diastolic (congestive) heart failure (Multi)     Diastolic heart failure    Unspecified fracture of right femur, initial encounter for closed fracture (Multi)     Femur fracture, right             Lab Results   Component Value Date    WBC 9.1 2023    HGB 12.7 2023    HCT 41.6 2023     2023    ALT 8 2023    AST 14 2023     2023    K 4.1 2023     2023    CREATININE 0.81 2023    BUN 30 (H) 2023    CO2 21 2023    TSH 2.92 10/14/2020    INR 1.0 2022    HGBA1C 5.9 10/15/2020     Outside Labs  CBC: Date: 10/12/22, WBC: 4.8, Hgb: 11.9, Hct: 35.2, PLT: 290   CBC: Date: 22, WBC: 3.9, Hgb: 12.7, Hct: 38.5, PLT: 307   BMP: Date: 10/12/22, Na: 140, K: 4.3, Cl: 107, CO2: 25, BUN:  25, Cr: 0.6, Glu: 80, Ca: 9   BMP: Date: 7/7/22, Na: 141, K: 4.6, Cl: 105, CO2: 26, BUN: 30, Cr: 0.7, Glu: 113, Ca: 9.1   Hepatic Function Tests: Date: 3/8/22, AST: 15, ALT: 11, Alk Phos: 114, T Bili: 0.4, Albumin: 3.6   Hepatic Function Tests: Date: 3/9/21, AST: 15, ALT: 14, Alk Phos: 101, T Bili: 0.6, Albumin: 3.3   Lipid panel on 10/13/20 as follows: Cholesterol 186; triglyceride 84; HDL 51; ; VLDL 17.    Vitamin D 25 hydroxy level on 10/13/20 22 L    BMP: Date: 1/10/2023 Na 142 K 4.4 Cr 0.7 BUN 27 glucose 77 Ca 9.2 GFR 79  CBC: Date: 1/10/2023 WBC 6.9 Hgb 12.8 hematocrit 38.4 platelets 398  Liver function: Date: 1/10/2023 ALT 11 AST 18 alkaline phos.  112 bilirubin 0.5 albumin 3.7 protein 6.4    BMP: Date: 4/18/2023 Na 145 K 4 Cr 0.7 BUN 25 glucose 85 Ca 8.8 GFR 79  CBC: Date: 4/18/2023 WBC 5.2 Hgb 12.6 hematocrit 38.3 platelets 298  Liver function: Date: 4/18/2023 ALT 6 AST 11 alkaline phos.  94 bilirubin 0.5 albumin 3.4 protein 5.7   4/18/23 vitamin D 25 hydroxy level 19.62 L    BMP: Date: 8/8/2023 Na 142 K 4.9 Cr 0.6 BUN 24 glucose 65 Ca 9.2 GFR 94  CBC: Date: 8/8/2023 WBC 5.4 Hgb 12.8 hematocrit 40.4 platelets 398  Liver function: Date: 8/8/2023 ALT 8 AST 18 alkaline phos.  108 bilirubin 0.6 albumin 3.7 protein 6.4    8/8/23 vitamin D hydroxy level 29.92 L    BMP: Date: 10/6/2023 Na 142 K 4.5 Cr 0.7 BUN 28 glucose 86 Ca 9.8 GFR 79  CBC: Date: 10/6/2023 WBC 6.2 Hgb 12.7 hematocrit 39.3 platelets 358    10/6/2023: TSH 2.652 within normal limits (range 0.340-5.500)    10/6/2023: Vitamin D 25-hydroxy level 32.58 within normal limits (range )    11/2/2023: Vitamin D 25-hydroxy level 33.84    BMP: Date: 12/4/2023 Na 143 K 4.3 Cr 0.6 BUN 23 glucose 76 Ca 9.8 GFR 94  Liver function: date: 12/4/2023 ALT 10 AST 17 alkaline phos.  112 bilirubin 0.7 albumin 3.8 protein 6.5    12/4/2023 vitamin D 25-hydroxy level 56 wnl    BMP: Date: 12/11/2023 Na 140 K 4.9 Cr 0.6 BUN 21 glucose 55 Ca 9.1 GFR 94  CBC: Date:  "12/13/2023 WBC 5.9 Hgb 12.6 hematocrit 39.32 platelets 364  Liver function: Date: 12/11/2023 ALT 8 AST 15 alkaline phos.  97 bilirubin 0.5 albumin 3.3 protein 5.6    12/11/2023 vitamin D hydroxy level 43 wnl    BMP: Date: 1/29/2024 Na 142 K 4.1 Cr 0.7 BUN 23 glucose 81 Ca 9.7 GFR 79  CBC: Date: 1/29/2024 WBC 6.7 Hgb 13.1 hematocrit 40.6 platelets 368  Liver function: Date: 1/29/2024 ALT 14 AST 21 alkaline phos.  131 bilirubin 0.8 albumin 4 protein 6.7    BMP: Date: 3/11/2024 Na 140 K 4.1 Cr 0.6 BUN 23 glucose 129 Ca 9.4 GFR 94  CBC: Date: 3/11/2024 WBC 6.3 Hgb 12.7 hematocrit 39 platelets 381  Liver function: Date: 3/11/2024 ALT 9 AST 14 alkaline phos.  119 bilirubin 0.5 albumin 3.5 protein 6.1    3/11/2024 vitamin D 25-hydroxy level: 35 ( )    BMP: Date: 4/8/2024 Na 143 K 4.1 Cr 0.7 BUN 28 glucose 110 Ca 10.2 GFR 95  Liver function: Date: 4/8/2024 ALT 11 AST 22 alkaline phos.  134 bilirubin 1 albumin 4.1 protein 7.2    BMP: Date: 4/18/2024 Na 141 K 4.2 Cr 0.5 BUN 22 glucose 78 Ca 9.6   CBC: Date: 4/18/2024 WBC 7.8 Hgb 13.9 hematocrit 42.9 platelets 444  Liver function: Date: 4/18/2024 ALT 9 AST 15 alkaline phos.  111 bilirubin 0.5 albumin 3.6 protein 6.1    4/18/2024 vitamin D 25-hydroxy level 35 (range )    BMP: Date: 4/22/2024 Na 144 K 4.3 Cr 0.5 BUN 18 glucose 97 Ca 9   CBC: Date: 4/22/2024 WBC 6.5 Hgb 13.4 hematocrit 40.7 platelets 442  Liver function: Date: 4/22/2024 ALT 7 AST 13 alkaline phos.  98 bilirubin 0.6 albumin 3.3 protein 6    4/22/24 vitamin D 25 hydroxy level 34    BMP: Date: 6/17/2024 Na 143 K 4.3 Cr 0.5 BUN 20 glucose 61 Ca 9.1   CBC: Date: 6/17/2024 WBC 5.3 Hgb 12.8 hematocrit 35.1 platelets 316  Liver function: Date: 6/17/2024 ALT 6 AST 14 alkaline phos.  101 bilirubin 0.4 albumin 3.3 protein 5.6    6/17/2024 vitamin D 25-hydroxy level 33        /76   Pulse 70   Temp 36.4 °C (97.5 °F)   Resp 18   Ht 1.575 m (5' 2\")   Wt (!) 44.2 kg (97 lb 8 oz)   " SpO2 96%   BMI 17.83 kg/m²      Physical Exam  Vitals and nursing note reviewed.   Constitutional:       Appearance: Normal appearance.   HENT:      Head: Normocephalic.      Right Ear: External ear normal.      Left Ear: External ear normal.      Nose: Nose normal.      Mouth/Throat:      Mouth: Mucous membranes are moist.      Pharynx: Oropharynx is clear.   Eyes:      Extraocular Movements: Extraocular movements intact.      Conjunctiva/sclera: Conjunctivae normal.      Pupils: Pupils are equal, round, and reactive to light.   Cardiovascular:      Rate and Rhythm: Normal rate and regular rhythm.      Pulses: Normal pulses.      Heart sounds: Normal heart sounds.   Pulmonary:      Effort: Pulmonary effort is normal.      Breath sounds: Normal breath sounds.   Abdominal:      General: Bowel sounds are normal.      Palpations: Abdomen is soft.   Musculoskeletal:         General: Normal range of motion.      Cervical back: Normal range of motion and neck supple.      Comments: Generalized muscle weakness; impaired gait and mobility   Skin:     General: Skin is warm and dry.   Neurological:      Mental Status: She is alert. Mental status is at baseline. She is disoriented.      Motor: Weakness present.      Coordination: Coordination abnormal.      Gait: Gait abnormal.   Psychiatric:         Mood and Affect: Affect is inappropriate.         Speech: Speech is tangential.         Cognition and Memory: Cognition is impaired. Memory is impaired.      Comments: Dementia   A x O x 1          Assessment/Plan     Dementia; Alzheimer's disease; weight loss:   Continue donepezil.  Monitor weights.  Continue house supplements and shakes TID.                Sequela of  CVA; left sided weakness; immobility; generalized muscle weakness:  Patient had a stroke in October, 2020.  Continue Plavix.  Requires full supportive care.   Continue to use wheelchair.  Fall prevention strategies.       Problem List Items Addressed This Visit           Neuro    Dementia (Multi) - Primary     Other Visit Diagnoses       Weight loss        Sequela of cerebrovascular accident        Left-sided weakness        Immobility        Generalized muscle weakness                    Sandi Rudolph, APRN-CNP

## 2024-07-26 ENCOUNTER — NURSING HOME VISIT (OUTPATIENT)
Dept: POST ACUTE CARE | Facility: EXTERNAL LOCATION | Age: 89
End: 2024-07-26
Payer: COMMERCIAL

## 2024-07-26 DIAGNOSIS — R63.4 WEIGHT LOSS: ICD-10-CM

## 2024-07-26 DIAGNOSIS — F02.C18 SEVERE ALZHEIMER'S DEMENTIA WITH OTHER BEHAVIORAL DISTURBANCE, UNSPECIFIED TIMING OF DEMENTIA ONSET (MULTI): Primary | ICD-10-CM

## 2024-07-26 DIAGNOSIS — M62.81 GENERALIZED MUSCLE WEAKNESS: ICD-10-CM

## 2024-07-26 DIAGNOSIS — G30.9 SEVERE ALZHEIMER'S DEMENTIA WITH OTHER BEHAVIORAL DISTURBANCE, UNSPECIFIED TIMING OF DEMENTIA ONSET (MULTI): Primary | ICD-10-CM

## 2024-07-26 PROCEDURE — 99308 SBSQ NF CARE LOW MDM 20: CPT | Performed by: INTERNAL MEDICINE

## 2024-07-26 NOTE — LETTER
Patient: Shonda Solano  : 3/14/1934    Encounter Date: 2024     Ms Solano is a 89-year-old woman with history of Alzheimer's dementia,CVA().  She is a long-term resident at [facility].  No recent falls. lying in her room today. No concerns by staff.  ROS: no changes in weight  Chest- no pain or palpitations  Lungs- no cough or SOB  Abd- no pain, no N/V/D  Ext- no swelling  Psych- memory impaired  Vital signs: /62  Temperature 97.6  Pulse 64  Weight 94 pounds  Gen- weak,NAD, pleasantly confused  Neck- supple, no jvd  Lungs: Lungs clear toauscultation  Cardio: S1-S2 normal  ABD: Soft nontender bowel sounds present  Musc/Skel: No deformities or edema  Extremities: No edema  Neuro- alert, no gross deformity  Psych: Memory impaired  Assessment and Plan:  Wt loss- c/w remeron  CVA-stable  Continue with aspirin, Plavix  continue with fall and safety precautions  Alzheimer's dementia-stable  Continue with Aricept  Depression- followed by Psych  Continue with supportive care      Electronically Signed By: Indiana Wen MD   24 12:06 PM

## 2024-08-09 NOTE — PROGRESS NOTES
Ms Solano is a 89-year-old woman with history of Alzheimer's dementia,CVA(2020).  She is a long-term resident at [facility].  No recent falls. lying in her room today. No concerns by staff.  ROS: no changes in weight  Chest- no pain or palpitations  Lungs- no cough or SOB  Abd- no pain, no N/V/D  Ext- no swelling  Psych- memory impaired  Vital signs: /62  Temperature 97.6  Pulse 64  Weight 94 pounds  Gen- weak,NAD, pleasantly confused  Neck- supple, no jvd  Lungs: Lungs clear toauscultation  Cardio: S1-S2 normal  ABD: Soft nontender bowel sounds present  Musc/Skel: No deformities or edema  Extremities: No edema  Neuro- alert, no gross deformity  Psych: Memory impaired  Assessment and Plan:  Wt loss- c/w remeron  CVA-stable  Continue with aspirin, Plavix  continue with fall and safety precautions  Alzheimer's dementia-stable  Continue with Aricept  Depression- followed by Psych  Continue with supportive care

## 2024-08-29 ENCOUNTER — NURSING HOME VISIT (OUTPATIENT)
Dept: POST ACUTE CARE | Facility: EXTERNAL LOCATION | Age: 89
End: 2024-08-29
Payer: COMMERCIAL

## 2024-08-29 DIAGNOSIS — M62.81 GENERALIZED MUSCLE WEAKNESS: ICD-10-CM

## 2024-08-29 DIAGNOSIS — F02.C18 SEVERE ALZHEIMER'S DEMENTIA WITH OTHER BEHAVIORAL DISTURBANCE, UNSPECIFIED TIMING OF DEMENTIA ONSET (MULTI): ICD-10-CM

## 2024-08-29 DIAGNOSIS — R53.1 LEFT-SIDED WEAKNESS: ICD-10-CM

## 2024-08-29 DIAGNOSIS — G30.9 SEVERE ALZHEIMER'S DEMENTIA WITH OTHER BEHAVIORAL DISTURBANCE, UNSPECIFIED TIMING OF DEMENTIA ONSET (MULTI): ICD-10-CM

## 2024-08-29 DIAGNOSIS — R63.4 WEIGHT LOSS: Primary | ICD-10-CM

## 2024-08-29 DIAGNOSIS — Z74.09 IMMOBILITY: ICD-10-CM

## 2024-08-29 DIAGNOSIS — I69.30 SEQUELA OF CEREBROVASCULAR ACCIDENT: ICD-10-CM

## 2024-08-29 DIAGNOSIS — F32.9 MAJOR DEPRESSIVE DISORDER WITH CURRENT ACTIVE EPISODE, UNSPECIFIED DEPRESSION EPISODE SEVERITY, UNSPECIFIED WHETHER RECURRENT: ICD-10-CM

## 2024-08-29 PROCEDURE — 99308 SBSQ NF CARE LOW MDM 20: CPT | Performed by: NURSE PRACTITIONER

## 2024-08-29 NOTE — LETTER
Patient: Shonda Solano  : 3/14/1934    Encounter Date: 2024    Name: Shonda Solano    : 3/14/1934    Code Status:  DNR-cc    Chief Complaint:  Follow up on weight loss;  etc....    HPI   89 year old female patient at [facility] in Long Term Care.       Medical history includes: Difficulty in walking; muscle weakness (generalized); Acquired hammer toes on the right & left foot; Personal history of other infectious and parasitic diseases; disorientation; Alzheimer's disease; unspecified; symbolic dysfunctions; other speech and language deficits following unspecified cerebrovascular disease; mood disorder due to known physiological condition, unspecified; Bipolar disorder; Displaced intertrochanteric fracture of right femur; need for assistance with personal care; chronic kidney disease; primary osteoarthritis; unspecified ankle and foot; polyneuropathy, unspecified; unspecified abnormalities of gait and mobility; other osteoporosis without current pathological fracture.      Chronic comorbidities as follows:     Weight loss; Major depressive disorder; Alzheimer's disease; Dementia:              On mirtazapine and donepezil.  Patient was recently admitted to [facility] beds at the [facility] palliative program.   Also follows up with in-house psych NP at .  No agitation reported.   Weights are trending down.  Recent weights as follows:  23 110.8 #  23 107.4 #  10/6/23 106.2 #  23 105.2 #  23 103 #  24 102.4 #  24 102.4 #  3/6/24 100 #  4/3/24 103.2 #  24 97.6 #  24 96.4 #  7/3/24 97.5 #  24 94 #   Also on house supplements and shakes TID.  Patient seen today, she is sitting up in her wheelchair.   Calm, cooperative, pleasantly confused.   Mentation at baseline.   A x O x 1.   No complaints of headaches or dizziness.  No complaints of chest pain or SOB          Generalized muscle weakness; Sequela of CVA; left sided weakness; immobility; :  Patient had a stroke in  .  On  Plavix.  Requires full supportive care.   Very weak.  In wheelchair.                             Reviewed  EMR  Reviewed medical, social, surgical and family history.  Reviewed all current medications and performed medication reconciliation.  Performed prescription drug management.  Reviewed vital signs AND lab results  Reviewed [facility] Documentation  Discussed patient with nursing.       ROS: Limited ROS due to mentation; ROS negative unless noted in HPI.     Surgical History  Problems    · History of Femur fracture repair   · History of Tonsillectomy     Family History  Mother    · Family history of   Father    · Family history of      Social History   · Never a smoker  No alcohol  No illicit drugs  Lives in long term care facility              Past Medical History:   Diagnosis Date   • Other malaise 2020    Physical deconditioning   • Personal history of other diseases of the digestive system     History of constipation   • Personal history of other diseases of the nervous system and sense organs 2020    History of polyneuropathy   • Personal history of urinary (tract) infections     History of urinary tract infection   • Unspecified dementia, unspecified severity, without behavioral disturbance, psychotic disturbance, mood disturbance, and anxiety (Multi) 12/10/2022    Dementia   • Unspecified diastolic (congestive) heart failure (Multi)     Diastolic heart failure   • Unspecified fracture of right femur, initial encounter for closed fracture (Multi)     Femur fracture, right             Lab Results   Component Value Date    WBC 9.1 2023    HGB 12.7 2023    HCT 41.6 2023     2023    ALT 8 2023    AST 14 2023     2023    K 4.1 2023     2023    CREATININE 0.81 2023    BUN 30 (H) 2023    CO2 21 2023    TSH 2.92 10/14/2020    INR 1.0 2022    HGBA1C 5.9  10/15/2020     Outside Labs  CBC: Date: 10/12/22, WBC: 4.8, Hgb: 11.9, Hct: 35.2, PLT: 290   CBC: Date: 7/7/22, WBC: 3.9, Hgb: 12.7, Hct: 38.5, PLT: 307   BMP: Date: 10/12/22, Na: 140, K: 4.3, Cl: 107, CO2: 25, BUN: 25, Cr: 0.6, Glu: 80, Ca: 9   BMP: Date: 7/7/22, Na: 141, K: 4.6, Cl: 105, CO2: 26, BUN: 30, Cr: 0.7, Glu: 113, Ca: 9.1   Hepatic Function Tests: Date: 3/8/22, AST: 15, ALT: 11, Alk Phos: 114, T Bili: 0.4, Albumin: 3.6   Hepatic Function Tests: Date: 3/9/21, AST: 15, ALT: 14, Alk Phos: 101, T Bili: 0.6, Albumin: 3.3   Lipid panel on 10/13/20 as follows: Cholesterol 186; triglyceride 84; HDL 51; ; VLDL 17.    Vitamin D 25 hydroxy level on 10/13/20 22 L    BMP: Date: 1/10/2023 Na 142 K 4.4 Cr 0.7 BUN 27 glucose 77 Ca 9.2 GFR 79  CBC: Date: 1/10/2023 WBC 6.9 Hgb 12.8 hematocrit 38.4 platelets 398  Liver function: Date: 1/10/2023 ALT 11 AST 18 alkaline phos.  112 bilirubin 0.5 albumin 3.7 protein 6.4    BMP: Date: 4/18/2023 Na 145 K 4 Cr 0.7 BUN 25 glucose 85 Ca 8.8 GFR 79  CBC: Date: 4/18/2023 WBC 5.2 Hgb 12.6 hematocrit 38.3 platelets 298  Liver function: Date: 4/18/2023 ALT 6 AST 11 alkaline phos.  94 bilirubin 0.5 albumin 3.4 protein 5.7   4/18/23 vitamin D 25 hydroxy level 19.62 L    BMP: Date: 8/8/2023 Na 142 K 4.9 Cr 0.6 BUN 24 glucose 65 Ca 9.2 GFR 94  CBC: Date: 8/8/2023 WBC 5.4 Hgb 12.8 hematocrit 40.4 platelets 398  Liver function: Date: 8/8/2023 ALT 8 AST 18 alkaline phos.  108 bilirubin 0.6 albumin 3.7 protein 6.4    8/8/23 vitamin D hydroxy level 29.92 L    BMP: Date: 10/6/2023 Na 142 K 4.5 Cr 0.7 BUN 28 glucose 86 Ca 9.8 GFR 79  CBC: Date: 10/6/2023 WBC 6.2 Hgb 12.7 hematocrit 39.3 platelets 358    10/6/2023: TSH 2.652 within normal limits (range 0.340-5.500)    10/6/2023: Vitamin D 25-hydroxy level 32.58 within normal limits (range )    11/2/2023: Vitamin D 25-hydroxy level 33.84    BMP: Date: 12/4/2023 Na 143 K 4.3 Cr 0.6 BUN 23 glucose 76 Ca 9.8 GFR 94  Liver function: date:  12/4/2023 ALT 10 AST 17 alkaline phos.  112 bilirubin 0.7 albumin 3.8 protein 6.5    12/4/2023 vitamin D 25-hydroxy level 56 wnl    BMP: Date: 12/11/2023 Na 140 K 4.9 Cr 0.6 BUN 21 glucose 55 Ca 9.1 GFR 94  CBC: Date: 12/13/2023 WBC 5.9 Hgb 12.6 hematocrit 39.32 platelets 364  Liver function: Date: 12/11/2023 ALT 8 AST 15 alkaline phos.  97 bilirubin 0.5 albumin 3.3 protein 5.6    12/11/2023 vitamin D hydroxy level 43 wnl    BMP: Date: 1/29/2024 Na 142 K 4.1 Cr 0.7 BUN 23 glucose 81 Ca 9.7 GFR 79  CBC: Date: 1/29/2024 WBC 6.7 Hgb 13.1 hematocrit 40.6 platelets 368  Liver function: Date: 1/29/2024 ALT 14 AST 21 alkaline phos.  131 bilirubin 0.8 albumin 4 protein 6.7    BMP: Date: 3/11/2024 Na 140 K 4.1 Cr 0.6 BUN 23 glucose 129 Ca 9.4 GFR 94  CBC: Date: 3/11/2024 WBC 6.3 Hgb 12.7 hematocrit 39 platelets 381  Liver function: Date: 3/11/2024 ALT 9 AST 14 alkaline phos.  119 bilirubin 0.5 albumin 3.5 protein 6.1    3/11/2024 vitamin D 25-hydroxy level: 35 ( )    BMP: Date: 4/8/2024 Na 143 K 4.1 Cr 0.7 BUN 28 glucose 110 Ca 10.2 GFR 95  Liver function: Date: 4/8/2024 ALT 11 AST 22 alkaline phos.  134 bilirubin 1 albumin 4.1 protein 7.2    BMP: Date: 4/18/2024 Na 141 K 4.2 Cr 0.5 BUN 22 glucose 78 Ca 9.6   CBC: Date: 4/18/2024 WBC 7.8 Hgb 13.9 hematocrit 42.9 platelets 444  Liver function: Date: 4/18/2024 ALT 9 AST 15 alkaline phos.  111 bilirubin 0.5 albumin 3.6 protein 6.1    4/18/2024 vitamin D 25-hydroxy level 35 (range )    BMP: Date: 4/22/2024 Na 144 K 4.3 Cr 0.5 BUN 18 glucose 97 Ca 9   CBC: Date: 4/22/2024 WBC 6.5 Hgb 13.4 hematocrit 40.7 platelets 442  Liver function: Date: 4/22/2024 ALT 7 AST 13 alkaline phos.  98 bilirubin 0.6 albumin 3.3 protein 6    4/22/24 vitamin D 25 hydroxy level 34    BMP: Date: 6/17/2024 Na 143 K 4.3 Cr 0.5 BUN 20 glucose 61 Ca 9.1   CBC: Date: 6/17/2024 WBC 5.3 Hgb 12.8 hematocrit 35.1 platelets 316  Liver function: Date: 6/17/2024 ALT 6 AST 14 alkaline  "phos.  101 bilirubin 0.4 albumin 3.3 protein 5.6    6/17/2024 vitamin D 25-hydroxy level 33        /71   Pulse 64   Temp 36.3 °C (97.4 °F)   Resp 18   Ht 1.575 m (5' 2\")   Wt (!) 42.6 kg (94 lb)   SpO2 97%   BMI 17.19 kg/m²      Physical Exam  Vitals and nursing note reviewed.   Constitutional:       Appearance: Normal appearance.   HENT:      Head: Normocephalic.      Right Ear: External ear normal.      Left Ear: External ear normal.      Nose: Nose normal.      Mouth/Throat:      Mouth: Mucous membranes are moist.      Pharynx: Oropharynx is clear.   Eyes:      Extraocular Movements: Extraocular movements intact.      Conjunctiva/sclera: Conjunctivae normal.      Pupils: Pupils are equal, round, and reactive to light.   Cardiovascular:      Rate and Rhythm: Normal rate and regular rhythm.      Pulses: Normal pulses.      Heart sounds: Normal heart sounds.   Pulmonary:      Effort: Pulmonary effort is normal.      Breath sounds: Normal breath sounds.   Abdominal:      General: Bowel sounds are normal.      Palpations: Abdomen is soft.   Musculoskeletal:         General: Normal range of motion.      Cervical back: Normal range of motion and neck supple.      Comments: Generalized muscle weakness; impaired gait and mobility   Skin:     General: Skin is warm and dry.   Neurological:      Mental Status: She is alert. Mental status is at baseline. She is disoriented.      Motor: Weakness present.      Coordination: Coordination abnormal.      Gait: Gait abnormal.   Psychiatric:         Mood and Affect: Affect is inappropriate.         Speech: Speech is tangential.         Cognition and Memory: Cognition is impaired. Memory is impaired.      Comments: Dementia   A x O x 1          Assessment/Plan    Weight loss; major depressive disorder; Dementia; Alzheimer's disease:  Continue mirtazapine and donepezil.  Monitor weights.  Continue house supplements and shakes TID.  Continue with [facility] " program.        Generalized muscle weakness; Sequela of  CVA; left sided weakness; immobility :  Patient had a stroke in October, 2020.  Continue Plavix.  Requires full supportive care.   Continue to use wheelchair.  Fall prevention strategies.       Problem List Items Addressed This Visit          Neuro    Dementia (Multi)     Other Visit Diagnoses       Weight loss    -  Primary    Major depressive disorder with current active episode, unspecified depression episode severity, unspecified whether recurrent        Generalized muscle weakness        Sequela of cerebrovascular accident        Left-sided weakness        Immobility                      YIMI Jordan       Electronically Signed By: YIMI Jordan   9/2/24  8:38 PM

## 2024-09-02 VITALS
OXYGEN SATURATION: 97 % | RESPIRATION RATE: 18 BRPM | DIASTOLIC BLOOD PRESSURE: 71 MMHG | HEIGHT: 62 IN | HEART RATE: 64 BPM | SYSTOLIC BLOOD PRESSURE: 130 MMHG | TEMPERATURE: 97.4 F | WEIGHT: 94 LBS | BODY MASS INDEX: 17.3 KG/M2

## 2024-09-03 NOTE — PROGRESS NOTES
Name: Shonda Solano    : 3/14/1934    Code Status:  DNR-cc    Chief Complaint:  Follow up on weight loss;  etc....    HPI   89 year old female patient at [facility] in Long Term Care.       Medical history includes: Difficulty in walking; muscle weakness (generalized); Acquired hammer toes on the right & left foot; Personal history of other infectious and parasitic diseases; disorientation; Alzheimer's disease; unspecified; symbolic dysfunctions; other speech and language deficits following unspecified cerebrovascular disease; mood disorder due to known physiological condition, unspecified; Bipolar disorder; Displaced intertrochanteric fracture of right femur; need for assistance with personal care; chronic kidney disease; primary osteoarthritis; unspecified ankle and foot; polyneuropathy, unspecified; unspecified abnormalities of gait and mobility; other osteoporosis without current pathological fracture.      Chronic comorbidities as follows:     Weight loss; Major depressive disorder; Alzheimer's disease; Dementia:              On mirtazapine and donepezil.  Patient was recently admitted to [facility] palliative program.   Also follows up with in-house psych NP at .  No agitation reported.   Weights are trending down.  Recent weights as follows:  23 110.8 #  23 107.4 #  10/6/23 106.2 #  23 105.2 #  23 103 #  24 102.4 #  24 102.4 #  3/6/24 100 #  4/3/24 103.2 #  24 97.6 #  24 96.4 #  7/3/24 97.5 #  24 94 #   Also on house supplements and shakes TID.  Patient seen today, she is sitting up in her wheelchair.   Calm, cooperative, pleasantly confused.   Mentation at baseline.   A x O x 1.   No complaints of headaches or dizziness.  No complaints of chest pain or SOB          Generalized muscle weakness; Sequela of CVA; left sided weakness; immobility; :  Patient had a stroke in .  On  Plavix.  Requires full supportive care.   Very  weak.  In wheelchair.                             Reviewed  EMR  Reviewed medical, social, surgical and family history.  Reviewed all current medications and performed medication reconciliation.  Performed prescription drug management.  Reviewed vital signs AND lab results  Reviewed [facility] Documentation  Discussed patient with nursing.       ROS: Limited ROS due to mentation; ROS negative unless noted in HPI.     Surgical History  Problems    · History of Femur fracture repair   · History of Tonsillectomy     Family History  Mother    · Family history of   Father    · Family history of      Social History   · Never a smoker  No alcohol  No illicit drugs  Lives in long term care facility              Past Medical History:   Diagnosis Date    Other malaise 2020    Physical deconditioning    Personal history of other diseases of the digestive system     History of constipation    Personal history of other diseases of the nervous system and sense organs 2020    History of polyneuropathy    Personal history of urinary (tract) infections     History of urinary tract infection    Unspecified dementia, unspecified severity, without behavioral disturbance, psychotic disturbance, mood disturbance, and anxiety (Multi) 12/10/2022    Dementia    Unspecified diastolic (congestive) heart failure (Multi)     Diastolic heart failure    Unspecified fracture of right femur, initial encounter for closed fracture (Multi)     Femur fracture, right             Lab Results   Component Value Date    WBC 9.1 2023    HGB 12.7 2023    HCT 41.6 2023     2023    ALT 8 2023    AST 14 2023     2023    K 4.1 2023     2023    CREATININE 0.81 2023    BUN 30 (H) 2023    CO2 21 2023    TSH 2.92 10/14/2020    INR 1.0 2022    HGBA1C 5.9 10/15/2020     Outside Labs  CBC: Date: 10/12/22, WBC: 4.8, Hgb: 11.9, Hct: 35.2,  PLT: 290   CBC: Date: 7/7/22, WBC: 3.9, Hgb: 12.7, Hct: 38.5, PLT: 307   BMP: Date: 10/12/22, Na: 140, K: 4.3, Cl: 107, CO2: 25, BUN: 25, Cr: 0.6, Glu: 80, Ca: 9   BMP: Date: 7/7/22, Na: 141, K: 4.6, Cl: 105, CO2: 26, BUN: 30, Cr: 0.7, Glu: 113, Ca: 9.1   Hepatic Function Tests: Date: 3/8/22, AST: 15, ALT: 11, Alk Phos: 114, T Bili: 0.4, Albumin: 3.6   Hepatic Function Tests: Date: 3/9/21, AST: 15, ALT: 14, Alk Phos: 101, T Bili: 0.6, Albumin: 3.3   Lipid panel on 10/13/20 as follows: Cholesterol 186; triglyceride 84; HDL 51; ; VLDL 17.    Vitamin D 25 hydroxy level on 10/13/20 22 L    BMP: Date: 1/10/2023 Na 142 K 4.4 Cr 0.7 BUN 27 glucose 77 Ca 9.2 GFR 79  CBC: Date: 1/10/2023 WBC 6.9 Hgb 12.8 hematocrit 38.4 platelets 398  Liver function: Date: 1/10/2023 ALT 11 AST 18 alkaline phos.  112 bilirubin 0.5 albumin 3.7 protein 6.4    BMP: Date: 4/18/2023 Na 145 K 4 Cr 0.7 BUN 25 glucose 85 Ca 8.8 GFR 79  CBC: Date: 4/18/2023 WBC 5.2 Hgb 12.6 hematocrit 38.3 platelets 298  Liver function: Date: 4/18/2023 ALT 6 AST 11 alkaline phos.  94 bilirubin 0.5 albumin 3.4 protein 5.7   4/18/23 vitamin D 25 hydroxy level 19.62 L    BMP: Date: 8/8/2023 Na 142 K 4.9 Cr 0.6 BUN 24 glucose 65 Ca 9.2 GFR 94  CBC: Date: 8/8/2023 WBC 5.4 Hgb 12.8 hematocrit 40.4 platelets 398  Liver function: Date: 8/8/2023 ALT 8 AST 18 alkaline phos.  108 bilirubin 0.6 albumin 3.7 protein 6.4    8/8/23 vitamin D hydroxy level 29.92 L    BMP: Date: 10/6/2023 Na 142 K 4.5 Cr 0.7 BUN 28 glucose 86 Ca 9.8 GFR 79  CBC: Date: 10/6/2023 WBC 6.2 Hgb 12.7 hematocrit 39.3 platelets 358    10/6/2023: TSH 2.652 within normal limits (range 0.340-5.500)    10/6/2023: Vitamin D 25-hydroxy level 32.58 within normal limits (range )    11/2/2023: Vitamin D 25-hydroxy level 33.84    BMP: Date: 12/4/2023 Na 143 K 4.3 Cr 0.6 BUN 23 glucose 76 Ca 9.8 GFR 94  Liver function: date: 12/4/2023 ALT 10 AST 17 alkaline phos.  112 bilirubin 0.7 albumin 3.8 protein  6.5    12/4/2023 vitamin D 25-hydroxy level 56 wnl    BMP: Date: 12/11/2023 Na 140 K 4.9 Cr 0.6 BUN 21 glucose 55 Ca 9.1 GFR 94  CBC: Date: 12/13/2023 WBC 5.9 Hgb 12.6 hematocrit 39.32 platelets 364  Liver function: Date: 12/11/2023 ALT 8 AST 15 alkaline phos.  97 bilirubin 0.5 albumin 3.3 protein 5.6    12/11/2023 vitamin D hydroxy level 43 wnl    BMP: Date: 1/29/2024 Na 142 K 4.1 Cr 0.7 BUN 23 glucose 81 Ca 9.7 GFR 79  CBC: Date: 1/29/2024 WBC 6.7 Hgb 13.1 hematocrit 40.6 platelets 368  Liver function: Date: 1/29/2024 ALT 14 AST 21 alkaline phos.  131 bilirubin 0.8 albumin 4 protein 6.7    BMP: Date: 3/11/2024 Na 140 K 4.1 Cr 0.6 BUN 23 glucose 129 Ca 9.4 GFR 94  CBC: Date: 3/11/2024 WBC 6.3 Hgb 12.7 hematocrit 39 platelets 381  Liver function: Date: 3/11/2024 ALT 9 AST 14 alkaline phos.  119 bilirubin 0.5 albumin 3.5 protein 6.1    3/11/2024 vitamin D 25-hydroxy level: 35 ( )    BMP: Date: 4/8/2024 Na 143 K 4.1 Cr 0.7 BUN 28 glucose 110 Ca 10.2 GFR 95  Liver function: Date: 4/8/2024 ALT 11 AST 22 alkaline phos.  134 bilirubin 1 albumin 4.1 protein 7.2    BMP: Date: 4/18/2024 Na 141 K 4.2 Cr 0.5 BUN 22 glucose 78 Ca 9.6   CBC: Date: 4/18/2024 WBC 7.8 Hgb 13.9 hematocrit 42.9 platelets 444  Liver function: Date: 4/18/2024 ALT 9 AST 15 alkaline phos.  111 bilirubin 0.5 albumin 3.6 protein 6.1    4/18/2024 vitamin D 25-hydroxy level 35 (range )    BMP: Date: 4/22/2024 Na 144 K 4.3 Cr 0.5 BUN 18 glucose 97 Ca 9   CBC: Date: 4/22/2024 WBC 6.5 Hgb 13.4 hematocrit 40.7 platelets 442  Liver function: Date: 4/22/2024 ALT 7 AST 13 alkaline phos.  98 bilirubin 0.6 albumin 3.3 protein 6    4/22/24 vitamin D 25 hydroxy level 34    BMP: Date: 6/17/2024 Na 143 K 4.3 Cr 0.5 BUN 20 glucose 61 Ca 9.1   CBC: Date: 6/17/2024 WBC 5.3 Hgb 12.8 hematocrit 35.1 platelets 316  Liver function: Date: 6/17/2024 ALT 6 AST 14 alkaline phos.  101 bilirubin 0.4 albumin 3.3 protein 5.6    6/17/2024 vitamin D  "25-hydroxy level 33        /71   Pulse 64   Temp 36.3 °C (97.4 °F)   Resp 18   Ht 1.575 m (5' 2\")   Wt (!) 42.6 kg (94 lb)   SpO2 97%   BMI 17.19 kg/m²      Physical Exam  Vitals and nursing note reviewed.   Constitutional:       Appearance: Normal appearance.   HENT:      Head: Normocephalic.      Right Ear: External ear normal.      Left Ear: External ear normal.      Nose: Nose normal.      Mouth/Throat:      Mouth: Mucous membranes are moist.      Pharynx: Oropharynx is clear.   Eyes:      Extraocular Movements: Extraocular movements intact.      Conjunctiva/sclera: Conjunctivae normal.      Pupils: Pupils are equal, round, and reactive to light.   Cardiovascular:      Rate and Rhythm: Normal rate and regular rhythm.      Pulses: Normal pulses.      Heart sounds: Normal heart sounds.   Pulmonary:      Effort: Pulmonary effort is normal.      Breath sounds: Normal breath sounds.   Abdominal:      General: Bowel sounds are normal.      Palpations: Abdomen is soft.   Musculoskeletal:         General: Normal range of motion.      Cervical back: Normal range of motion and neck supple.      Comments: Generalized muscle weakness; impaired gait and mobility   Skin:     General: Skin is warm and dry.   Neurological:      Mental Status: She is alert. Mental status is at baseline. She is disoriented.      Motor: Weakness present.      Coordination: Coordination abnormal.      Gait: Gait abnormal.   Psychiatric:         Mood and Affect: Affect is inappropriate.         Speech: Speech is tangential.         Cognition and Memory: Cognition is impaired. Memory is impaired.      Comments: Dementia   A x O x 1          Assessment/Plan     Weight loss; major depressive disorder; Dementia; Alzheimer's disease:  Continue mirtazapine and donepezil.  Monitor weights.  Continue house supplements and shakes TID.  Continue with [facility] Palliative program.        Generalized muscle weakness; Sequela of  CVA; left sided " weakness; immobility :  Patient had a stroke in October, 2020.  Continue Plavix.  Requires full supportive care.   Continue to use wheelchair.  Fall prevention strategies.       Problem List Items Addressed This Visit          Neuro    Dementia (Multi)     Other Visit Diagnoses       Weight loss    -  Primary    Major depressive disorder with current active episode, unspecified depression episode severity, unspecified whether recurrent        Generalized muscle weakness        Sequela of cerebrovascular accident        Left-sided weakness        Immobility                      Sandi Rudolph, APRN-CNP

## 2024-09-20 ENCOUNTER — NURSING HOME VISIT (OUTPATIENT)
Dept: POST ACUTE CARE | Facility: EXTERNAL LOCATION | Age: 89
End: 2024-09-20

## 2024-09-20 ENCOUNTER — NURSING HOME VISIT (OUTPATIENT)
Dept: POST ACUTE CARE | Facility: EXTERNAL LOCATION | Age: 89
End: 2024-09-20
Payer: COMMERCIAL

## 2024-09-20 DIAGNOSIS — F02.C18 SEVERE ALZHEIMER'S DEMENTIA WITH OTHER BEHAVIORAL DISTURBANCE, UNSPECIFIED TIMING OF DEMENTIA ONSET (MULTI): ICD-10-CM

## 2024-09-20 DIAGNOSIS — M62.81 GENERALIZED MUSCLE WEAKNESS: ICD-10-CM

## 2024-09-20 DIAGNOSIS — G30.9 SEVERE ALZHEIMER'S DEMENTIA WITH OTHER BEHAVIORAL DISTURBANCE, UNSPECIFIED TIMING OF DEMENTIA ONSET (MULTI): ICD-10-CM

## 2024-09-20 DIAGNOSIS — I10 BENIGN ESSENTIAL HTN: ICD-10-CM

## 2024-09-20 DIAGNOSIS — Z74.09 IMMOBILITY: ICD-10-CM

## 2024-09-20 DIAGNOSIS — F32.9 MAJOR DEPRESSIVE DISORDER WITH CURRENT ACTIVE EPISODE, UNSPECIFIED DEPRESSION EPISODE SEVERITY, UNSPECIFIED WHETHER RECURRENT: Primary | ICD-10-CM

## 2024-09-20 PROCEDURE — 99308 SBSQ NF CARE LOW MDM 20: CPT | Performed by: INTERNAL MEDICINE

## 2024-09-20 NOTE — LETTER
Patient: Shonda Solano  : 3/14/1934    Encounter Date: 2024    Ms Solano is a 89-year-old woman with history of Alzheimer's dementia,CVA().  she had covid recently, given anti viral rx. Doing better.  She is a long-term resident at [facility].  No recent falls. lying in her room today. No concerns by staff.  ROS:no changes in weight  Chest- no pain or palpitations  Lungs- no cough or SOB  Abd- no pain, no N/V/D  Ext- no swelling  Psych- memory impaired  Vital signs: /71  Temperature 97.6  Pulse 64  Weight 94 pounds  Gen- weak,NAD, pleasantly confused  Neck- supple, no jvd  Lungs: Lungs clear toauscultation  Cardio: S1-S2 normal  ABD: Soft nontender bowel sounds present  Musc/Skel: No deformities or edema  Extremities: No edema  Neuro- alert, no gross deformity  Psych: Memory impaired  Assessment and Plan:  CVA-stable  c/w remeron for appetite  Continue with aspirin, Plavix  continue with fall and safety precautions  Alzheimer's dementia-stable  Continue with Aricept  Depression- followed by Psych  Continue with supportive care      Electronically Signed By: Indiana Wen MD   24  8:12 PM

## 2024-09-20 NOTE — LETTER
Patient: Shonda Solano  : 3/14/1934    Encounter Date: 2024    Ms Solano is a 89-year-old woman with history of Alzheimer's dementia,CVA().  she had covid recently, given anti viral rx. Doing better.  She is a long-term resident at [facility].  No recent falls. lying in her room today. No concerns by staff.  ROS:no changes in weight  Chest- no pain or palpitations  Lungs- no cough or SOB  Abd- no pain, no N/V/D  Ext- no swelling  Psych- memory impaired  Vital signs: /71  Temperature 97.6  Pulse 64  Weight 94 pounds  Gen- weak,NAD, pleasantly confused  Neck- supple, no jvd  Lungs: Lungs clear toauscultation  Cardio: S1-S2 normal  ABD: Soft nontender bowel sounds present  Musc/Skel: No deformities or edema  Extremities: No edema  Neuro- alert, no gross deformity  Psych: Memory impaired  Assessment and Plan:  CVA-stable  c/w remeron for appetite  Continue with aspirin, Plavix  continue with fall and safety precautions  Alzheimer's dementia-stable  Continue with Aricept  Depression- followed by Psych  Continue with supportive care      Electronically Signed By: Indiana Wen MD   24  2:43 PM

## 2024-09-21 NOTE — PROGRESS NOTES
Ms Solano is a 89-year-old woman with history of Alzheimer's dementia,CVA(2020).  she had covid recently, given anti viral rx. Doing better.  She is a long-term resident at [facility].  No recent falls. lying in her room today. No concerns by staff.  ROS:no changes in weight  Chest- no pain or palpitations  Lungs- no cough or SOB  Abd- no pain, no N/V/D  Ext- no swelling  Psych- memory impaired  Vital signs: /71  Temperature 97.6  Pulse 64  Weight 94 pounds  Gen- weak,NAD, pleasantly confused  Neck- supple, no jvd  Lungs: Lungs clear toauscultation  Cardio: S1-S2 normal  ABD: Soft nontender bowel sounds present  Musc/Skel: No deformities or edema  Extremities: No edema  Neuro- alert, no gross deformity  Psych: Memory impaired  Assessment and Plan:  CVA-stable  c/w remeron for appetite  Continue with aspirin, Plavix  continue with fall and safety precautions  Alzheimer's dementia-stable  Continue with Aricept  Depression- followed by Psych  Continue with supportive care

## 2024-09-30 NOTE — PROGRESS NOTES
Ms Solano is a 89-year-old woman with history of Alzheimer's dementia,CVA(2020).  she had covid recently, given anti viral rx. Doing better.  She is a long-term resident at [facility].  No recent falls. lying in her room today. No concerns by staff.  ROS:no changes in weight  Chest- no pain or palpitations  Lungs- no cough or SOB  Abd- no pain, no N/V/D  Ext- no swelling  Psych- memory impaired  Vital signs: /71  Temperature 97.6  Pulse 64  Weight 94 pounds  Gen- weak,NAD, pleasantly confused  Neck- supple, no jvd  Lungs: Lungs clear toauscultation  Cardio: S1-S2 normal  ABD: Soft nontender bowel sounds present  Musc/Skel: No deformities or edema  Extremities: No edema  Neuro- alert, no gross deformity  Psych: Memory impaired  Assessment and Plan:  CVA-stable  c/w remeron for appetite  Continue with aspirin, Plavix  continue with fall and safety precautions  Alzheimer's dementia-stable  Continue with Aricept  Depression- followed by Psych  Continue with supportive care

## 2024-10-01 ENCOUNTER — NURSING HOME VISIT (OUTPATIENT)
Dept: POST ACUTE CARE | Facility: EXTERNAL LOCATION | Age: 89
End: 2024-10-01
Payer: COMMERCIAL

## 2024-10-01 DIAGNOSIS — F02.C18 SEVERE ALZHEIMER'S DEMENTIA WITH OTHER BEHAVIORAL DISTURBANCE, UNSPECIFIED TIMING OF DEMENTIA ONSET (MULTI): ICD-10-CM

## 2024-10-01 DIAGNOSIS — K59.00 CONSTIPATION, UNSPECIFIED CONSTIPATION TYPE: ICD-10-CM

## 2024-10-01 DIAGNOSIS — U07.1 COVID-19: Primary | ICD-10-CM

## 2024-10-01 DIAGNOSIS — E87.0 HYPERNATREMIA: ICD-10-CM

## 2024-10-01 DIAGNOSIS — F33.9 RECURRENT MAJOR DEPRESSIVE DISORDER, REMISSION STATUS UNSPECIFIED (CMS-HCC): ICD-10-CM

## 2024-10-01 DIAGNOSIS — M62.81 GENERALIZED MUSCLE WEAKNESS: ICD-10-CM

## 2024-10-01 DIAGNOSIS — I69.30 SEQUELA OF CEREBROVASCULAR ACCIDENT: ICD-10-CM

## 2024-10-01 DIAGNOSIS — G30.9 SEVERE ALZHEIMER'S DEMENTIA WITH OTHER BEHAVIORAL DISTURBANCE, UNSPECIFIED TIMING OF DEMENTIA ONSET (MULTI): ICD-10-CM

## 2024-10-01 DIAGNOSIS — R63.4 WEIGHT LOSS: ICD-10-CM

## 2024-10-01 DIAGNOSIS — Z74.09 IMMOBILITY: ICD-10-CM

## 2024-10-01 DIAGNOSIS — E87.6 HYPOKALEMIA: ICD-10-CM

## 2024-10-01 DIAGNOSIS — R53.1 LEFT-SIDED WEAKNESS: ICD-10-CM

## 2024-10-01 PROCEDURE — 99309 SBSQ NF CARE MODERATE MDM 30: CPT | Performed by: NURSE PRACTITIONER

## 2024-10-01 NOTE — LETTER
Patient: Shonda Solano  : 3/14/1934    Encounter Date: 10/01/2024    Name: Shonda Solano    : 3/14/1934    Code Status:  DNR-cc    Chief Complaint:  Follow up on Covid-19;  etc....    HPI   89 year old female patient at [facility] in Long Term [facility].       Medical history includes: Difficulty in walking; muscle weakness (generalized); Acquired hammer toes on the right & left foot; Personal history of other infectious and parasitic diseases; disorientation; Alzheimer's disease; unspecified; symbolic dysfunctions; other speech and language deficits following unspecified cerebrovascular disease; mood disorder due to known physiological condition, unspecified; Bipolar disorder; Displaced intertrochanteric fracture of right femur; need for assistance with personal care; chronic kidney disease; primary osteoarthritis; unspecified ankle and foot; polyneuropathy, unspecified; unspecified abnormalities of gait and mobility; other osteoporosis without current pathological fracture.      Diagnoses as follows:    Covid-19:  Patient recently tested positive for Covid-19 on 24.  She was coughing.  Antiviral Molnupiravir 800 mg PO Q 12 hours for 4 days and mucinex Q 6 PRN ordered for patient.   Patient's symptoms improved.  No complaints or reports of cough.  Patient is now finished with her Covid isolation.  Patient seen today.  She is sitting up in a wheelchair.  Calm, cooperative.  Mentation at baseline.   No fevers reported.  No chest pain.  No headaches or dizziness.     Hypokalemia:  Recent potassium levels as follows:  24 potassium 3.3 L  24 potassium 3.3 L    Hypernatremia:  On 24 sodium 150 H         Weight loss; Major depressive disorder; Alzheimer's disease; Dementia:              On mirtazapine and donepezil.  Patient was recently admitted to [facility] palliative program.   Also follows up with in-house psych NP at [facility].  No agitation reported.   Weights are trending  down.  Recent weights as follows:  23 110.8 #  23 107.4 #  10/6/23 106.2 #  23 105.2 #  23 103 #  24 102.4 #  24 102.4 #  3/6/24 100 #  4/3/24 103.2 #  24 97.6 #  24 96.4 #  7/3/24 97.5 #  24 94 #  24 94.6 #  10/1/24 88.4 #   Also on house supplements and shakes TID.        Immobility; Generalized muscle weakness; Sequela of CVA; left sided weakness:  Patient had a stroke in .  On  Plavix.  Requires full supportive care.   Very weak.  In wheelchair.    Constipation:  On Miralax PO every day.  No complaints of constipation.                            Reviewed  EMR  Reviewed medical, social, surgical and family history.  Reviewed all current medications and performed medication reconciliation.  Performed prescription drug management.  Reviewed vital signs AND lab results  Reviewed [facility] Documentation  Discussed patient with nursing.       ROS: Limited ROS due to mentation; ROS negative unless noted in HPI.     Surgical History  Problems    · History of Femur fracture repair   · History of Tonsillectomy     Family History  Mother    · Family history of   Father    · Family history of      Social History   · Never a smoker  No alcohol  No illicit drugs  Lives in long term care facility              Past Medical History:   Diagnosis Date   • Other malaise 2020    Physical deconditioning   • Personal history of other diseases of the digestive system     History of constipation   • Personal history of other diseases of the nervous system and sense organs 2020    History of polyneuropathy   • Personal history of urinary (tract) infections     History of urinary tract infection   • Unspecified dementia, unspecified severity, without behavioral disturbance, psychotic disturbance, mood disturbance, and anxiety (Multi) 12/10/2022    Dementia   • Unspecified diastolic (congestive) heart failure (Multi)     Diastolic heart failure    • Unspecified fracture of right femur, initial encounter for closed fracture (Multi)     Femur fracture, right             Lab Results   Component Value Date    WBC 6.8 10/02/2024    HGB 12.7 10/02/2024    HCT 40.1 10/02/2024     (H) 10/02/2024    ALT 8 08/12/2023    AST 14 08/12/2023     10/02/2024    K 4.4 10/02/2024     10/02/2024    CREATININE 0.53 10/02/2024    BUN 19 10/02/2024    CO2 28 10/02/2024    TSH 2.92 10/14/2020    INR 1.0 02/20/2022    HGBA1C 5.9 10/15/2020     Outside Labs  CBC: Date: 10/12/22, WBC: 4.8, Hgb: 11.9, Hct: 35.2, PLT: 290   CBC: Date: 7/7/22, WBC: 3.9, Hgb: 12.7, Hct: 38.5, PLT: 307   BMP: Date: 10/12/22, Na: 140, K: 4.3, Cl: 107, CO2: 25, BUN: 25, Cr: 0.6, Glu: 80, Ca: 9   BMP: Date: 7/7/22, Na: 141, K: 4.6, Cl: 105, CO2: 26, BUN: 30, Cr: 0.7, Glu: 113, Ca: 9.1   Hepatic Function Tests: Date: 3/8/22, AST: 15, ALT: 11, Alk Phos: 114, T Bili: 0.4, Albumin: 3.6   Hepatic Function Tests: Date: 3/9/21, AST: 15, ALT: 14, Alk Phos: 101, T Bili: 0.6, Albumin: 3.3   Lipid panel on 10/13/20 as follows: Cholesterol 186; triglyceride 84; HDL 51; ; VLDL 17.    Vitamin D 25 hydroxy level on 10/13/20 22 L    BMP: Date: 1/10/2023 Na 142 K 4.4 Cr 0.7 BUN 27 glucose 77 Ca 9.2 GFR 79  CBC: Date: 1/10/2023 WBC 6.9 Hgb 12.8 hematocrit 38.4 platelets 398  Liver function: Date: 1/10/2023 ALT 11 AST 18 alkaline phos.  112 bilirubin 0.5 albumin 3.7 protein 6.4    BMP: Date: 4/18/2023 Na 145 K 4 Cr 0.7 BUN 25 glucose 85 Ca 8.8 GFR 79  CBC: Date: 4/18/2023 WBC 5.2 Hgb 12.6 hematocrit 38.3 platelets 298  Liver function: Date: 4/18/2023 ALT 6 AST 11 alkaline phos.  94 bilirubin 0.5 albumin 3.4 protein 5.7   4/18/23 vitamin D 25 hydroxy level 19.62 L    BMP: Date: 8/8/2023 Na 142 K 4.9 Cr 0.6 BUN 24 glucose 65 Ca 9.2 GFR 94  CBC: Date: 8/8/2023 WBC 5.4 Hgb 12.8 hematocrit 40.4 platelets 398  Liver function: Date: 8/8/2023 ALT 8 AST 18 alkaline phos.  108 bilirubin 0.6 albumin 3.7 protein  6.4    8/8/23 vitamin D hydroxy level 29.92 L    BMP: Date: 10/6/2023 Na 142 K 4.5 Cr 0.7 BUN 28 glucose 86 Ca 9.8 GFR 79  CBC: Date: 10/6/2023 WBC 6.2 Hgb 12.7 hematocrit 39.3 platelets 358    10/6/2023: TSH 2.652 within normal limits (range 0.340-5.500)    10/6/2023: Vitamin D 25-hydroxy level 32.58 within normal limits (range )    11/2/2023: Vitamin D 25-hydroxy level 33.84    BMP: Date: 12/4/2023 Na 143 K 4.3 Cr 0.6 BUN 23 glucose 76 Ca 9.8 GFR 94  Liver function: date: 12/4/2023 ALT 10 AST 17 alkaline phos.  112 bilirubin 0.7 albumin 3.8 protein 6.5    12/4/2023 vitamin D 25-hydroxy level 56 wnl    BMP: Date: 12/11/2023 Na 140 K 4.9 Cr 0.6 BUN 21 glucose 55 Ca 9.1 GFR 94  CBC: Date: 12/13/2023 WBC 5.9 Hgb 12.6 hematocrit 39.32 platelets 364  Liver function: Date: 12/11/2023 ALT 8 AST 15 alkaline phos.  97 bilirubin 0.5 albumin 3.3 protein 5.6    12/11/2023 vitamin D hydroxy level 43 wnl    BMP: Date: 1/29/2024 Na 142 K 4.1 Cr 0.7 BUN 23 glucose 81 Ca 9.7 GFR 79  CBC: Date: 1/29/2024 WBC 6.7 Hgb 13.1 hematocrit 40.6 platelets 368  Liver function: Date: 1/29/2024 ALT 14 AST 21 alkaline phos.  131 bilirubin 0.8 albumin 4 protein 6.7    BMP: Date: 3/11/2024 Na 140 K 4.1 Cr 0.6 BUN 23 glucose 129 Ca 9.4 GFR 94  CBC: Date: 3/11/2024 WBC 6.3 Hgb 12.7 hematocrit 39 platelets 381  Liver function: Date: 3/11/2024 ALT 9 AST 14 alkaline phos.  119 bilirubin 0.5 albumin 3.5 protein 6.1    3/11/2024 vitamin D 25-hydroxy level: 35 ( )    BMP: Date: 4/8/2024 Na 143 K 4.1 Cr 0.7 BUN 28 glucose 110 Ca 10.2 GFR 95  Liver function: Date: 4/8/2024 ALT 11 AST 22 alkaline phos.  134 bilirubin 1 albumin 4.1 protein 7.2    BMP: Date: 4/18/2024 Na 141 K 4.2 Cr 0.5 BUN 22 glucose 78 Ca 9.6   CBC: Date: 4/18/2024 WBC 7.8 Hgb 13.9 hematocrit 42.9 platelets 444  Liver function: Date: 4/18/2024 ALT 9 AST 15 alkaline phos.  111 bilirubin 0.5 albumin 3.6 protein 6.1    4/18/2024 vitamin D 25-hydroxy level 35 (range  )    BMP: Date: 4/22/2024 Na 144 K 4.3 Cr 0.5 BUN 18 glucose 97 Ca 9   CBC: Date: 4/22/2024 WBC 6.5 Hgb 13.4 hematocrit 40.7 platelets 442  Liver function: Date: 4/22/2024 ALT 7 AST 13 alkaline phos.  98 bilirubin 0.6 albumin 3.3 protein 6    4/22/24 vitamin D 25 hydroxy level 34    BMP: Date: 6/17/2024 Na 143 K 4.3 Cr 0.5 BUN 20 glucose 61 Ca 9.1   CBC: Date: 6/17/2024 WBC 5.3 Hgb 12.8 hematocrit 35.1 platelets 316  Liver function: Date: 6/17/2024 ALT 6 AST 14 alkaline phos.  101 bilirubin 0.4 albumin 3.3 protein 5.6    6/17/2024 vitamin D 25-hydroxy level 33    BMP: Date: 8/1/2024 Na 142 K 4.4 Cr 0.5 BUN 29 glucose 77 Ca 9.2     BMP: Date: 8/12/2024 Na 145 K 4.3 Cr 0.6 BUN 26 glucose 50 Ca 9.7 GFR 94  CBC: Date: 8/12/2024 WBC 6.2 Hgb 13.8 hematocrit 43.5 platelets 435  Liver function: Date: 8/12/2024 ALT 7 AST 18 alkaline phos.  133 bilirubin 0.9 albumin 3.8 protein 7    BMP: Date: 8/19/2024 Na 144 K 4.3 Cr 0.6 BUN 28 glucose 94 Ca 9.4 GFR 94  CBC: Date: 8/19/2024 WBC 6.7 Hgb 12.2 hematocrit 37.9 platelets 363  Liver function: Date: 8/19/2024 ALT 9 AST 14 alkaline phos.  109 bilirubin 0.6 albumin 3.7 protein 6.3    BMP: Date: 8/26/2024 Na 146 K 3.8 Cr 0.5 BUN 24 glucose 58 Ca 9.1   Liver function: Date: 8/26/2024 ALT 6 AST 13 alkaline phos.  111 bilirubin 0.5 albumin 3.4 protein 6    8/26/2024 vitamin D 25-hydroxy level 39 wnl    CBC: Date: 9/2/2024 WBC 4.9 Hgb 12.8 hematocrit 41.8 platelets 338    BMP: Date: 9/16/2024 Na 141 K 3.3 Cr 0.5 BUN 24 glucose 73 Ca 9.1   CBC: Date: 9/16/2024 WBC 7.2 Hgb 12.1 hematocrit 37.2 platelets 309  Liver function: Date: 9/16/2024 ALT 7 AST 14 alkaline phos.  93 bilirubin 0.5 albumin 3.4 protein 5.9    BMP: Date: 9/23/2024 Na 150 K 3.3 Cr 0.4 BUN 20 glucose 77 Ca 9.1   CBC: Date: 9/23/2024 WBC 5.6 Hgb 11.9 hematocrit 36.3 platelets 366  Liver function: Date: 9/23/2024 ALT 11 AST 13 alkaline phos.  80 bilirubin 0.5 albumin 3.1 protein  "5.7                  BP (!) 128/96   Pulse 60   Temp 36.3 °C (97.4 °F)   Resp 16   Ht 1.575 m (5' 2\")   Wt (!) 40.1 kg (88 lb 6.4 oz)   SpO2 95%   BMI 16.17 kg/m²      Physical Exam  Vitals and nursing note reviewed.   Constitutional:       Appearance: Normal appearance.   HENT:      Head: Normocephalic.      Right Ear: External ear normal.      Left Ear: External ear normal.      Nose: Nose normal.      Mouth/Throat:      Mouth: Mucous membranes are moist.      Pharynx: Oropharynx is clear.   Eyes:      Extraocular Movements: Extraocular movements intact.      Conjunctiva/sclera: Conjunctivae normal.      Pupils: Pupils are equal, round, and reactive to light.   Cardiovascular:      Rate and Rhythm: Normal rate and regular rhythm.      Pulses: Normal pulses.      Heart sounds: Normal heart sounds.   Pulmonary:      Effort: Pulmonary effort is normal.      Breath sounds: Normal breath sounds.   Abdominal:      General: Bowel sounds are normal.      Palpations: Abdomen is soft.   Musculoskeletal:         General: Normal range of motion.      Cervical back: Normal range of motion and neck supple.      Comments: Generalized muscle weakness; impaired gait and mobility   Skin:     General: Skin is warm and dry.   Neurological:      Mental Status: She is alert. Mental status is at baseline. She is disoriented.      Motor: Weakness present.      Coordination: Coordination abnormal.      Gait: Gait abnormal.   Psychiatric:         Mood and Affect: Affect is inappropriate.         Speech: Speech is tangential.         Cognition and Memory: Cognition is impaired. Memory is impaired.      Comments: Dementia   A x O x 1          Assessment/Plan    Ordered BMP and CBC with diff. For tomorrow.    Covid-19:   Finished with isolation.   Patient recently tested positive for Covid-19 on 9/16/24.  Monitor for s/s of long Covid.  Monitor for cough.  Monitor for fevers.     Hypokalemia:  Give potassium 20 meq PO x one now.  Start " potassium 20 meq PO every day routinely.\  Ordered BMP for tomorrow.  Recheck potassium level tomorrow.         Hypernatremia:  Start 240 ml PO oral fluids (water) TID for 5 days.  Recheck sodium level tomorrow.         Weight loss; Major depressive disorder; Alzheimer's disease; Dementia:     Continue mirtazapine and donepezil.  Patient was recently admitted to [facility] palliative program.   Monitor weights.   Also on house supplements and shakes TID.        Immobility; Generalized muscle weakness; Sequela of CVA; left sided weakness:  Patient had a stroke in October, 2020.  Continue Plavix.  Requires full supportive care.   Continue to use wheelchair.     Constipation:  Continue Miralax PO every day.  Monitor for constipation.             Problem List Items Addressed This Visit       Dementia     Other Visit Diagnoses       COVID-19    -  Primary    Hypokalemia        Hypernatremia        Weight loss        Recurrent major depressive disorder, remission status unspecified (CMS-HCC)        Immobility        Generalized muscle weakness        Sequela of cerebrovascular accident        Left-sided weakness        Constipation, unspecified constipation type                        YIMI Jordan       Electronically Signed By: YIMI Jordan   10/5/24  7:22 PM

## 2024-10-05 VITALS
DIASTOLIC BLOOD PRESSURE: 96 MMHG | SYSTOLIC BLOOD PRESSURE: 128 MMHG | HEIGHT: 62 IN | OXYGEN SATURATION: 95 % | HEART RATE: 60 BPM | TEMPERATURE: 97.4 F | RESPIRATION RATE: 16 BRPM | BODY MASS INDEX: 16.27 KG/M2 | WEIGHT: 88.4 LBS

## 2024-10-05 NOTE — PROGRESS NOTES
Name: Shonda Solano    : 3/14/1934    Code Status:  DNR-cc    Chief Complaint:  Follow up on Covid-19;  etc....    HPI   89 year old female patient at [facility] in [facility].       Medical history includes: Difficulty in walking; muscle weakness (generalized); Acquired hammer toes on the right & left foot; Personal history of other infectious and parasitic diseases; disorientation; Alzheimer's disease; unspecified; symbolic dysfunctions; other speech and language deficits following unspecified cerebrovascular disease; mood disorder due to known physiological condition, unspecified; Bipolar disorder; Displaced intertrochanteric fracture of right femur; need for assistance with personal care; chronic kidney disease; primary osteoarthritis; unspecified ankle and foot; polyneuropathy, unspecified; unspecified abnormalities of gait and mobility; other osteoporosis without current pathological fracture.      Diagnoses as follows:    Covid-19:  Patient recently tested positive for Covid-19 on 24.  She was coughing.  Antiviral Molnupiravir 800 mg PO Q 12 hours for 4 days and mucinex Q 6 PRN ordered for patient.   Patient's symptoms improved.  No complaints or reports of cough.  Patient is now finished with her Covid isolation.  Patient seen today.  She is sitting up in a wheelchair.  Calm, cooperative.  Mentation at baseline.   No fevers reported.  No chest pain.  No headaches or dizziness.     Hypokalemia:  Recent potassium levels as follows:  24 potassium 3.3 L  24 potassium 3.3 L    Hypernatremia:  On 24 sodium 150 H         Weight loss; Major depressive disorder; Alzheimer's disease; Dementia:              On mirtazapine and donepezil.  Patient was recently admitted to [facility] palliative program.   Also follows up with in-house psych NP at .  No agitation reported.   Weights are trending down.  Recent weights as follows:  23 110.8 #  23 107.4 #  10/6/23  106.2 #  23 105.2 #  23 103 #  24 102.4 #  24 102.4 #  3/6/24 100 #  4/3/24 103.2 #  24 97.6 #  24 96.4 #  7/3/24 97.5 #  24 94 #  24 94.6 #  10/1/24 88.4 #   Also on house supplements and shakes TID.        Immobility; Generalized muscle weakness; Sequela of CVA; left sided weakness:  Patient had a stroke in .  On  Plavix.  Requires full supportive care.   Very weak.  In wheelchair.    Constipation:  On Miralax PO every day.  No complaints of constipation.                            Reviewed  EMR  Reviewed medical, social, surgical and family history.  Reviewed all current medications and performed medication reconciliation.  Performed prescription drug management.  Reviewed vital signs AND lab results  Reviewed [facility] Documentation  Discussed patient with nursing.       ROS: Limited ROS due to mentation; ROS negative unless noted in HPI.     Surgical History  Problems    · History of Femur fracture repair   · History of Tonsillectomy     Family History  Mother    · Family history of   Father    · Family history of      Social History   · Never a smoker  No alcohol  No illicit drugs  Lives in long term care facility              Past Medical History:   Diagnosis Date    Other malaise 2020    Physical deconditioning    Personal history of other diseases of the digestive system     History of constipation    Personal history of other diseases of the nervous system and sense organs 2020    History of polyneuropathy    Personal history of urinary (tract) infections     History of urinary tract infection    Unspecified dementia, unspecified severity, without behavioral disturbance, psychotic disturbance, mood disturbance, and anxiety (Multi) 12/10/2022    Dementia    Unspecified diastolic (congestive) heart failure (Multi)     Diastolic heart failure    Unspecified fracture of right femur, initial encounter for closed fracture  (Multi)     Femur fracture, right             Lab Results   Component Value Date    WBC 6.8 10/02/2024    HGB 12.7 10/02/2024    HCT 40.1 10/02/2024     (H) 10/02/2024    ALT 8 08/12/2023    AST 14 08/12/2023     10/02/2024    K 4.4 10/02/2024     10/02/2024    CREATININE 0.53 10/02/2024    BUN 19 10/02/2024    CO2 28 10/02/2024    TSH 2.92 10/14/2020    INR 1.0 02/20/2022    HGBA1C 5.9 10/15/2020     Outside Labs  CBC: Date: 10/12/22, WBC: 4.8, Hgb: 11.9, Hct: 35.2, PLT: 290   CBC: Date: 7/7/22, WBC: 3.9, Hgb: 12.7, Hct: 38.5, PLT: 307   BMP: Date: 10/12/22, Na: 140, K: 4.3, Cl: 107, CO2: 25, BUN: 25, Cr: 0.6, Glu: 80, Ca: 9   BMP: Date: 7/7/22, Na: 141, K: 4.6, Cl: 105, CO2: 26, BUN: 30, Cr: 0.7, Glu: 113, Ca: 9.1   Hepatic Function Tests: Date: 3/8/22, AST: 15, ALT: 11, Alk Phos: 114, T Bili: 0.4, Albumin: 3.6   Hepatic Function Tests: Date: 3/9/21, AST: 15, ALT: 14, Alk Phos: 101, T Bili: 0.6, Albumin: 3.3   Lipid panel on 10/13/20 as follows: Cholesterol 186; triglyceride 84; HDL 51; ; VLDL 17.    Vitamin D 25 hydroxy level on 10/13/20 22 L    BMP: Date: 1/10/2023 Na 142 K 4.4 Cr 0.7 BUN 27 glucose 77 Ca 9.2 GFR 79  CBC: Date: 1/10/2023 WBC 6.9 Hgb 12.8 hematocrit 38.4 platelets 398  Liver function: Date: 1/10/2023 ALT 11 AST 18 alkaline phos.  112 bilirubin 0.5 albumin 3.7 protein 6.4    BMP: Date: 4/18/2023 Na 145 K 4 Cr 0.7 BUN 25 glucose 85 Ca 8.8 GFR 79  CBC: Date: 4/18/2023 WBC 5.2 Hgb 12.6 hematocrit 38.3 platelets 298  Liver function: Date: 4/18/2023 ALT 6 AST 11 alkaline phos.  94 bilirubin 0.5 albumin 3.4 protein 5.7   4/18/23 vitamin D 25 hydroxy level 19.62 L    BMP: Date: 8/8/2023 Na 142 K 4.9 Cr 0.6 BUN 24 glucose 65 Ca 9.2 GFR 94  CBC: Date: 8/8/2023 WBC 5.4 Hgb 12.8 hematocrit 40.4 platelets 398  Liver function: Date: 8/8/2023 ALT 8 AST 18 alkaline phos.  108 bilirubin 0.6 albumin 3.7 protein 6.4    8/8/23 vitamin D hydroxy level 29.92 L    BMP: Date: 10/6/2023 Na 142 K  4.5 Cr 0.7 BUN 28 glucose 86 Ca 9.8 GFR 79  CBC: Date: 10/6/2023 WBC 6.2 Hgb 12.7 hematocrit 39.3 platelets 358    10/6/2023: TSH 2.652 within normal limits (range 0.340-5.500)    10/6/2023: Vitamin D 25-hydroxy level 32.58 within normal limits (range )    11/2/2023: Vitamin D 25-hydroxy level 33.84    BMP: Date: 12/4/2023 Na 143 K 4.3 Cr 0.6 BUN 23 glucose 76 Ca 9.8 GFR 94  Liver function: date: 12/4/2023 ALT 10 AST 17 alkaline phos.  112 bilirubin 0.7 albumin 3.8 protein 6.5    12/4/2023 vitamin D 25-hydroxy level 56 wnl    BMP: Date: 12/11/2023 Na 140 K 4.9 Cr 0.6 BUN 21 glucose 55 Ca 9.1 GFR 94  CBC: Date: 12/13/2023 WBC 5.9 Hgb 12.6 hematocrit 39.32 platelets 364  Liver function: Date: 12/11/2023 ALT 8 AST 15 alkaline phos.  97 bilirubin 0.5 albumin 3.3 protein 5.6    12/11/2023 vitamin D hydroxy level 43 wnl    BMP: Date: 1/29/2024 Na 142 K 4.1 Cr 0.7 BUN 23 glucose 81 Ca 9.7 GFR 79  CBC: Date: 1/29/2024 WBC 6.7 Hgb 13.1 hematocrit 40.6 platelets 368  Liver function: Date: 1/29/2024 ALT 14 AST 21 alkaline phos.  131 bilirubin 0.8 albumin 4 protein 6.7    BMP: Date: 3/11/2024 Na 140 K 4.1 Cr 0.6 BUN 23 glucose 129 Ca 9.4 GFR 94  CBC: Date: 3/11/2024 WBC 6.3 Hgb 12.7 hematocrit 39 platelets 381  Liver function: Date: 3/11/2024 ALT 9 AST 14 alkaline phos.  119 bilirubin 0.5 albumin 3.5 protein 6.1    3/11/2024 vitamin D 25-hydroxy level: 35 ( )    BMP: Date: 4/8/2024 Na 143 K 4.1 Cr 0.7 BUN 28 glucose 110 Ca 10.2 GFR 95  Liver function: Date: 4/8/2024 ALT 11 AST 22 alkaline phos.  134 bilirubin 1 albumin 4.1 protein 7.2    BMP: Date: 4/18/2024 Na 141 K 4.2 Cr 0.5 BUN 22 glucose 78 Ca 9.6   CBC: Date: 4/18/2024 WBC 7.8 Hgb 13.9 hematocrit 42.9 platelets 444  Liver function: Date: 4/18/2024 ALT 9 AST 15 alkaline phos.  111 bilirubin 0.5 albumin 3.6 protein 6.1    4/18/2024 vitamin D 25-hydroxy level 35 (range )    BMP: Date: 4/22/2024 Na 144 K 4.3 Cr 0.5 BUN 18 glucose 97 Ca 9   CBC:  Date: 4/22/2024 WBC 6.5 Hgb 13.4 hematocrit 40.7 platelets 442  Liver function: Date: 4/22/2024 ALT 7 AST 13 alkaline phos.  98 bilirubin 0.6 albumin 3.3 protein 6    4/22/24 vitamin D 25 hydroxy level 34    BMP: Date: 6/17/2024 Na 143 K 4.3 Cr 0.5 BUN 20 glucose 61 Ca 9.1   CBC: Date: 6/17/2024 WBC 5.3 Hgb 12.8 hematocrit 35.1 platelets 316  Liver function: Date: 6/17/2024 ALT 6 AST 14 alkaline phos.  101 bilirubin 0.4 albumin 3.3 protein 5.6    6/17/2024 vitamin D 25-hydroxy level 33    BMP: Date: 8/1/2024 Na 142 K 4.4 Cr 0.5 BUN 29 glucose 77 Ca 9.2     BMP: Date: 8/12/2024 Na 145 K 4.3 Cr 0.6 BUN 26 glucose 50 Ca 9.7 GFR 94  CBC: Date: 8/12/2024 WBC 6.2 Hgb 13.8 hematocrit 43.5 platelets 435  Liver function: Date: 8/12/2024 ALT 7 AST 18 alkaline phos.  133 bilirubin 0.9 albumin 3.8 protein 7    BMP: Date: 8/19/2024 Na 144 K 4.3 Cr 0.6 BUN 28 glucose 94 Ca 9.4 GFR 94  CBC: Date: 8/19/2024 WBC 6.7 Hgb 12.2 hematocrit 37.9 platelets 363  Liver function: Date: 8/19/2024 ALT 9 AST 14 alkaline phos.  109 bilirubin 0.6 albumin 3.7 protein 6.3    BMP: Date: 8/26/2024 Na 146 K 3.8 Cr 0.5 BUN 24 glucose 58 Ca 9.1   Liver function: Date: 8/26/2024 ALT 6 AST 13 alkaline phos.  111 bilirubin 0.5 albumin 3.4 protein 6    8/26/2024 vitamin D 25-hydroxy level 39 wnl    CBC: Date: 9/2/2024 WBC 4.9 Hgb 12.8 hematocrit 41.8 platelets 338    BMP: Date: 9/16/2024 Na 141 K 3.3 Cr 0.5 BUN 24 glucose 73 Ca 9.1   CBC: Date: 9/16/2024 WBC 7.2 Hgb 12.1 hematocrit 37.2 platelets 309  Liver function: Date: 9/16/2024 ALT 7 AST 14 alkaline phos.  93 bilirubin 0.5 albumin 3.4 protein 5.9    BMP: Date: 9/23/2024 Na 150 K 3.3 Cr 0.4 BUN 20 glucose 77 Ca 9.1   CBC: Date: 9/23/2024 WBC 5.6 Hgb 11.9 hematocrit 36.3 platelets 366  Liver function: Date: 9/23/2024 ALT 11 AST 13 alkaline phos.  80 bilirubin 0.5 albumin 3.1 protein 5.7                  BP (!) 128/96   Pulse 60   Temp 36.3 °C (97.4 °F)   Resp 16    "Ht 1.575 m (5' 2\")   Wt (!) 40.1 kg (88 lb 6.4 oz)   SpO2 95%   BMI 16.17 kg/m²      Physical Exam  Vitals and nursing note reviewed.   Constitutional:       Appearance: Normal appearance.   HENT:      Head: Normocephalic.      Right Ear: External ear normal.      Left Ear: External ear normal.      Nose: Nose normal.      Mouth/Throat:      Mouth: Mucous membranes are moist.      Pharynx: Oropharynx is clear.   Eyes:      Extraocular Movements: Extraocular movements intact.      Conjunctiva/sclera: Conjunctivae normal.      Pupils: Pupils are equal, round, and reactive to light.   Cardiovascular:      Rate and Rhythm: Normal rate and regular rhythm.      Pulses: Normal pulses.      Heart sounds: Normal heart sounds.   Pulmonary:      Effort: Pulmonary effort is normal.      Breath sounds: Normal breath sounds.   Abdominal:      General: Bowel sounds are normal.      Palpations: Abdomen is soft.   Musculoskeletal:         General: Normal range of motion.      Cervical back: Normal range of motion and neck supple.      Comments: Generalized muscle weakness; impaired gait and mobility   Skin:     General: Skin is warm and dry.   Neurological:      Mental Status: She is alert. Mental status is at baseline. She is disoriented.      Motor: Weakness present.      Coordination: Coordination abnormal.      Gait: Gait abnormal.   Psychiatric:         Mood and Affect: Affect is inappropriate.         Speech: Speech is tangential.         Cognition and Memory: Cognition is impaired. Memory is impaired.      Comments: Dementia   A x O x 1          Assessment/Plan     Ordered BMP and CBC with diff. For tomorrow.    Covid-19:   Finished with isolation.   Patient recently tested positive for Covid-19 on 9/16/24.  Monitor for s/s of long Covid.  Monitor for cough.  Monitor for fevers.     Hypokalemia:  Give potassium 20 meq PO x one now.  Start potassium 20 meq PO every day routinely.\  Ordered BMP for tomorrow.  Recheck " potassium level tomorrow.         Hypernatremia:  Start 240 ml PO oral fluids (water) TID for 5 days.  Recheck sodium level tomorrow.         Weight loss; Major depressive disorder; Alzheimer's disease; Dementia:     Continue mirtazapine and donepezil.  Patient was recently admitted to [facility] palliative program.   Monitor weights.   Also on house supplements and shakes TID.        Immobility; Generalized muscle weakness; Sequela of CVA; left sided weakness:  Patient had a stroke in October, 2020.  Continue Plavix.  Requires full supportive care.   Continue to use wheelchair.     Constipation:  Continue Miralax PO every day.  Monitor for constipation.             Problem List Items Addressed This Visit       Dementia     Other Visit Diagnoses       COVID-19    -  Primary    Hypokalemia        Hypernatremia        Weight loss        Recurrent major depressive disorder, remission status unspecified (CMS-HCC)        Immobility        Generalized muscle weakness        Sequela of cerebrovascular accident        Left-sided weakness        Constipation, unspecified constipation type                        Sandi Rudolph, APRN-CNP

## 2024-10-08 ENCOUNTER — NURSING HOME VISIT (OUTPATIENT)
Dept: POST ACUTE CARE | Facility: EXTERNAL LOCATION | Age: 89
End: 2024-10-08
Payer: COMMERCIAL

## 2024-10-08 DIAGNOSIS — K59.00 CONSTIPATION, UNSPECIFIED CONSTIPATION TYPE: ICD-10-CM

## 2024-10-08 DIAGNOSIS — D64.9 ANEMIA, UNSPECIFIED TYPE: ICD-10-CM

## 2024-10-08 DIAGNOSIS — F02.C18 SEVERE ALZHEIMER'S DEMENTIA WITH OTHER BEHAVIORAL DISTURBANCE, UNSPECIFIED TIMING OF DEMENTIA ONSET (MULTI): ICD-10-CM

## 2024-10-08 DIAGNOSIS — R63.4 WEIGHT LOSS: ICD-10-CM

## 2024-10-08 DIAGNOSIS — G30.9 SEVERE ALZHEIMER'S DEMENTIA WITH OTHER BEHAVIORAL DISTURBANCE, UNSPECIFIED TIMING OF DEMENTIA ONSET (MULTI): ICD-10-CM

## 2024-10-08 DIAGNOSIS — M62.81 GENERALIZED MUSCLE WEAKNESS: ICD-10-CM

## 2024-10-08 DIAGNOSIS — I69.30 SEQUELA OF CEREBROVASCULAR ACCIDENT: ICD-10-CM

## 2024-10-08 DIAGNOSIS — F33.9 RECURRENT MAJOR DEPRESSIVE DISORDER, REMISSION STATUS UNSPECIFIED (CMS-HCC): ICD-10-CM

## 2024-10-08 DIAGNOSIS — E87.6 HYPOKALEMIA: Primary | ICD-10-CM

## 2024-10-08 DIAGNOSIS — U07.1 COVID-19: ICD-10-CM

## 2024-10-08 DIAGNOSIS — E87.0 HYPERNATREMIA: ICD-10-CM

## 2024-10-08 DIAGNOSIS — R53.1 LEFT-SIDED WEAKNESS: ICD-10-CM

## 2024-10-08 DIAGNOSIS — Z74.09 IMMOBILITY: ICD-10-CM

## 2024-10-08 PROCEDURE — 99309 SBSQ NF CARE MODERATE MDM 30: CPT | Performed by: NURSE PRACTITIONER

## 2024-10-08 NOTE — LETTER
Patient : Tony Gibson Age: 36 year old Sex: male   MRN: [id number] Encounter Date: 6/5/2020      History     Chief Complaint   Patient presents with   • Drug Problem   • Psychiatric Problem   • Alcohol Problem     HPI:  36-year-old male has a known history of polysubstance abuse.  He states that his last use was cocaine just this morning.  The patient states that he wants to stop using drugs and he came in for help.  The patient got out of [facility] in March.  His wife told him that she would not except him back in the home until he cleaned himself up.  The patient at this time states that he wants to become clean for his family.  He states that he feels very hopeless right now.  He has had some thoughts of suicide but he does not have an active plan.    The patient states that he had seen a counselor when he was 15 years old but has not seen any counselors since.    No Known Allergies    There are no discharge medications for this patient.      Past Medical History:   Diagnosis Date   • Anxiety    • Depression    • Substance abuse (CMS/[facility])    • TBI (traumatic brain injury) (CMS/[facility])        No past surgical history on file.    No family history on file.    Social History     Tobacco Use   • Smoking status: Never Smoker   • Smokeless tobacco: Current User     Types: Chew   Substance Use Topics   • Alcohol use: Yes     Comment: daily \"a lot\" 1/5th and a 4 pack day   • Drug use: Yes     Types: Cocaine, Marijuana, Methamphetamines, Heroin, IV     Comment: daily states \"I do everything I do it all       Review of Systems   Constitutional: Negative for fever.   HENT: Negative for trouble swallowing. Sneezing: reports multiple episodes over the past few days.     Respiratory: Positive for shortness of breath (\"when I used to smoke drugs\"  None presently).    Cardiovascular: Negative for chest pain.   Gastrointestinal: Positive for diarrhea.   Skin: Negative for rash.   Neurological: Negative for weakness.  Patient: Shonda Solano  : 3/14/1934    Encounter Date: 10/08/2024    Name: Shonda Solano    : 3/14/1934    Code Status:  DNR-cc    Chief Complaint:  Follow up on hypokalemia; etc....    HPI   89 year old female patient at [facility] in Long Term Care.       Medical history includes: Difficulty in walking; muscle weakness (generalized); Acquired hammer toes on the right & left foot; Personal history of other infectious and parasitic diseases; disorientation; Alzheimer's disease; unspecified; symbolic dysfunctions; other speech and language deficits following unspecified cerebrovascular disease; mood disorder due to known physiological condition, unspecified; Bipolar disorder; Displaced intertrochanteric fracture of right femur; need for assistance with personal care; chronic kidney disease; primary osteoarthritis; unspecified ankle and foot; polyneuropathy, unspecified; unspecified abnormalities of gait and mobility; other osteoporosis without current pathological fracture.      Diagnoses as follows:    Hypokalemia:   On 10/1/24 patient was treated with KCL 20 meq PO x 1 and also started on KCL 20 meq PO every day routinely.  Recent potassium levels as follows:  24 potassium 3.3 L  24 potassium 3.3 L  10/3/24 potassium 4.7  10/7/24 potassium 4.6  Potassium has increased to wnl on 10/3 and 10/7.  Patient seen today.  She is in bed.  Calm, cooperative.  Mentation at baseline.   No fevers reported.  No chest pain.  No headaches or dizziness.          Hypernatremia:  Recent sodium levels:  24 sodium 150 H  10/3/24 sodium 139  10/7/24 sodium 142   On 10/1/24 patient had orders for 240 ml po oral fluids (water) TID for 5 days.  Patient recently had Covid 19.  Discussed the importance of patient drinking adequately with patient and nursing.         Covid-19:  Patient  tested positive for Covid-19 on 24.  At that time she was coughing.  Antiviral Molnupiravir 800 mg PO Q 12    Psychiatric/Behavioral: Positive for hallucinations (depends on amount of drug use ). Negative for confusion.       Physical Exam     ED Triage Vitals [06/05/20 1451]   ED Triage Vitals Group      Temp 98 °F (36.7 °C)      Heart Rate 91      Resp 18      BP (!) 144/97      SpO2 97 %      EtCO2 mmHg       Height 5' 9\" (1.753 m)      Weight 200 lb (90.7 kg)      Weight Scale Used ED Actual      BMI (Calculated) 29.53      IBW/kg (Calculated) 70.7       Physical Exam   Constitutional: He is oriented to person, place, and time. He appears well-developed and well-nourished. No distress.   HENT:   mmm   Cardiovascular: Normal rate and regular rhythm.   Pulmonary/Chest: Effort normal and breath sounds normal.   Abdominal: Soft. He exhibits no distension. There is no abdominal tenderness.   Musculoskeletal:         General: No edema.   Neurological: He is alert and oriented to person, place, and time. Coordination normal.   Skin: Skin is warm.   Tan-red skin from sun exposure.  No blistering.     Psychiatric: His behavior is normal. His mood appears anxious. His speech is rapid and/or pressured. He expresses impulsivity. He expresses no suicidal ideation. He exhibits normal remote memory.   Nursing note and vitals reviewed.      ED Course     Procedures    Lab Results     Results for orders placed or performed during the hospital encounter of 06/05/20   Drug Abuse Screen, Urine   Result Value Ref Range    Amphetamines, Urine Negative Negative    Barbiturates, Urine Negative Negative    Benzodiazepines, Urine Negative Negative    Cocaine/ Metabolite, Urine Positive (A) Negative    Opiates, Urine Negative Negative    Phencyclidine, Urine Negative Negative    Cannabinoids, Urine Negative Negative   Comprehensive Metabolic Panel   Result Value Ref Range    Fasting Status      Sodium 136 135 - 145 mmol/L    Potassium 3.2 (L) 3.4 - 5.1 mmol/L    Chloride 97 (L) 98 - 107 mmol/L    Carbon Dioxide 26 21 - 32 mmol/L    Anion Gap 16  hours for 4 days and mucinex Q 6 PRN were ordered for patient.   Patient's symptoms improved.  No complaints or reports of cough.  Patient is finished with her Covid isolation.  No fevers reported.       Dementia; Alzheimer's disease; Major depressive disorder; Weight loss              On mirtazapine and donepezil.  Patient was recently admitted to [facility] palliative program.   Also follows up with in-house psych NP at .  No agitation reported.   Weights are trending down.  Recent weights as follows:  23 107.4 #  10/6/23 106.2 #  23 105.2 #  23 103 #  24 102.4 #  24 102.4 #  3/6/24 100 #  4/3/24 103.2 #  24 97.6 #  24 96.4 #  7/3/24 97.5 #  24 94 #  24 94.6 #  10/1/24 88.4 #   Also on house supplements and shakes QID.       Sequela of CVA;  Immobility; Generalized muscle weakness;  left sided weakness:  Patient had a stroke in .  On  Plavix.  Requires full supportive care.   Very weak.  In wheelchair.    Anemia:  Improved.  On ferrous sulfate 325 mg po every day.  10/7/24 H & H 12.2 37.7    Constipation:  On Miralax PO every day.  No complaints of constipation.                              Reviewed  EMR  Reviewed medical, social, surgical and family history.  Reviewed all current medications and performed medication reconciliation.  Performed prescription drug management.  Reviewed vital signs AND lab results  Reviewed [facility] Documentation  Discussed patient with nursing.       ROS: Limited ROS due to mentation; ROS negative unless noted in HPI.     Surgical History  Problems    · History of Femur fracture repair   · History of Tonsillectomy     Family History  Mother    · Family history of   Father    · Family history of      Social History   · Never a smoker  No alcohol  No illicit drugs  Lives in long term care facility              Past Medical History:   Diagnosis Date   • Other malaise 2020    Physical deconditioning    • Personal history of other diseases of the digestive system     History of constipation   • Personal history of other diseases of the nervous system and sense organs 12/08/2020    History of polyneuropathy   • Personal history of urinary (tract) infections     History of urinary tract infection   • Unspecified dementia, unspecified severity, without behavioral disturbance, psychotic disturbance, mood disturbance, and anxiety (Multi) 12/10/2022    Dementia   • Unspecified diastolic (congestive) heart failure (Multi)     Diastolic heart failure   • Unspecified fracture of right femur, initial encounter for closed fracture (Multi)     Femur fracture, right             Lab Results   Component Value Date    WBC 6.8 10/02/2024    HGB 12.7 10/02/2024    HCT 40.1 10/02/2024     (H) 10/02/2024    ALT 8 08/12/2023    AST 14 08/12/2023     10/02/2024    K 4.4 10/02/2024     10/02/2024    CREATININE 0.53 10/02/2024    BUN 19 10/02/2024    CO2 28 10/02/2024    TSH 2.92 10/14/2020    INR 1.0 02/20/2022    HGBA1C 5.9 10/15/2020     Outside Labs  CBC: Date: 10/12/22, WBC: 4.8, Hgb: 11.9, Hct: 35.2, PLT: 290   CBC: Date: 7/7/22, WBC: 3.9, Hgb: 12.7, Hct: 38.5, PLT: 307   BMP: Date: 10/12/22, Na: 140, K: 4.3, Cl: 107, CO2: 25, BUN: 25, Cr: 0.6, Glu: 80, Ca: 9   BMP: Date: 7/7/22, Na: 141, K: 4.6, Cl: 105, CO2: 26, BUN: 30, Cr: 0.7, Glu: 113, Ca: 9.1   Hepatic Function Tests: Date: 3/8/22, AST: 15, ALT: 11, Alk Phos: 114, T Bili: 0.4, Albumin: 3.6   Hepatic Function Tests: Date: 3/9/21, AST: 15, ALT: 14, Alk Phos: 101, T Bili: 0.6, Albumin: 3.3   Lipid panel on 10/13/20 as follows: Cholesterol 186; triglyceride 84; HDL 51; ; VLDL 17.    Vitamin D 25 hydroxy level on 10/13/20 22 L    BMP: Date: 1/10/2023 Na 142 K 4.4 Cr 0.7 BUN 27 glucose 77 Ca 9.2 GFR 79  CBC: Date: 1/10/2023 WBC 6.9 Hgb 12.8 hematocrit 38.4 platelets 398  Liver function: Date: 1/10/2023 ALT 11 AST 18 alkaline phos.  112 bilirubin 0.5  10 - 20 mmol/L    Glucose 118 (H) 65 - 99 mg/dL    BUN 8 6 - 20 mg/dL    Creatinine 0.85 0.67 - 1.17 mg/dL    Glomerular Filtration Rate >90 >90    BUN/ Creatinine Ratio 9 7 - 25    Bilirubin, Total 0.3 0.2 - 1.0 mg/dL    GOT/AST 51 (H) <=37 Units/L    Alkaline Phosphatase 102 45 - 117 Units/L    Albumin 4.4 3.6 - 5.1 g/dL    Protein, Total 8.3 (H) 6.4 - 8.2 g/dL    Globulin 3.9 2.0 - 4.0 g/dL    A/G Ratio 1.1 1.0 - 2.4    GPT/ALT 49 <64 Units/L    Calcium 9.8 8.4 - 10.2 mg/dL   Alcohol   Result Value Ref Range    Alcohol 237 (H) <3 mg/dL   Acetaminophen Level   Result Value Ref Range    Acetaminophen <2 (L) 10 - 30 mcg/mL   Salicylate Level   Result Value Ref Range    Salicylate 3.6 <=30.0 mg/dL   URINALYSIS & REFLEX MICRO WITH CULTURE IF INDICATED   Result Value Ref Range    COLOR, URINALYSIS Straw     APPEARANCE, URINALYSIS Clear     GLUCOSE, URINALYSIS Negative Negative mg/dL    BILIRUBIN, URINALYSIS Negative Negative    KETONES, URINALYSIS Negative Negative mg/dL    SPECIFIC GRAVITY, URINALYSIS <1.005 (L) 1.005 - 1.030    OCCULT BLOOD, URINALYSIS Small (A) Negative    PH, URINALYSIS 7.0 5.0 - 7.0    PROTEIN, URINALYSIS Negative Negative mg/dL    UROBILINOGEN, URINALYSIS 0.2 0.2, 1.0 mg/dL    NITRITE, URINALYSIS Negative Negative    LEUKOCYTE ESTERASE, URINALYSIS Negative Negative   CBC with Automated Differential (performable only)   Result Value Ref Range    WBC 6.8 4.2 - 11.0 K/mcL    RBC 5.26 4.50 - 5.90 mil/mcL    HGB 15.5 13.0 - 17.0 g/dL    HCT 47.3 39.0 - 51.0 %    MCV 89.9 78.0 - 100.0 fl    MCH 29.5 26.0 - 34.0 pg    MCHC 32.8 32.0 - 36.5 g/dL    RDW-CV 15.3 (H) 11.0 - 15.0 %     140 - 450 K/mcL    NRBC 0 <=0 /100 WBC    Neutrophil, Percent 60 %    Lymphocytes, Percent 29 %    Mono, Percent 8 %    Eosinophils, Percent 2 %    Basophils, Percent 1 %    Immature Granulocytes 0 %    Absolute Neutrophils 4.1 1.8 - 7.7 K/mcL    Absolute Lymphocytes 2.0 1.0 - 4.8 K/mcL    Absolute Monocytes 0.5 0.3 -  0.9 K/mcL    Absolute Eosinophils  0.1 0.1 - 0.5 K/mcL    Absolute Basophils 0.0 0.0 - 0.3 K/mcL    Absolute Immmature Granulocytes 0.0 0.0 - 0.2 K/mcL    RDW-SD 51.1 (H) 39.0 - 50.0 fL   URINALYSIS MICROSCOPIC   Result Value Ref Range    SQUAMOUS EPITHELIAL, URINALYSIS None Seen None Seen, 1 to 5 /hpf    ERYTHROCYTES, URINALYSIS 1 to 2 None Seen, 1 to 2 /hpf    LEUKOCYTES, URINALYSIS 1 to 5 None Seen, 1 to 5 /hpf    BACTERIA, URINALYSIS None Seen None Seen /hpf    HYALINE CASTS, URINALYSIS None Seen None Seen, 1 to 5 /lpf   Rapid SARS-CoV-2 by PCR   Result Value Ref Range    Rapid SARS-COV-2 by PCR Not Detected Not Detected    Isolation Guidelines         EKG Results     EKG Interpretation 1531  Rate: 91  Rhythm: normal sinus rhythm   Abnormality: no    EKG tracing interpreted by ED physician    Radiology Results     Imaging Results    None         ED Medication Orders (From admission, onward)    Ordered Start     Status Ordering Provider    06/05/20 1801 06/05/20 1802  LORazepam (ATIVAN) injection 1 mg  ONCE      Last MAR action:  Given NICKELS, AURELIO A    06/05/20 1744 06/05/20 1745  nicotine (NICODERM) 14 MG/24HR patch 1 patch  ONCE      Last MAR action:  Patch Applied NICKELS, AURELIO A    06/05/20 1656 06/05/20 1657  potassium CHLORIDE (KLOR-CON M) gay ER tablet 60 mEq  ONCE      Last MAR action:  Given NICKELS, AURELIO A    06/05/20 1534 06/05/20 1535  sodium chloride (NORMAL SALINE) 0.9 % bolus 1,000 mL  ONCE      Last MAR action:  Completed NICKELS, AURELIO A               MDM:  36-year-old male wants help with his drug abuse.  He has pressured speech and appears anxious.  He admits to drug use today.  He also admits to alcohol use today.  The patient does not have any known history of alcohol withdrawal but he states he has been using regularly so he is not sure if he would withdraw.  He is not demonstrating signs of alcohol withdrawal right now.    The patient is insured.  I will have him speak with  albumin 3.7 protein 6.4    BMP: Date: 4/18/2023 Na 145 K 4 Cr 0.7 BUN 25 glucose 85 Ca 8.8 GFR 79  CBC: Date: 4/18/2023 WBC 5.2 Hgb 12.6 hematocrit 38.3 platelets 298  Liver function: Date: 4/18/2023 ALT 6 AST 11 alkaline phos.  94 bilirubin 0.5 albumin 3.4 protein 5.7   4/18/23 vitamin D 25 hydroxy level 19.62 L    BMP: Date: 8/8/2023 Na 142 K 4.9 Cr 0.6 BUN 24 glucose 65 Ca 9.2 GFR 94  CBC: Date: 8/8/2023 WBC 5.4 Hgb 12.8 hematocrit 40.4 platelets 398  Liver function: Date: 8/8/2023 ALT 8 AST 18 alkaline phos.  108 bilirubin 0.6 albumin 3.7 protein 6.4    8/8/23 vitamin D hydroxy level 29.92 L    BMP: Date: 10/6/2023 Na 142 K 4.5 Cr 0.7 BUN 28 glucose 86 Ca 9.8 GFR 79  CBC: Date: 10/6/2023 WBC 6.2 Hgb 12.7 hematocrit 39.3 platelets 358    10/6/2023: TSH 2.652 within normal limits (range 0.340-5.500)    10/6/2023: Vitamin D 25-hydroxy level 32.58 within normal limits (range )    11/2/2023: Vitamin D 25-hydroxy level 33.84    BMP: Date: 12/4/2023 Na 143 K 4.3 Cr 0.6 BUN 23 glucose 76 Ca 9.8 GFR 94  Liver function: date: 12/4/2023 ALT 10 AST 17 alkaline phos.  112 bilirubin 0.7 albumin 3.8 protein 6.5    12/4/2023 vitamin D 25-hydroxy level 56 wnl    BMP: Date: 12/11/2023 Na 140 K 4.9 Cr 0.6 BUN 21 glucose 55 Ca 9.1 GFR 94  CBC: Date: 12/13/2023 WBC 5.9 Hgb 12.6 hematocrit 39.32 platelets 364  Liver function: Date: 12/11/2023 ALT 8 AST 15 alkaline phos.  97 bilirubin 0.5 albumin 3.3 protein 5.6    12/11/2023 vitamin D hydroxy level 43 wnl    BMP: Date: 1/29/2024 Na 142 K 4.1 Cr 0.7 BUN 23 glucose 81 Ca 9.7 GFR 79  CBC: Date: 1/29/2024 WBC 6.7 Hgb 13.1 hematocrit 40.6 platelets 368  Liver function: Date: 1/29/2024 ALT 14 AST 21 alkaline phos.  131 bilirubin 0.8 albumin 4 protein 6.7    BMP: Date: 3/11/2024 Na 140 K 4.1 Cr 0.6 BUN 23 glucose 129 Ca 9.4 GFR 94  CBC: Date: 3/11/2024 WBC 6.3 Hgb 12.7 hematocrit 39 platelets 381  Liver function: Date: 3/11/2024 ALT 9 AST 14 alkaline phos.  119 bilirubin 0.5 albumin  3.5 protein 6.1    3/11/2024 vitamin D 25-hydroxy level: 35 ( )    BMP: Date: 4/8/2024 Na 143 K 4.1 Cr 0.7 BUN 28 glucose 110 Ca 10.2 GFR 95  Liver function: Date: 4/8/2024 ALT 11 AST 22 alkaline phos.  134 bilirubin 1 albumin 4.1 protein 7.2    BMP: Date: 4/18/2024 Na 141 K 4.2 Cr 0.5 BUN 22 glucose 78 Ca 9.6   CBC: Date: 4/18/2024 WBC 7.8 Hgb 13.9 hematocrit 42.9 platelets 444  Liver function: Date: 4/18/2024 ALT 9 AST 15 alkaline phos.  111 bilirubin 0.5 albumin 3.6 protein 6.1    4/18/2024 vitamin D 25-hydroxy level 35 (range )    BMP: Date: 4/22/2024 Na 144 K 4.3 Cr 0.5 BUN 18 glucose 97 Ca 9   CBC: Date: 4/22/2024 WBC 6.5 Hgb 13.4 hematocrit 40.7 platelets 442  Liver function: Date: 4/22/2024 ALT 7 AST 13 alkaline phos.  98 bilirubin 0.6 albumin 3.3 protein 6    4/22/24 vitamin D 25 hydroxy level 34    BMP: Date: 6/17/2024 Na 143 K 4.3 Cr 0.5 BUN 20 glucose 61 Ca 9.1   CBC: Date: 6/17/2024 WBC 5.3 Hgb 12.8 hematocrit 35.1 platelets 316  Liver function: Date: 6/17/2024 ALT 6 AST 14 alkaline phos.  101 bilirubin 0.4 albumin 3.3 protein 5.6    6/17/2024 vitamin D 25-hydroxy level 33    BMP: Date: 8/1/2024 Na 142 K 4.4 Cr 0.5 BUN 29 glucose 77 Ca 9.2     BMP: Date: 8/12/2024 Na 145 K 4.3 Cr 0.6 BUN 26 glucose 50 Ca 9.7 GFR 94  CBC: Date: 8/12/2024 WBC 6.2 Hgb 13.8 hematocrit 43.5 platelets 435  Liver function: Date: 8/12/2024 ALT 7 AST 18 alkaline phos.  133 bilirubin 0.9 albumin 3.8 protein 7    BMP: Date: 8/19/2024 Na 144 K 4.3 Cr 0.6 BUN 28 glucose 94 Ca 9.4 GFR 94  CBC: Date: 8/19/2024 WBC 6.7 Hgb 12.2 hematocrit 37.9 platelets 363  Liver function: Date: 8/19/2024 ALT 9 AST 14 alkaline phos.  109 bilirubin 0.6 albumin 3.7 protein 6.3    BMP: Date: 8/26/2024 Na 146 K 3.8 Cr 0.5 BUN 24 glucose 58 Ca 9.1   Liver function: Date: 8/26/2024 ALT 6 AST 13 alkaline phos.  111 bilirubin 0.5 albumin 3.4 protein 6    8/26/2024 vitamin D 25-hydroxy level 39 wnl    CBC: Date:  tele psych who hopefully will help us with placement.    The patient was very talkative with his sitters throughout his stay in the emergency department.  I did give him 1 mg of IV Ativan because he was fairly hyperactive.  He states that this helped with his symptoms.  He was also given a nicotine patch as he had requested 1. The patient otherwise remained calm and comfortable throughout his stay here in the ER.    1747:  Awaiting a call back from tele psych    1926: Rapid covid is negative.  DR. Jin is accepting MD at [facility] in [place].  The patient is excited to receive treatment.        Clinical Impression     ED Diagnosis   1. Polysubstance abuse (CMS/[facility])     2. Homelessness         Disposition        Transfer to Another Facility 6/5/2020  7:30 PM  Tony Gibson should be transferred out to [facility]       Irma Garces PA-C  06/05/20 1932     "9/2/2024 WBC 4.9 Hgb 12.8 hematocrit 41.8 platelets 338    BMP: Date: 9/16/2024 Na 141 K 3.3 Cr 0.5 BUN 24 glucose 73 Ca 9.1   CBC: Date: 9/16/2024 WBC 7.2 Hgb 12.1 hematocrit 37.2 platelets 309  Liver function: Date: 9/16/2024 ALT 7 AST 14 alkaline phos.  93 bilirubin 0.5 albumin 3.4 protein 5.9    BMP: Date: 9/23/2024 Na 150 K 3.3 Cr 0.4 BUN 20 glucose 77 Ca 9.1   CBC: Date: 9/23/2024 WBC 5.6 Hgb 11.9 hematocrit 36.3 platelets 366  Liver function: Date: 9/23/2024 ALT 11 AST 13 alkaline phos.  80 bilirubin 0.5 albumin 3.1 protein 5.7    BMP: Date: 10/3/2024 Na 139 K 4.7 Cr 0.6 BUN 18 glucose 56 Ca 9.2 GFR 94  CBC: Date: 10/3/2024 WBC 4.4 Hgb 13.9 hematocrit 40 platelets 444    BMP: Date: 10/7/2024 Na 142 K 4.6 Cr 0.5 BUN 28 glucose 59 Ca 9.1   CBC: Date: 10/7/2024 WBC 5.3 Hgb 12.2 hematocrit 37.7 platelets 381  Liver function: Date: 10/7/2024 ALT 6 AST 11 alkaline phos.  90 bilirubin 0.3 albumin 3.2 protein 5.7                    BP (!) 128/96   Pulse 60   Temp 36.6 °C (97.9 °F)   Resp 16   Ht 1.575 m (5' 2\")   Wt (!) 40.1 kg (88 lb 6.4 oz)   SpO2 95%   BMI 16.17 kg/m²      Physical Exam  Vitals and nursing note reviewed.   Constitutional:       Appearance: Normal appearance.   HENT:      Head: Normocephalic.      Right Ear: External ear normal.      Left Ear: External ear normal.      Nose: Nose normal.      Mouth/Throat:      Mouth: Mucous membranes are moist.      Pharynx: Oropharynx is clear.   Eyes:      Extraocular Movements: Extraocular movements intact.      Conjunctiva/sclera: Conjunctivae normal.      Pupils: Pupils are equal, round, and reactive to light.   Cardiovascular:      Rate and Rhythm: Normal rate and regular rhythm.      Pulses: Normal pulses.      Heart sounds: Normal heart sounds.   Pulmonary:      Effort: Pulmonary effort is normal.      Breath sounds: Normal breath sounds.   Abdominal:      General: Bowel sounds are normal.      Palpations: Abdomen is soft. "   Musculoskeletal:         General: Normal range of motion.      Cervical back: Normal range of motion and neck supple.      Comments: Generalized muscle weakness; impaired gait and mobility   Skin:     General: Skin is warm and dry.   Neurological:      Mental Status: She is alert. Mental status is at baseline. She is disoriented.      Motor: Weakness present.      Coordination: Coordination abnormal.      Gait: Gait abnormal.   Psychiatric:         Mood and Affect: Affect is inappropriate.         Speech: Speech is tangential.         Cognition and Memory: Cognition is impaired. Memory is impaired.      Comments: Dementia   A x O x 1          Assessment/Plan    Follow up BMP and CBC with diff. In 2 weeks.     Hypokalemia:    Monitor potassium chloride.       DC KCL 20 meq PO every day order.         Hypernatremia:         Monitor sodium level.  Continue 240 ml po oral fluids (water) TID as ordered.  Discussed the importance of patient drinking adequately with patient and nursing.         Covid-19:  Patient tested positive for Covid-19 on 9/16/24.  Monitor for symptoms of long covid.  Monitor for cough/fevers/dehydration.      Dementia; Alzheimer's disease; Major depressive disorder; Weight loss              Continue mirtazapine and donepezil.  Monitor weights.  In house dietician to continue to monitor patient.    Continue house supplements and shakes QID.       Sequela of CVA;  Immobility; Generalized muscle weakness; left sided weakness:  Patient had a stroke in October, 2020.  Continue Plavix.  Requires full supportive care.   Very weak.  Continue to use wheelchair.    Anemia:  Continue ferrous sulfate 325 mg po every day.      Monitor H & H.    Constipation:  Continue Miralax PO every day.      Problem List Items Addressed This Visit       Dementia     Other Visit Diagnoses       Hypokalemia    -  Primary    Hypernatremia        COVID-19        Recurrent major depressive disorder, remission status unspecified  (CMS-[facility])        Weight loss        Sequela of cerebrovascular accident        Generalized muscle weakness        Left-sided weakness        Immobility        Anemia, unspecified type        Constipation, unspecified constipation type                          YIMI Jordan       Electronically Signed By: YIMI Jordan   10/12/24  7:41 PM

## 2024-10-12 VITALS
RESPIRATION RATE: 16 BRPM | WEIGHT: 88.4 LBS | TEMPERATURE: 97.9 F | HEIGHT: 62 IN | BODY MASS INDEX: 16.27 KG/M2 | SYSTOLIC BLOOD PRESSURE: 128 MMHG | OXYGEN SATURATION: 95 % | DIASTOLIC BLOOD PRESSURE: 96 MMHG | HEART RATE: 60 BPM

## 2024-10-12 NOTE — PROGRESS NOTES
Name: Shonda Solano    : 3/14/1934    Code Status:  DNR-cc    Chief Complaint:  Follow up on hypokalemia; etc....    HPI   89 year old female patient at [facility] in [facility].       Medical history includes: Difficulty in walking; muscle weakness (generalized); Acquired hammer toes on the right & left foot; Personal history of other infectious and parasitic diseases; disorientation; Alzheimer's disease; unspecified; symbolic dysfunctions; other speech and language deficits following unspecified cerebrovascular disease; mood disorder due to known physiological condition, unspecified; Bipolar disorder; Displaced intertrochanteric fracture of right femur; need for assistance with personal care; chronic kidney disease; primary osteoarthritis; unspecified ankle and foot; polyneuropathy, unspecified; unspecified abnormalities of gait and mobility; other osteoporosis without current pathological fracture.      Diagnoses as follows:    Hypokalemia:   On 10/1/24 patient was treated with KCL 20 meq PO x 1 and also started on KCL 20 meq PO every day routinely.  Recent potassium levels as follows:  24 potassium 3.3 L  24 potassium 3.3 L  10/3/24 potassium 4.7  10/7/24 potassium 4.6  Potassium has increased to wnl on 10/3 and 10/7.  Patient seen today.  She is in bed.  Calm, cooperative.  Mentation at baseline.   No fevers reported.  No chest pain.  No headaches or dizziness.          Hypernatremia:  Recent sodium levels:  24 sodium 150 H  10/3/24 sodium 139  10/7/24 sodium 142   On 10/1/24 patient had orders for 240 ml po oral fluids (water) TID for 5 days.  Patient recently had Covid 19.  Discussed the importance of patient drinking adequately with patient and nursing.         Covid-19:  Patient  tested positive for Covid-19 on 24.  At that time she was coughing.  Antiviral Molnupiravir 800 mg PO Q 12 hours for 4 days and mucinex Q 6 PRN were ordered for patient.   Patient's  symptoms improved.  No complaints or reports of cough.  Patient is finished with her Covid isolation.  No fevers reported.       Dementia; Alzheimer's disease; Major depressive disorder; Weight loss              On mirtazapine and donepezil.  Patient was recently admitted to [facility] palliative program.   Also follows up with in-house psych NP at .  No agitation reported.   Weights are trending down.  Recent weights as follows:  23 107.4 #  10/6/23 106.2 #  23 105.2 #  23 103 #  24 102.4 #  24 102.4 #  3/6/24 100 #  4/3/24 103.2 #  24 97.6 #  24 96.4 #  7/3/24 97.5 #  24 94 #  24 94.6 #  10/1/24 88.4 #   Also on house supplements and shakes QID.       Sequela of CVA;  Immobility; Generalized muscle weakness;  left sided weakness:  Patient had a stroke in .  On  Plavix.  Requires full supportive care.   Very weak.  In wheelchair.    Anemia:  Improved.  On ferrous sulfate 325 mg po every day.  10/7/24 H & H 12.2 37.7    Constipation:  On Miralax PO every day.  No complaints of constipation.                              Reviewed  EMR  Reviewed medical, social, surgical and family history.  Reviewed all current medications and performed medication reconciliation.  Performed prescription drug management.  Reviewed vital signs AND lab results  Reviewed [facility] Documentation  Discussed patient with nursing.       ROS: Limited ROS due to mentation; ROS negative unless noted in HPI.     Surgical History  Problems    · History of Femur fracture repair   · History of Tonsillectomy     Family History  Mother    · Family history of   Father    · Family history of      Social History   · Never a smoker  No alcohol  No illicit drugs  Lives in long term care facility              Past Medical History:   Diagnosis Date    Other malaise 2020    Physical deconditioning    Personal history of other diseases of the digestive system     History  of constipation    Personal history of other diseases of the nervous system and sense organs 12/08/2020    History of polyneuropathy    Personal history of urinary (tract) infections     History of urinary tract infection    Unspecified dementia, unspecified severity, without behavioral disturbance, psychotic disturbance, mood disturbance, and anxiety (Multi) 12/10/2022    Dementia    Unspecified diastolic (congestive) heart failure (Multi)     Diastolic heart failure    Unspecified fracture of right femur, initial encounter for closed fracture (Multi)     Femur fracture, right             Lab Results   Component Value Date    WBC 6.8 10/02/2024    HGB 12.7 10/02/2024    HCT 40.1 10/02/2024     (H) 10/02/2024    ALT 8 08/12/2023    AST 14 08/12/2023     10/02/2024    K 4.4 10/02/2024     10/02/2024    CREATININE 0.53 10/02/2024    BUN 19 10/02/2024    CO2 28 10/02/2024    TSH 2.92 10/14/2020    INR 1.0 02/20/2022    HGBA1C 5.9 10/15/2020     Outside Labs  CBC: Date: 10/12/22, WBC: 4.8, Hgb: 11.9, Hct: 35.2, PLT: 290   CBC: Date: 7/7/22, WBC: 3.9, Hgb: 12.7, Hct: 38.5, PLT: 307   BMP: Date: 10/12/22, Na: 140, K: 4.3, Cl: 107, CO2: 25, BUN: 25, Cr: 0.6, Glu: 80, Ca: 9   BMP: Date: 7/7/22, Na: 141, K: 4.6, Cl: 105, CO2: 26, BUN: 30, Cr: 0.7, Glu: 113, Ca: 9.1   Hepatic Function Tests: Date: 3/8/22, AST: 15, ALT: 11, Alk Phos: 114, T Bili: 0.4, Albumin: 3.6   Hepatic Function Tests: Date: 3/9/21, AST: 15, ALT: 14, Alk Phos: 101, T Bili: 0.6, Albumin: 3.3   Lipid panel on 10/13/20 as follows: Cholesterol 186; triglyceride 84; HDL 51; ; VLDL 17.    Vitamin D 25 hydroxy level on 10/13/20 22 L    BMP: Date: 1/10/2023 Na 142 K 4.4 Cr 0.7 BUN 27 glucose 77 Ca 9.2 GFR 79  CBC: Date: 1/10/2023 WBC 6.9 Hgb 12.8 hematocrit 38.4 platelets 398  Liver function: Date: 1/10/2023 ALT 11 AST 18 alkaline phos.  112 bilirubin 0.5 albumin 3.7 protein 6.4    BMP: Date: 4/18/2023 Na 145 K 4 Cr 0.7 BUN 25 glucose 85  Ca 8.8 GFR 79  CBC: Date: 4/18/2023 WBC 5.2 Hgb 12.6 hematocrit 38.3 platelets 298  Liver function: Date: 4/18/2023 ALT 6 AST 11 alkaline phos.  94 bilirubin 0.5 albumin 3.4 protein 5.7   4/18/23 vitamin D 25 hydroxy level 19.62 L    BMP: Date: 8/8/2023 Na 142 K 4.9 Cr 0.6 BUN 24 glucose 65 Ca 9.2 GFR 94  CBC: Date: 8/8/2023 WBC 5.4 Hgb 12.8 hematocrit 40.4 platelets 398  Liver function: Date: 8/8/2023 ALT 8 AST 18 alkaline phos.  108 bilirubin 0.6 albumin 3.7 protein 6.4    8/8/23 vitamin D hydroxy level 29.92 L    BMP: Date: 10/6/2023 Na 142 K 4.5 Cr 0.7 BUN 28 glucose 86 Ca 9.8 GFR 79  CBC: Date: 10/6/2023 WBC 6.2 Hgb 12.7 hematocrit 39.3 platelets 358    10/6/2023: TSH 2.652 within normal limits (range 0.340-5.500)    10/6/2023: Vitamin D 25-hydroxy level 32.58 within normal limits (range )    11/2/2023: Vitamin D 25-hydroxy level 33.84    BMP: Date: 12/4/2023 Na 143 K 4.3 Cr 0.6 BUN 23 glucose 76 Ca 9.8 GFR 94  Liver function: date: 12/4/2023 ALT 10 AST 17 alkaline phos.  112 bilirubin 0.7 albumin 3.8 protein 6.5    12/4/2023 vitamin D 25-hydroxy level 56 wnl    BMP: Date: 12/11/2023 Na 140 K 4.9 Cr 0.6 BUN 21 glucose 55 Ca 9.1 GFR 94  CBC: Date: 12/13/2023 WBC 5.9 Hgb 12.6 hematocrit 39.32 platelets 364  Liver function: Date: 12/11/2023 ALT 8 AST 15 alkaline phos.  97 bilirubin 0.5 albumin 3.3 protein 5.6    12/11/2023 vitamin D hydroxy level 43 wnl    BMP: Date: 1/29/2024 Na 142 K 4.1 Cr 0.7 BUN 23 glucose 81 Ca 9.7 GFR 79  CBC: Date: 1/29/2024 WBC 6.7 Hgb 13.1 hematocrit 40.6 platelets 368  Liver function: Date: 1/29/2024 ALT 14 AST 21 alkaline phos.  131 bilirubin 0.8 albumin 4 protein 6.7    BMP: Date: 3/11/2024 Na 140 K 4.1 Cr 0.6 BUN 23 glucose 129 Ca 9.4 GFR 94  CBC: Date: 3/11/2024 WBC 6.3 Hgb 12.7 hematocrit 39 platelets 381  Liver function: Date: 3/11/2024 ALT 9 AST 14 alkaline phos.  119 bilirubin 0.5 albumin 3.5 protein 6.1    3/11/2024 vitamin D 25-hydroxy level: 35 ( )    BMP: Date:  4/8/2024 Na 143 K 4.1 Cr 0.7 BUN 28 glucose 110 Ca 10.2 GFR 95  Liver function: Date: 4/8/2024 ALT 11 AST 22 alkaline phos.  134 bilirubin 1 albumin 4.1 protein 7.2    BMP: Date: 4/18/2024 Na 141 K 4.2 Cr 0.5 BUN 22 glucose 78 Ca 9.6   CBC: Date: 4/18/2024 WBC 7.8 Hgb 13.9 hematocrit 42.9 platelets 444  Liver function: Date: 4/18/2024 ALT 9 AST 15 alkaline phos.  111 bilirubin 0.5 albumin 3.6 protein 6.1    4/18/2024 vitamin D 25-hydroxy level 35 (range )    BMP: Date: 4/22/2024 Na 144 K 4.3 Cr 0.5 BUN 18 glucose 97 Ca 9   CBC: Date: 4/22/2024 WBC 6.5 Hgb 13.4 hematocrit 40.7 platelets 442  Liver function: Date: 4/22/2024 ALT 7 AST 13 alkaline phos.  98 bilirubin 0.6 albumin 3.3 protein 6    4/22/24 vitamin D 25 hydroxy level 34    BMP: Date: 6/17/2024 Na 143 K 4.3 Cr 0.5 BUN 20 glucose 61 Ca 9.1   CBC: Date: 6/17/2024 WBC 5.3 Hgb 12.8 hematocrit 35.1 platelets 316  Liver function: Date: 6/17/2024 ALT 6 AST 14 alkaline phos.  101 bilirubin 0.4 albumin 3.3 protein 5.6    6/17/2024 vitamin D 25-hydroxy level 33    BMP: Date: 8/1/2024 Na 142 K 4.4 Cr 0.5 BUN 29 glucose 77 Ca 9.2     BMP: Date: 8/12/2024 Na 145 K 4.3 Cr 0.6 BUN 26 glucose 50 Ca 9.7 GFR 94  CBC: Date: 8/12/2024 WBC 6.2 Hgb 13.8 hematocrit 43.5 platelets 435  Liver function: Date: 8/12/2024 ALT 7 AST 18 alkaline phos.  133 bilirubin 0.9 albumin 3.8 protein 7    BMP: Date: 8/19/2024 Na 144 K 4.3 Cr 0.6 BUN 28 glucose 94 Ca 9.4 GFR 94  CBC: Date: 8/19/2024 WBC 6.7 Hgb 12.2 hematocrit 37.9 platelets 363  Liver function: Date: 8/19/2024 ALT 9 AST 14 alkaline phos.  109 bilirubin 0.6 albumin 3.7 protein 6.3    BMP: Date: 8/26/2024 Na 146 K 3.8 Cr 0.5 BUN 24 glucose 58 Ca 9.1   Liver function: Date: 8/26/2024 ALT 6 AST 13 alkaline phos.  111 bilirubin 0.5 albumin 3.4 protein 6    8/26/2024 vitamin D 25-hydroxy level 39 wnl    CBC: Date: 9/2/2024 WBC 4.9 Hgb 12.8 hematocrit 41.8 platelets 338    BMP: Date: 9/16/2024 Na  "141 K 3.3 Cr 0.5 BUN 24 glucose 73 Ca 9.1   CBC: Date: 9/16/2024 WBC 7.2 Hgb 12.1 hematocrit 37.2 platelets 309  Liver function: Date: 9/16/2024 ALT 7 AST 14 alkaline phos.  93 bilirubin 0.5 albumin 3.4 protein 5.9    BMP: Date: 9/23/2024 Na 150 K 3.3 Cr 0.4 BUN 20 glucose 77 Ca 9.1   CBC: Date: 9/23/2024 WBC 5.6 Hgb 11.9 hematocrit 36.3 platelets 366  Liver function: Date: 9/23/2024 ALT 11 AST 13 alkaline phos.  80 bilirubin 0.5 albumin 3.1 protein 5.7    BMP: Date: 10/3/2024 Na 139 K 4.7 Cr 0.6 BUN 18 glucose 56 Ca 9.2 GFR 94  CBC: Date: 10/3/2024 WBC 4.4 Hgb 13.9 hematocrit 40 platelets 444    BMP: Date: 10/7/2024 Na 142 K 4.6 Cr 0.5 BUN 28 glucose 59 Ca 9.1   CBC: Date: 10/7/2024 WBC 5.3 Hgb 12.2 hematocrit 37.7 platelets 381  Liver function: Date: 10/7/2024 ALT 6 AST 11 alkaline phos.  90 bilirubin 0.3 albumin 3.2 protein 5.7                    BP (!) 128/96   Pulse 60   Temp 36.6 °C (97.9 °F)   Resp 16   Ht 1.575 m (5' 2\")   Wt (!) 40.1 kg (88 lb 6.4 oz)   SpO2 95%   BMI 16.17 kg/m²      Physical Exam  Vitals and nursing note reviewed.   Constitutional:       Appearance: Normal appearance.   HENT:      Head: Normocephalic.      Right Ear: External ear normal.      Left Ear: External ear normal.      Nose: Nose normal.      Mouth/Throat:      Mouth: Mucous membranes are moist.      Pharynx: Oropharynx is clear.   Eyes:      Extraocular Movements: Extraocular movements intact.      Conjunctiva/sclera: Conjunctivae normal.      Pupils: Pupils are equal, round, and reactive to light.   Cardiovascular:      Rate and Rhythm: Normal rate and regular rhythm.      Pulses: Normal pulses.      Heart sounds: Normal heart sounds.   Pulmonary:      Effort: Pulmonary effort is normal.      Breath sounds: Normal breath sounds.   Abdominal:      General: Bowel sounds are normal.      Palpations: Abdomen is soft.   Musculoskeletal:         General: Normal range of motion.      Cervical back: Normal " range of motion and neck supple.      Comments: Generalized muscle weakness; impaired gait and mobility   Skin:     General: Skin is warm and dry.   Neurological:      Mental Status: She is alert. Mental status is at baseline. She is disoriented.      Motor: Weakness present.      Coordination: Coordination abnormal.      Gait: Gait abnormal.   Psychiatric:         Mood and Affect: Affect is inappropriate.         Speech: Speech is tangential.         Cognition and Memory: Cognition is impaired. Memory is impaired.      Comments: Dementia   A x O x 1          Assessment/Plan     Follow up BMP and CBC with diff. In 2 weeks.     Hypokalemia:    Monitor potassium chloride.       DC KCL 20 meq PO every day order.         Hypernatremia:         Monitor sodium level.  Continue 240 ml po oral fluids (water) TID as ordered.  Discussed the importance of patient drinking adequately with patient and nursing.         Covid-19:  Patient tested positive for Covid-19 on 9/16/24.  Monitor for symptoms of long covid.  Monitor for cough/fevers/dehydration.      Dementia; Alzheimer's disease; Major depressive disorder; Weight loss              Continue mirtazapine and donepezil.  Monitor weights.  In house dietician to continue to monitor patient.    Continue house supplements and shakes QID.       Sequela of CVA;  Immobility; Generalized muscle weakness; left sided weakness:  Patient had a stroke in October, 2020.  Continue Plavix.  Requires full supportive care.   Very weak.  Continue to use wheelchair.    Anemia:  Continue ferrous sulfate 325 mg po every day.      Monitor H & H.    Constipation:  Continue Miralax PO every day.      Problem List Items Addressed This Visit       Dementia     Other Visit Diagnoses       Hypokalemia    -  Primary    Hypernatremia        COVID-19        Recurrent major depressive disorder, remission status unspecified (CMS-HCC)        Weight loss        Sequela of cerebrovascular accident         Generalized muscle weakness        Left-sided weakness        Immobility        Anemia, unspecified type        Constipation, unspecified constipation type                          Sandi Rudolph, APRN-CNP

## 2024-12-17 ENCOUNTER — NURSING HOME VISIT (OUTPATIENT)
Dept: POST ACUTE CARE | Facility: EXTERNAL LOCATION | Age: 89
End: 2024-12-17
Payer: COMMERCIAL

## 2024-12-17 DIAGNOSIS — M62.81 GENERALIZED MUSCLE WEAKNESS: ICD-10-CM

## 2024-12-17 DIAGNOSIS — R21 RASH: Primary | ICD-10-CM

## 2024-12-17 DIAGNOSIS — B37.2 CUTANEOUS CANDIDIASIS: ICD-10-CM

## 2024-12-17 DIAGNOSIS — Z74.09 IMMOBILITY: ICD-10-CM

## 2024-12-17 DIAGNOSIS — G30.9 SEVERE ALZHEIMER'S DEMENTIA WITH OTHER BEHAVIORAL DISTURBANCE, UNSPECIFIED TIMING OF DEMENTIA ONSET (MULTI): ICD-10-CM

## 2024-12-17 DIAGNOSIS — R63.4 WEIGHT LOSS: ICD-10-CM

## 2024-12-17 DIAGNOSIS — I69.30 SEQUELA OF CEREBROVASCULAR ACCIDENT: ICD-10-CM

## 2024-12-17 DIAGNOSIS — F33.9 RECURRENT MAJOR DEPRESSIVE DISORDER, REMISSION STATUS UNSPECIFIED (CMS-HCC): ICD-10-CM

## 2024-12-17 DIAGNOSIS — F02.C18 SEVERE ALZHEIMER'S DEMENTIA WITH OTHER BEHAVIORAL DISTURBANCE, UNSPECIFIED TIMING OF DEMENTIA ONSET (MULTI): ICD-10-CM

## 2024-12-17 DIAGNOSIS — R53.1 LEFT-SIDED WEAKNESS: ICD-10-CM

## 2024-12-17 PROCEDURE — 99308 SBSQ NF CARE LOW MDM 20: CPT | Performed by: NURSE PRACTITIONER

## 2024-12-17 NOTE — LETTER
Patient: Shonda Solano  : 3/14/1934    Encounter Date: 2024    Name: Shonda Solano YOB: 1934    Code Status:  DNR-cc    Chief Complaint:  Rash; etc....    HPI   89 year old female patient at [facility] in [facility].       Medical history includes: Difficulty in walking; muscle weakness (generalized); Acquired hammer toes on the right & left foot; Personal history of other infectious and parasitic diseases; disorientation; Alzheimer's disease; unspecified; symbolic dysfunctions; other speech and language deficits following unspecified cerebrovascular disease; mood disorder due to known physiological condition, unspecified; Bipolar disorder; Displaced intertrochanteric fracture of right femur; need for assistance with personal care; chronic kidney disease; primary osteoarthritis; unspecified ankle and foot; polyneuropathy, unspecified; unspecified abnormalities of gait and mobility; other osteoporosis without current pathological fracture.      Diagnoses as follows:    Rash; cutaneous candidiasis:  Hospice nurse reported that patient has a rash on the middle of her chest and under her breasts.  Staff was putting some hydrocortisone cream on the rash on her chest.      Patient seen today.  She is in bed.  Calm, cooperative.  Mentation at baseline.   No fevers reported.  No chest pain.  No headaches or dizziness.                 Alzheimer's disease;Major depressive disorder; Weight loss              On mirtazapine and donepezil.  Patient was recently admitted to [facility] hospice.   Also follows up with in-house psych NP at .  No agitation reported.  Weights are trending down.  Recent weights as follows:  23 105.2 #  23 103 #  24 102.4 #  24 102.4 #  3/6/24 100 #  4/3/24 103.2 #  24 97.6 #  24 96.4 #  7/3/24 97.5 #  24 94 #  24 94.6 #  10/1/24 88.4 #   Also on house supplements and shakes QID.       Sequela of CVA;  Immobility;  Generalized muscle weakness;  left sided weakness:  Patient had a stroke in .  On  Plavix.  Requires full supportive care.   Very weak.  In wheelchair.                             Reviewed  EMR  Reviewed medical, social, surgical and family history.  Reviewed all current medications and performed medication reconciliation.  Performed prescription drug management.  Reviewed vital signs AND lab results  Reviewed [facility] Documentation  Discussed patient with nursing.       ROS: Limited ROS due to mentation; ROS negative unless noted in HPI.     Surgical History  Problems    · History of Femur fracture repair   · History of Tonsillectomy     Family History  Mother    · Family history of   Father    · Family history of      Social History   · Never a smoker  No alcohol  No illicit drugs  Lives in long term care facility              Past Medical History:   Diagnosis Date   • Other malaise 2020    Physical deconditioning   • Personal history of other diseases of the digestive system     History of constipation   • Personal history of other diseases of the nervous system and sense organs 2020    History of polyneuropathy   • Personal history of urinary (tract) infections     History of urinary tract infection   • Unspecified dementia, unspecified severity, without behavioral disturbance, psychotic disturbance, mood disturbance, and anxiety (Multi) 12/10/2022    Dementia   • Unspecified diastolic (congestive) heart failure (Multi)     Diastolic heart failure   • Unspecified fracture of right femur, initial encounter for closed fracture (Multi)     Femur fracture, right             Lab Results   Component Value Date    WBC 6.8 10/02/2024    HGB 12.7 10/02/2024    HCT 40.1 10/02/2024     (H) 10/02/2024    ALT 8 2023    AST 14 2023     10/02/2024    K 4.4 10/02/2024     10/02/2024    CREATININE 0.53 10/02/2024    BUN 19 10/02/2024    CO2 28  10/02/2024    TSH 2.92 10/14/2020    INR 1.0 02/20/2022    HGBA1C 5.9 10/15/2020     Outside Labs  CBC: Date: 10/12/22, WBC: 4.8, Hgb: 11.9, Hct: 35.2, PLT: 290   CBC: Date: 7/7/22, WBC: 3.9, Hgb: 12.7, Hct: 38.5, PLT: 307   BMP: Date: 10/12/22, Na: 140, K: 4.3, Cl: 107, CO2: 25, BUN: 25, Cr: 0.6, Glu: 80, Ca: 9   BMP: Date: 7/7/22, Na: 141, K: 4.6, Cl: 105, CO2: 26, BUN: 30, Cr: 0.7, Glu: 113, Ca: 9.1   Hepatic Function Tests: Date: 3/8/22, AST: 15, ALT: 11, Alk Phos: 114, T Bili: 0.4, Albumin: 3.6   Hepatic Function Tests: Date: 3/9/21, AST: 15, ALT: 14, Alk Phos: 101, T Bili: 0.6, Albumin: 3.3   Lipid panel on 10/13/20 as follows: Cholesterol 186; triglyceride 84; HDL 51; ; VLDL 17.    Vitamin D 25 hydroxy level on 10/13/20 22 L    BMP: Date: 1/10/2023 Na 142 K 4.4 Cr 0.7 BUN 27 glucose 77 Ca 9.2 GFR 79  CBC: Date: 1/10/2023 WBC 6.9 Hgb 12.8 hematocrit 38.4 platelets 398  Liver function: Date: 1/10/2023 ALT 11 AST 18 alkaline phos.  112 bilirubin 0.5 albumin 3.7 protein 6.4    BMP: Date: 4/18/2023 Na 145 K 4 Cr 0.7 BUN 25 glucose 85 Ca 8.8 GFR 79  CBC: Date: 4/18/2023 WBC 5.2 Hgb 12.6 hematocrit 38.3 platelets 298  Liver function: Date: 4/18/2023 ALT 6 AST 11 alkaline phos.  94 bilirubin 0.5 albumin 3.4 protein 5.7   4/18/23 vitamin D 25 hydroxy level 19.62 L    BMP: Date: 8/8/2023 Na 142 K 4.9 Cr 0.6 BUN 24 glucose 65 Ca 9.2 GFR 94  CBC: Date: 8/8/2023 WBC 5.4 Hgb 12.8 hematocrit 40.4 platelets 398  Liver function: Date: 8/8/2023 ALT 8 AST 18 alkaline phos.  108 bilirubin 0.6 albumin 3.7 protein 6.4    8/8/23 vitamin D hydroxy level 29.92 L    BMP: Date: 10/6/2023 Na 142 K 4.5 Cr 0.7 BUN 28 glucose 86 Ca 9.8 GFR 79  CBC: Date: 10/6/2023 WBC 6.2 Hgb 12.7 hematocrit 39.3 platelets 358    10/6/2023: TSH 2.652 within normal limits (range 0.340-5.500)    10/6/2023: Vitamin D 25-hydroxy level 32.58 within normal limits (range )    11/2/2023: Vitamin D 25-hydroxy level 33.84    BMP: Date: 12/4/2023 Na 143  K 4.3 Cr 0.6 BUN 23 glucose 76 Ca 9.8 GFR 94  Liver function: date: 12/4/2023 ALT 10 AST 17 alkaline phos.  112 bilirubin 0.7 albumin 3.8 protein 6.5    12/4/2023 vitamin D 25-hydroxy level 56 wnl    BMP: Date: 12/11/2023 Na 140 K 4.9 Cr 0.6 BUN 21 glucose 55 Ca 9.1 GFR 94  CBC: Date: 12/13/2023 WBC 5.9 Hgb 12.6 hematocrit 39.32 platelets 364  Liver function: Date: 12/11/2023 ALT 8 AST 15 alkaline phos.  97 bilirubin 0.5 albumin 3.3 protein 5.6    12/11/2023 vitamin D hydroxy level 43 wnl    BMP: Date: 1/29/2024 Na 142 K 4.1 Cr 0.7 BUN 23 glucose 81 Ca 9.7 GFR 79  CBC: Date: 1/29/2024 WBC 6.7 Hgb 13.1 hematocrit 40.6 platelets 368  Liver function: Date: 1/29/2024 ALT 14 AST 21 alkaline phos.  131 bilirubin 0.8 albumin 4 protein 6.7    BMP: Date: 3/11/2024 Na 140 K 4.1 Cr 0.6 BUN 23 glucose 129 Ca 9.4 GFR 94  CBC: Date: 3/11/2024 WBC 6.3 Hgb 12.7 hematocrit 39 platelets 381  Liver function: Date: 3/11/2024 ALT 9 AST 14 alkaline phos.  119 bilirubin 0.5 albumin 3.5 protein 6.1    3/11/2024 vitamin D 25-hydroxy level: 35 ( )    BMP: Date: 4/8/2024 Na 143 K 4.1 Cr 0.7 BUN 28 glucose 110 Ca 10.2 GFR 95  Liver function: Date: 4/8/2024 ALT 11 AST 22 alkaline phos.  134 bilirubin 1 albumin 4.1 protein 7.2    BMP: Date: 4/18/2024 Na 141 K 4.2 Cr 0.5 BUN 22 glucose 78 Ca 9.6   CBC: Date: 4/18/2024 WBC 7.8 Hgb 13.9 hematocrit 42.9 platelets 444  Liver function: Date: 4/18/2024 ALT 9 AST 15 alkaline phos.  111 bilirubin 0.5 albumin 3.6 protein 6.1    4/18/2024 vitamin D 25-hydroxy level 35 (range )    BMP: Date: 4/22/2024 Na 144 K 4.3 Cr 0.5 BUN 18 glucose 97 Ca 9   CBC: Date: 4/22/2024 WBC 6.5 Hgb 13.4 hematocrit 40.7 platelets 442  Liver function: Date: 4/22/2024 ALT 7 AST 13 alkaline phos.  98 bilirubin 0.6 albumin 3.3 protein 6    4/22/24 vitamin D 25 hydroxy level 34    BMP: Date: 6/17/2024 Na 143 K 4.3 Cr 0.5 BUN 20 glucose 61 Ca 9.1   CBC: Date: 6/17/2024 WBC 5.3 Hgb 12.8 hematocrit 35.1  platelets 316  Liver function: Date: 6/17/2024 ALT 6 AST 14 alkaline phos.  101 bilirubin 0.4 albumin 3.3 protein 5.6    6/17/2024 vitamin D 25-hydroxy level 33    BMP: Date: 8/1/2024 Na 142 K 4.4 Cr 0.5 BUN 29 glucose 77 Ca 9.2     BMP: Date: 8/12/2024 Na 145 K 4.3 Cr 0.6 BUN 26 glucose 50 Ca 9.7 GFR 94  CBC: Date: 8/12/2024 WBC 6.2 Hgb 13.8 hematocrit 43.5 platelets 435  Liver function: Date: 8/12/2024 ALT 7 AST 18 alkaline phos.  133 bilirubin 0.9 albumin 3.8 protein 7    BMP: Date: 8/19/2024 Na 144 K 4.3 Cr 0.6 BUN 28 glucose 94 Ca 9.4 GFR 94  CBC: Date: 8/19/2024 WBC 6.7 Hgb 12.2 hematocrit 37.9 platelets 363  Liver function: Date: 8/19/2024 ALT 9 AST 14 alkaline phos.  109 bilirubin 0.6 albumin 3.7 protein 6.3    BMP: Date: 8/26/2024 Na 146 K 3.8 Cr 0.5 BUN 24 glucose 58 Ca 9.1   Liver function: Date: 8/26/2024 ALT 6 AST 13 alkaline phos.  111 bilirubin 0.5 albumin 3.4 protein 6    8/26/2024 vitamin D 25-hydroxy level 39 wnl    CBC: Date: 9/2/2024 WBC 4.9 Hgb 12.8 hematocrit 41.8 platelets 338    BMP: Date: 9/16/2024 Na 141 K 3.3 Cr 0.5 BUN 24 glucose 73 Ca 9.1   CBC: Date: 9/16/2024 WBC 7.2 Hgb 12.1 hematocrit 37.2 platelets 309  Liver function: Date: 9/16/2024 ALT 7 AST 14 alkaline phos.  93 bilirubin 0.5 albumin 3.4 protein 5.9    BMP: Date: 9/23/2024 Na 150 K 3.3 Cr 0.4 BUN 20 glucose 77 Ca 9.1   CBC: Date: 9/23/2024 WBC 5.6 Hgb 11.9 hematocrit 36.3 platelets 366  Liver function: Date: 9/23/2024 ALT 11 AST 13 alkaline phos.  80 bilirubin 0.5 albumin 3.1 protein 5.7    BMP: Date: 10/3/2024 Na 139 K 4.7 Cr 0.6 BUN 18 glucose 56 Ca 9.2 GFR 94  CBC: Date: 10/3/2024 WBC 4.4 Hgb 13.9 hematocrit 40 platelets 444    BMP: Date: 10/7/2024 Na 142 K 4.6 Cr 0.5 BUN 28 glucose 59 Ca 9.1   CBC: Date: 10/7/2024 WBC 5.3 Hgb 12.2 hematocrit 37.7 platelets 381  Liver function: Date: 10/7/2024 ALT 6 AST 11 alkaline phos.  90 bilirubin 0.3 albumin 3.2 protein 5.7                    BP (!)  "128/96   Pulse 60   Temp 36.6 °C (97.9 °F)   Resp 16   Ht 1.575 m (5' 2\")   Wt (!) 40.1 kg (88 lb 6.5 oz)   SpO2 95%   BMI 16.17 kg/m²      Physical Exam  Vitals and nursing note reviewed. Exam conducted with a chaperone present.   Constitutional:       Appearance: Normal appearance.   HENT:      Head: Normocephalic.      Right Ear: External ear normal.      Left Ear: External ear normal.      Nose: Nose normal.      Mouth/Throat:      Mouth: Mucous membranes are moist.      Pharynx: Oropharynx is clear.   Eyes:      Extraocular Movements: Extraocular movements intact.      Conjunctiva/sclera: Conjunctivae normal.      Pupils: Pupils are equal, round, and reactive to light.   Cardiovascular:      Rate and Rhythm: Normal rate and regular rhythm.      Pulses: Normal pulses.      Heart sounds: Normal heart sounds.   Pulmonary:      Effort: Pulmonary effort is normal.      Breath sounds: Normal breath sounds.   Abdominal:      General: Bowel sounds are normal.      Palpations: Abdomen is soft.   Musculoskeletal:         General: Normal range of motion.      Cervical back: Normal range of motion and neck supple.      Comments: Generalized muscle weakness; impaired gait and mobility   Skin:     General: Skin is warm and dry.      Comments: Unspecified rash middle of chest;  Cutaneous candidiasis under bilateral breasts.   Neurological:      Mental Status: She is alert. Mental status is at baseline. She is disoriented.      Motor: Weakness present.      Coordination: Coordination abnormal.      Gait: Gait abnormal.   Psychiatric:         Mood and Affect: Affect is inappropriate.         Speech: Speech is tangential.         Cognition and Memory: Cognition is impaired. Memory is impaired.      Comments: Dementia   A x O x 1          Assessment/Plan      Rash; cutaneous candidiasis:  Start triamcinolone cream BID to rash on middle of chest routinely until resolved.  Start nystatin cream BID to rash (cutaneous " candidiasis under the breasts) routinely until resolved.  Monitor for improvement.              Alzheimer's disease;Major depressive disorder; Weight loss              Continue mirtazapine and donepezil.  Patient was recently admitted to [facility] hosp[facility].   Monitor weights.   Continue house supplements and shakes QID.       Sequela of CVA;  Immobility; Generalized muscle weakness;  left sided weakness:  Patient had a stroke in October, 2020.  Continue Plavix.  Requires full supportive care.       Continue to use wheelchair.       Fall prevention.            Problem List Items Addressed This Visit       Dementia     Other Visit Diagnoses       Rash    -  Primary    Cutaneous candidiasis        Recurrent major depressive disorder, remission status unspecified (CMS-HCC)        Weight loss        Sequela of cerebrovascular accident        Generalized muscle weakness        Left-sided weakness        Immobility                    YIMI Joradn       Electronically Signed By: YIMI Jordan   12/22/24 10:32 PM

## 2024-12-22 VITALS
DIASTOLIC BLOOD PRESSURE: 96 MMHG | TEMPERATURE: 97.9 F | SYSTOLIC BLOOD PRESSURE: 128 MMHG | BODY MASS INDEX: 16.27 KG/M2 | HEIGHT: 62 IN | RESPIRATION RATE: 16 BRPM | HEART RATE: 60 BPM | OXYGEN SATURATION: 95 % | WEIGHT: 88.4 LBS

## 2024-12-23 NOTE — PROGRESS NOTES
Name: Shonda Solano    : 3/14/1934    Code Status:  DNR-cc    Chief Complaint:  Rash; etc....    HPI   89 year old female patient at [facility] in [facility].       Medical history includes: Difficulty in walking; muscle weakness (generalized); Acquired hammer toes on the right & left foot; Personal history of other infectious and parasitic diseases; disorientation; Alzheimer's disease; unspecified; symbolic dysfunctions; other speech and language deficits following unspecified cerebrovascular disease; mood disorder due to known physiological condition, unspecified; Bipolar disorder; Displaced intertrochanteric fracture of right femur; need for assistance with personal care; chronic kidney disease; primary osteoarthritis; unspecified ankle and foot; polyneuropathy, unspecified; unspecified abnormalities of gait and mobility; other osteoporosis without current pathological fracture.      Diagnoses as follows:    Rash; cutaneous candidiasis:  Hospice nurse reported that patient has a rash on the middle of her chest and under her breasts.  Staff was putting some hydrocortisone cream on the rash on her chest.      Patient seen today.  She is in bed.  Calm, cooperative.  Mentation at baseline.   No fevers reported.  No chest pain.  No headaches or dizziness.                 Alzheimer's disease;Major depressive disorder; Weight loss              On mirtazapine and donepezil.  Patient was recently admitted to [facility] beds at the [facility] hospice.   Also follows up with in-house psych NP at .  No agitation reported.  Weights are trending down.  Recent weights as follows:  23 105.2 #  23 103 #  24 102.4 #  24 102.4 #  3/6/24 100 #  4/3/24 103.2 #  24 97.6 #  24 96.4 #  7/3/24 97.5 #  24 94 #  24 94.6 #  10/1/24 88.4 #   Also on house supplements and shakes QID.       Sequela of CVA;  Immobility; Generalized muscle weakness;  left sided weakness:  Patient had a stroke in  .  On  Plavix.  Requires full supportive care.   Very weak.  In wheelchair.                             Reviewed  EMR  Reviewed medical, social, surgical and family history.  Reviewed all current medications and performed medication reconciliation.  Performed prescription drug management.  Reviewed vital signs AND lab results  Reviewed [facility] Care Documentation  Discussed patient with nursing.       ROS: Limited ROS due to mentation; ROS negative unless noted in HPI.     Surgical History  Problems    · History of Femur fracture repair   · History of Tonsillectomy     Family History  Mother    · Family history of   Father    · Family history of      Social History   · Never a smoker  No alcohol  No illicit drugs  Lives in long term care facility              Past Medical History:   Diagnosis Date    Other malaise 2020    Physical deconditioning    Personal history of other diseases of the digestive system     History of constipation    Personal history of other diseases of the nervous system and sense organs 2020    History of polyneuropathy    Personal history of urinary (tract) infections     History of urinary tract infection    Unspecified dementia, unspecified severity, without behavioral disturbance, psychotic disturbance, mood disturbance, and anxiety (Multi) 12/10/2022    Dementia    Unspecified diastolic (congestive) heart failure (Multi)     Diastolic heart failure    Unspecified fracture of right femur, initial encounter for closed fracture (Multi)     Femur fracture, right             Lab Results   Component Value Date    WBC 6.8 10/02/2024    HGB 12.7 10/02/2024    HCT 40.1 10/02/2024     (H) 10/02/2024    ALT 8 2023    AST 14 2023     10/02/2024    K 4.4 10/02/2024     10/02/2024    CREATININE 0.53 10/02/2024    BUN 19 10/02/2024    CO2 28 10/02/2024    TSH 2.92 10/14/2020    INR 1.0 2022    HGBA1C 5.9 10/15/2020      Outside Labs  CBC: Date: 10/12/22, WBC: 4.8, Hgb: 11.9, Hct: 35.2, PLT: 290   CBC: Date: 7/7/22, WBC: 3.9, Hgb: 12.7, Hct: 38.5, PLT: 307   BMP: Date: 10/12/22, Na: 140, K: 4.3, Cl: 107, CO2: 25, BUN: 25, Cr: 0.6, Glu: 80, Ca: 9   BMP: Date: 7/7/22, Na: 141, K: 4.6, Cl: 105, CO2: 26, BUN: 30, Cr: 0.7, Glu: 113, Ca: 9.1   Hepatic Function Tests: Date: 3/8/22, AST: 15, ALT: 11, Alk Phos: 114, T Bili: 0.4, Albumin: 3.6   Hepatic Function Tests: Date: 3/9/21, AST: 15, ALT: 14, Alk Phos: 101, T Bili: 0.6, Albumin: 3.3   Lipid panel on 10/13/20 as follows: Cholesterol 186; triglyceride 84; HDL 51; ; VLDL 17.    Vitamin D 25 hydroxy level on 10/13/20 22 L    BMP: Date: 1/10/2023 Na 142 K 4.4 Cr 0.7 BUN 27 glucose 77 Ca 9.2 GFR 79  CBC: Date: 1/10/2023 WBC 6.9 Hgb 12.8 hematocrit 38.4 platelets 398  Liver function: Date: 1/10/2023 ALT 11 AST 18 alkaline phos.  112 bilirubin 0.5 albumin 3.7 protein 6.4    BMP: Date: 4/18/2023 Na 145 K 4 Cr 0.7 BUN 25 glucose 85 Ca 8.8 GFR 79  CBC: Date: 4/18/2023 WBC 5.2 Hgb 12.6 hematocrit 38.3 platelets 298  Liver function: Date: 4/18/2023 ALT 6 AST 11 alkaline phos.  94 bilirubin 0.5 albumin 3.4 protein 5.7   4/18/23 vitamin D 25 hydroxy level 19.62 L    BMP: Date: 8/8/2023 Na 142 K 4.9 Cr 0.6 BUN 24 glucose 65 Ca 9.2 GFR 94  CBC: Date: 8/8/2023 WBC 5.4 Hgb 12.8 hematocrit 40.4 platelets 398  Liver function: Date: 8/8/2023 ALT 8 AST 18 alkaline phos.  108 bilirubin 0.6 albumin 3.7 protein 6.4    8/8/23 vitamin D hydroxy level 29.92 L    BMP: Date: 10/6/2023 Na 142 K 4.5 Cr 0.7 BUN 28 glucose 86 Ca 9.8 GFR 79  CBC: Date: 10/6/2023 WBC 6.2 Hgb 12.7 hematocrit 39.3 platelets 358    10/6/2023: TSH 2.652 within normal limits (range 0.340-5.500)    10/6/2023: Vitamin D 25-hydroxy level 32.58 within normal limits (range )    11/2/2023: Vitamin D 25-hydroxy level 33.84    BMP: Date: 12/4/2023 Na 143 K 4.3 Cr 0.6 BUN 23 glucose 76 Ca 9.8 GFR 94  Liver function: date: 12/4/2023 ALT  10 AST 17 alkaline phos.  112 bilirubin 0.7 albumin 3.8 protein 6.5    12/4/2023 vitamin D 25-hydroxy level 56 wnl    BMP: Date: 12/11/2023 Na 140 K 4.9 Cr 0.6 BUN 21 glucose 55 Ca 9.1 GFR 94  CBC: Date: 12/13/2023 WBC 5.9 Hgb 12.6 hematocrit 39.32 platelets 364  Liver function: Date: 12/11/2023 ALT 8 AST 15 alkaline phos.  97 bilirubin 0.5 albumin 3.3 protein 5.6    12/11/2023 vitamin D hydroxy level 43 wnl    BMP: Date: 1/29/2024 Na 142 K 4.1 Cr 0.7 BUN 23 glucose 81 Ca 9.7 GFR 79  CBC: Date: 1/29/2024 WBC 6.7 Hgb 13.1 hematocrit 40.6 platelets 368  Liver function: Date: 1/29/2024 ALT 14 AST 21 alkaline phos.  131 bilirubin 0.8 albumin 4 protein 6.7    BMP: Date: 3/11/2024 Na 140 K 4.1 Cr 0.6 BUN 23 glucose 129 Ca 9.4 GFR 94  CBC: Date: 3/11/2024 WBC 6.3 Hgb 12.7 hematocrit 39 platelets 381  Liver function: Date: 3/11/2024 ALT 9 AST 14 alkaline phos.  119 bilirubin 0.5 albumin 3.5 protein 6.1    3/11/2024 vitamin D 25-hydroxy level: 35 ( )    BMP: Date: 4/8/2024 Na 143 K 4.1 Cr 0.7 BUN 28 glucose 110 Ca 10.2 GFR 95  Liver function: Date: 4/8/2024 ALT 11 AST 22 alkaline phos.  134 bilirubin 1 albumin 4.1 protein 7.2    BMP: Date: 4/18/2024 Na 141 K 4.2 Cr 0.5 BUN 22 glucose 78 Ca 9.6   CBC: Date: 4/18/2024 WBC 7.8 Hgb 13.9 hematocrit 42.9 platelets 444  Liver function: Date: 4/18/2024 ALT 9 AST 15 alkaline phos.  111 bilirubin 0.5 albumin 3.6 protein 6.1    4/18/2024 vitamin D 25-hydroxy level 35 (range )    BMP: Date: 4/22/2024 Na 144 K 4.3 Cr 0.5 BUN 18 glucose 97 Ca 9   CBC: Date: 4/22/2024 WBC 6.5 Hgb 13.4 hematocrit 40.7 platelets 442  Liver function: Date: 4/22/2024 ALT 7 AST 13 alkaline phos.  98 bilirubin 0.6 albumin 3.3 protein 6    4/22/24 vitamin D 25 hydroxy level 34    BMP: Date: 6/17/2024 Na 143 K 4.3 Cr 0.5 BUN 20 glucose 61 Ca 9.1   CBC: Date: 6/17/2024 WBC 5.3 Hgb 12.8 hematocrit 35.1 platelets 316  Liver function: Date: 6/17/2024 ALT 6 AST 14 alkaline phos.  101  "bilirubin 0.4 albumin 3.3 protein 5.6    6/17/2024 vitamin D 25-hydroxy level 33    BMP: Date: 8/1/2024 Na 142 K 4.4 Cr 0.5 BUN 29 glucose 77 Ca 9.2     BMP: Date: 8/12/2024 Na 145 K 4.3 Cr 0.6 BUN 26 glucose 50 Ca 9.7 GFR 94  CBC: Date: 8/12/2024 WBC 6.2 Hgb 13.8 hematocrit 43.5 platelets 435  Liver function: Date: 8/12/2024 ALT 7 AST 18 alkaline phos.  133 bilirubin 0.9 albumin 3.8 protein 7    BMP: Date: 8/19/2024 Na 144 K 4.3 Cr 0.6 BUN 28 glucose 94 Ca 9.4 GFR 94  CBC: Date: 8/19/2024 WBC 6.7 Hgb 12.2 hematocrit 37.9 platelets 363  Liver function: Date: 8/19/2024 ALT 9 AST 14 alkaline phos.  109 bilirubin 0.6 albumin 3.7 protein 6.3    BMP: Date: 8/26/2024 Na 146 K 3.8 Cr 0.5 BUN 24 glucose 58 Ca 9.1   Liver function: Date: 8/26/2024 ALT 6 AST 13 alkaline phos.  111 bilirubin 0.5 albumin 3.4 protein 6    8/26/2024 vitamin D 25-hydroxy level 39 wnl    CBC: Date: 9/2/2024 WBC 4.9 Hgb 12.8 hematocrit 41.8 platelets 338    BMP: Date: 9/16/2024 Na 141 K 3.3 Cr 0.5 BUN 24 glucose 73 Ca 9.1   CBC: Date: 9/16/2024 WBC 7.2 Hgb 12.1 hematocrit 37.2 platelets 309  Liver function: Date: 9/16/2024 ALT 7 AST 14 alkaline phos.  93 bilirubin 0.5 albumin 3.4 protein 5.9    BMP: Date: 9/23/2024 Na 150 K 3.3 Cr 0.4 BUN 20 glucose 77 Ca 9.1   CBC: Date: 9/23/2024 WBC 5.6 Hgb 11.9 hematocrit 36.3 platelets 366  Liver function: Date: 9/23/2024 ALT 11 AST 13 alkaline phos.  80 bilirubin 0.5 albumin 3.1 protein 5.7    BMP: Date: 10/3/2024 Na 139 K 4.7 Cr 0.6 BUN 18 glucose 56 Ca 9.2 GFR 94  CBC: Date: 10/3/2024 WBC 4.4 Hgb 13.9 hematocrit 40 platelets 444    BMP: Date: 10/7/2024 Na 142 K 4.6 Cr 0.5 BUN 28 glucose 59 Ca 9.1   CBC: Date: 10/7/2024 WBC 5.3 Hgb 12.2 hematocrit 37.7 platelets 381  Liver function: Date: 10/7/2024 ALT 6 AST 11 alkaline phos.  90 bilirubin 0.3 albumin 3.2 protein 5.7                    BP (!) 128/96   Pulse 60   Temp 36.6 °C (97.9 °F)   Resp 16   Ht 1.575 m (5' 2\")   Wt " (!) 40.1 kg (88 lb 6.5 oz)   SpO2 95%   BMI 16.17 kg/m²      Physical Exam  Vitals and nursing note reviewed. Exam conducted with a chaperone present.   Constitutional:       Appearance: Normal appearance.   HENT:      Head: Normocephalic.      Right Ear: External ear normal.      Left Ear: External ear normal.      Nose: Nose normal.      Mouth/Throat:      Mouth: Mucous membranes are moist.      Pharynx: Oropharynx is clear.   Eyes:      Extraocular Movements: Extraocular movements intact.      Conjunctiva/sclera: Conjunctivae normal.      Pupils: Pupils are equal, round, and reactive to light.   Cardiovascular:      Rate and Rhythm: Normal rate and regular rhythm.      Pulses: Normal pulses.      Heart sounds: Normal heart sounds.   Pulmonary:      Effort: Pulmonary effort is normal.      Breath sounds: Normal breath sounds.   Abdominal:      General: Bowel sounds are normal.      Palpations: Abdomen is soft.   Musculoskeletal:         General: Normal range of motion.      Cervical back: Normal range of motion and neck supple.      Comments: Generalized muscle weakness; impaired gait and mobility   Skin:     General: Skin is warm and dry.      Comments: Unspecified rash middle of chest;  Cutaneous candidiasis under bilateral breasts.   Neurological:      Mental Status: She is alert. Mental status is at baseline. She is disoriented.      Motor: Weakness present.      Coordination: Coordination abnormal.      Gait: Gait abnormal.   Psychiatric:         Mood and Affect: Affect is inappropriate.         Speech: Speech is tangential.         Cognition and Memory: Cognition is impaired. Memory is impaired.      Comments: Dementia   A x O x 1          Assessment/Plan       Rash; cutaneous candidiasis:  Start triamcinolone cream BID to rash on middle of chest routinely until resolved.  Start nystatin cream BID to rash (cutaneous candidiasis under the breasts) routinely until resolved.  Monitor for improvement.               Alzheimer's disease;Major depressive disorder; Weight loss              Continue mirtazapine and donepezil.  Patient was recently admitted to [facility] hospice.   Monitor weights.   Continue house supplements and shakes QID.       Sequela of CVA;  Immobility; Generalized muscle weakness;  left sided weakness:  Patient had a stroke in October, 2020.  Continue Plavix.  Requires full supportive care.       Continue to use wheelchair.       Fall prevention.            Problem List Items Addressed This Visit       Dementia     Other Visit Diagnoses       Rash    -  Primary    Cutaneous candidiasis        Recurrent major depressive disorder, remission status unspecified (CMS-HCC)        Weight loss        Sequela of cerebrovascular accident        Generalized muscle weakness        Left-sided weakness        Immobility                    Sandi Rudolph, APRN-CNP

## 2025-02-04 ENCOUNTER — NURSING HOME VISIT (OUTPATIENT)
Dept: POST ACUTE CARE | Facility: EXTERNAL LOCATION | Age: OVER 89
End: 2025-02-04
Payer: COMMERCIAL

## 2025-02-04 DIAGNOSIS — R21 RASH: ICD-10-CM

## 2025-02-04 DIAGNOSIS — F02.C18 SEVERE ALZHEIMER'S DEMENTIA WITH OTHER BEHAVIORAL DISTURBANCE, UNSPECIFIED TIMING OF DEMENTIA ONSET (MULTI): ICD-10-CM

## 2025-02-04 DIAGNOSIS — L85.3 XEROSIS OF SKIN: ICD-10-CM

## 2025-02-04 DIAGNOSIS — G30.9 SEVERE ALZHEIMER'S DEMENTIA WITH OTHER BEHAVIORAL DISTURBANCE, UNSPECIFIED TIMING OF DEMENTIA ONSET (MULTI): ICD-10-CM

## 2025-02-04 DIAGNOSIS — L30.9 DERMATITIS: ICD-10-CM

## 2025-02-04 DIAGNOSIS — M62.81 GENERALIZED MUSCLE WEAKNESS: ICD-10-CM

## 2025-02-04 DIAGNOSIS — I69.30 SEQUELA OF CEREBROVASCULAR ACCIDENT: ICD-10-CM

## 2025-02-04 DIAGNOSIS — R53.1 LEFT-SIDED WEAKNESS: ICD-10-CM

## 2025-02-04 DIAGNOSIS — R63.4 WEIGHT LOSS: Primary | ICD-10-CM

## 2025-02-04 DIAGNOSIS — F33.9 RECURRENT MAJOR DEPRESSIVE DISORDER, REMISSION STATUS UNSPECIFIED (CMS-HCC): ICD-10-CM

## 2025-02-04 DIAGNOSIS — Z74.09 IMMOBILITY: ICD-10-CM

## 2025-02-04 PROCEDURE — 99308 SBSQ NF CARE LOW MDM 20: CPT | Performed by: NURSE PRACTITIONER

## 2025-02-04 NOTE — LETTER
Patient: Shonda Solano  : 3/14/1934    Encounter Date: 2025    Name: Shonda Solano YOB: 1934    [facility]    Chief Complaint:  Follow up on weight loss;  etc....    HPI   89 year old female patient at [facility] in Long Term Care.       Medical history includes: Difficulty in walking; muscle weakness (generalized); Acquired hammer toes on the right & left foot; Personal history of other infectious and parasitic diseases; disorientation; Alzheimer's disease; unspecified; symbolic dysfunctions; other speech and language deficits following unspecified cerebrovascular disease; mood disorder due to known physiological condition, unspecified; Bipolar disorder; Displaced intertrochanteric fracture of right femur; need for assistance with personal care; chronic kidney disease; primary osteoarthritis; unspecified ankle and foot; polyneuropathy, unspecified; unspecified abnormalities of gait and mobility; other osteoporosis without current pathological fracture.      Diagnoses as follows:     Weight loss; Alzheimer's disease;Major depressive disorder:              On mirtazapine and donepezil.  Patient is a [facility] hospice patient.   Also follows up with in-house psych NP at .  No agitation reported.  Weights are trending down.  Recent weights as follows:  23 105.2 #  23 103 #  24 102.4 #  24 102.4 #  3/6/24 100 #  4/3/24 103.2 #  24 97.6 #  24 96.4 #  7/3/24 97.5 #  24 94 #  24 94.6 #  10/1/24 88.4 #  24 80 #  24 86. 8 #  24 81.4 #  24 82.8 #  Weights trending down, slightly increased increase from last month.    Also on house supplements and shakes QID.  Patient seen today.  She is in bed.  Calm, cooperative.  Mentation at baseline.   No fevers reported.  No chest pain.  No headaches or dizziness.             Rash; dermatitis; xerosis:        Has some dry erythematous areas on her back.         Reminded patient not  to scratch area.               Sequela of CVA;  Immobility; Generalized muscle weakness;  left sided weakness:  Patient had a stroke in .  On  Plavix.  Requires full supportive care.   Very weak.  In wheelchair.                             Reviewed  EMR  Reviewed medical, social, surgical and family history.  Reviewed all current medications and performed medication reconciliation.  Performed prescription drug management.  Reviewed vital signs AND lab results  Reviewed [facility] Care Documentation  Discussed patient with nursing.       ROS: Limited ROS due to mentation; ROS negative unless noted in HPI.     Surgical History  Problems    · History of Femur fracture repair   · History of Tonsillectomy     Family History  Mother    · Family history of   Father    · Family history of      Social History   · Never a smoker  No alcohol  No illicit drugs  Lives in long term care facility              Past Medical History:   Diagnosis Date   • Other malaise 2020    Physical deconditioning   • Personal history of other diseases of the digestive system     History of constipation   • Personal history of other diseases of the nervous system and sense organs 2020    History of polyneuropathy   • Personal history of urinary (tract) infections     History of urinary tract infection   • Unspecified dementia, unspecified severity, without behavioral disturbance, psychotic disturbance, mood disturbance, and anxiety (Multi) 12/10/2022    Dementia   • Unspecified diastolic (congestive) heart failure (Multi)     Diastolic heart failure   • Unspecified fracture of right femur, initial encounter for closed fracture (Multi)     Femur fracture, right             Lab Results   Component Value Date    WBC 6.8 10/02/2024    HGB 12.7 10/02/2024    HCT 40.1 10/02/2024     (H) 10/02/2024    ALT 8 2023    AST 14 2023     10/02/2024    K 4.4 10/02/2024     10/02/2024     CREATININE 0.53 10/02/2024    BUN 19 10/02/2024    CO2 28 10/02/2024    TSH 2.92 10/14/2020    INR 1.0 02/20/2022    HGBA1C 5.9 10/15/2020     Outside Labs  CBC: Date: 10/12/22, WBC: 4.8, Hgb: 11.9, Hct: 35.2, PLT: 290   CBC: Date: 7/7/22, WBC: 3.9, Hgb: 12.7, Hct: 38.5, PLT: 307   BMP: Date: 10/12/22, Na: 140, K: 4.3, Cl: 107, CO2: 25, BUN: 25, Cr: 0.6, Glu: 80, Ca: 9   BMP: Date: 7/7/22, Na: 141, K: 4.6, Cl: 105, CO2: 26, BUN: 30, Cr: 0.7, Glu: 113, Ca: 9.1   Hepatic Function Tests: Date: 3/8/22, AST: 15, ALT: 11, Alk Phos: 114, T Bili: 0.4, Albumin: 3.6   Hepatic Function Tests: Date: 3/9/21, AST: 15, ALT: 14, Alk Phos: 101, T Bili: 0.6, Albumin: 3.3   Lipid panel on 10/13/20 as follows: Cholesterol 186; triglyceride 84; HDL 51; ; VLDL 17.    Vitamin D 25 hydroxy level on 10/13/20 22 L    BMP: Date: 1/10/2023 Na 142 K 4.4 Cr 0.7 BUN 27 glucose 77 Ca 9.2 GFR 79  CBC: Date: 1/10/2023 WBC 6.9 Hgb 12.8 hematocrit 38.4 platelets 398  Liver function: Date: 1/10/2023 ALT 11 AST 18 alkaline phos.  112 bilirubin 0.5 albumin 3.7 protein 6.4    BMP: Date: 4/18/2023 Na 145 K 4 Cr 0.7 BUN 25 glucose 85 Ca 8.8 GFR 79  CBC: Date: 4/18/2023 WBC 5.2 Hgb 12.6 hematocrit 38.3 platelets 298  Liver function: Date: 4/18/2023 ALT 6 AST 11 alkaline phos.  94 bilirubin 0.5 albumin 3.4 protein 5.7   4/18/23 vitamin D 25 hydroxy level 19.62 L    BMP: Date: 8/8/2023 Na 142 K 4.9 Cr 0.6 BUN 24 glucose 65 Ca 9.2 GFR 94  CBC: Date: 8/8/2023 WBC 5.4 Hgb 12.8 hematocrit 40.4 platelets 398  Liver function: Date: 8/8/2023 ALT 8 AST 18 alkaline phos.  108 bilirubin 0.6 albumin 3.7 protein 6.4    8/8/23 vitamin D hydroxy level 29.92 L    BMP: Date: 10/6/2023 Na 142 K 4.5 Cr 0.7 BUN 28 glucose 86 Ca 9.8 GFR 79  CBC: Date: 10/6/2023 WBC 6.2 Hgb 12.7 hematocrit 39.3 platelets 358    10/6/2023: TSH 2.652 within normal limits (range 0.340-5.500)    10/6/2023: Vitamin D 25-hydroxy level 32.58 within normal limits (range )    11/2/2023:  Vitamin D 25-hydroxy level 33.84    BMP: Date: 12/4/2023 Na 143 K 4.3 Cr 0.6 BUN 23 glucose 76 Ca 9.8 GFR 94  Liver function: date: 12/4/2023 ALT 10 AST 17 alkaline phos.  112 bilirubin 0.7 albumin 3.8 protein 6.5    12/4/2023 vitamin D 25-hydroxy level 56 wnl    BMP: Date: 12/11/2023 Na 140 K 4.9 Cr 0.6 BUN 21 glucose 55 Ca 9.1 GFR 94  CBC: Date: 12/13/2023 WBC 5.9 Hgb 12.6 hematocrit 39.32 platelets 364  Liver function: Date: 12/11/2023 ALT 8 AST 15 alkaline phos.  97 bilirubin 0.5 albumin 3.3 protein 5.6    12/11/2023 vitamin D hydroxy level 43 wnl    BMP: Date: 1/29/2024 Na 142 K 4.1 Cr 0.7 BUN 23 glucose 81 Ca 9.7 GFR 79  CBC: Date: 1/29/2024 WBC 6.7 Hgb 13.1 hematocrit 40.6 platelets 368  Liver function: Date: 1/29/2024 ALT 14 AST 21 alkaline phos.  131 bilirubin 0.8 albumin 4 protein 6.7    BMP: Date: 3/11/2024 Na 140 K 4.1 Cr 0.6 BUN 23 glucose 129 Ca 9.4 GFR 94  CBC: Date: 3/11/2024 WBC 6.3 Hgb 12.7 hematocrit 39 platelets 381  Liver function: Date: 3/11/2024 ALT 9 AST 14 alkaline phos.  119 bilirubin 0.5 albumin 3.5 protein 6.1    3/11/2024 vitamin D 25-hydroxy level: 35 ( )    BMP: Date: 4/8/2024 Na 143 K 4.1 Cr 0.7 BUN 28 glucose 110 Ca 10.2 GFR 95  Liver function: Date: 4/8/2024 ALT 11 AST 22 alkaline phos.  134 bilirubin 1 albumin 4.1 protein 7.2    BMP: Date: 4/18/2024 Na 141 K 4.2 Cr 0.5 BUN 22 glucose 78 Ca 9.6   CBC: Date: 4/18/2024 WBC 7.8 Hgb 13.9 hematocrit 42.9 platelets 444  Liver function: Date: 4/18/2024 ALT 9 AST 15 alkaline phos.  111 bilirubin 0.5 albumin 3.6 protein 6.1    4/18/2024 vitamin D 25-hydroxy level 35 (range )    BMP: Date: 4/22/2024 Na 144 K 4.3 Cr 0.5 BUN 18 glucose 97 Ca 9   CBC: Date: 4/22/2024 WBC 6.5 Hgb 13.4 hematocrit 40.7 platelets 442  Liver function: Date: 4/22/2024 ALT 7 AST 13 alkaline phos.  98 bilirubin 0.6 albumin 3.3 protein 6    4/22/24 vitamin D 25 hydroxy level 34    BMP: Date: 6/17/2024 Na 143 K 4.3 Cr 0.5 BUN 20 glucose 61 Ca 9.1    CBC: Date: 6/17/2024 WBC 5.3 Hgb 12.8 hematocrit 35.1 platelets 316  Liver function: Date: 6/17/2024 ALT 6 AST 14 alkaline phos.  101 bilirubin 0.4 albumin 3.3 protein 5.6    6/17/2024 vitamin D 25-hydroxy level 33    BMP: Date: 8/1/2024 Na 142 K 4.4 Cr 0.5 BUN 29 glucose 77 Ca 9.2     BMP: Date: 8/12/2024 Na 145 K 4.3 Cr 0.6 BUN 26 glucose 50 Ca 9.7 GFR 94  CBC: Date: 8/12/2024 WBC 6.2 Hgb 13.8 hematocrit 43.5 platelets 435  Liver function: Date: 8/12/2024 ALT 7 AST 18 alkaline phos.  133 bilirubin 0.9 albumin 3.8 protein 7    BMP: Date: 8/19/2024 Na 144 K 4.3 Cr 0.6 BUN 28 glucose 94 Ca 9.4 GFR 94  CBC: Date: 8/19/2024 WBC 6.7 Hgb 12.2 hematocrit 37.9 platelets 363  Liver function: Date: 8/19/2024 ALT 9 AST 14 alkaline phos.  109 bilirubin 0.6 albumin 3.7 protein 6.3    BMP: Date: 8/26/2024 Na 146 K 3.8 Cr 0.5 BUN 24 glucose 58 Ca 9.1   Liver function: Date: 8/26/2024 ALT 6 AST 13 alkaline phos.  111 bilirubin 0.5 albumin 3.4 protein 6    8/26/2024 vitamin D 25-hydroxy level 39 wnl    CBC: Date: 9/2/2024 WBC 4.9 Hgb 12.8 hematocrit 41.8 platelets 338    BMP: Date: 9/16/2024 Na 141 K 3.3 Cr 0.5 BUN 24 glucose 73 Ca 9.1   CBC: Date: 9/16/2024 WBC 7.2 Hgb 12.1 hematocrit 37.2 platelets 309  Liver function: Date: 9/16/2024 ALT 7 AST 14 alkaline phos.  93 bilirubin 0.5 albumin 3.4 protein 5.9    BMP: Date: 9/23/2024 Na 150 K 3.3 Cr 0.4 BUN 20 glucose 77 Ca 9.1   CBC: Date: 9/23/2024 WBC 5.6 Hgb 11.9 hematocrit 36.3 platelets 366  Liver function: Date: 9/23/2024 ALT 11 AST 13 alkaline phos.  80 bilirubin 0.5 albumin 3.1 protein 5.7    BMP: Date: 10/3/2024 Na 139 K 4.7 Cr 0.6 BUN 18 glucose 56 Ca 9.2 GFR 94  CBC: Date: 10/3/2024 WBC 4.4 Hgb 13.9 hematocrit 40 platelets 444    BMP: Date: 10/7/2024 Na 142 K 4.6 Cr 0.5 BUN 28 glucose 59 Ca 9.1   CBC: Date: 10/7/2024 WBC 5.3 Hgb 12.2 hematocrit 37.7 platelets 381  Liver function: Date: 10/7/2024 ALT 6 AST 11 alkaline phos.  90  "bilirubin 0.3 albumin 3.2 protein 5.7                    /74   Pulse 81   Temp 36.3 °C (97.3 °F)   Resp 16   Ht 1.575 m (5' 2\")   Wt (!) 37.6 kg (82 lb 12.8 oz)   SpO2 97%   BMI 15.14 kg/m²      Physical Exam  Vitals and nursing note reviewed. Exam conducted with a chaperone present.   Constitutional:       Appearance: Normal appearance.   HENT:      Head: Normocephalic.      Right Ear: External ear normal.      Left Ear: External ear normal.      Nose: Nose normal.      Mouth/Throat:      Mouth: Mucous membranes are moist.      Pharynx: Oropharynx is clear.   Eyes:      Extraocular Movements: Extraocular movements intact.      Conjunctiva/sclera: Conjunctivae normal.      Pupils: Pupils are equal, round, and reactive to light.   Cardiovascular:      Rate and Rhythm: Normal rate and regular rhythm.      Pulses: Normal pulses.      Heart sounds: Normal heart sounds.   Pulmonary:      Effort: Pulmonary effort is normal.      Breath sounds: Normal breath sounds.   Abdominal:      General: Bowel sounds are normal.      Palpations: Abdomen is soft.   Musculoskeletal:         General: Normal range of motion.      Cervical back: Normal range of motion and neck supple.      Comments: Generalized muscle weakness; impaired gait and mobility   Skin:     General: Skin is warm and dry.      Comments: Erythematous rash on back-upper and lower   Neurological:      Mental Status: She is alert. Mental status is at baseline. She is disoriented.      Motor: Weakness present.      Coordination: Coordination abnormal.      Gait: Gait abnormal.   Psychiatric:         Mood and Affect: Affect is inappropriate.         Speech: Speech is tangential.         Cognition and Memory: Cognition is impaired. Memory is impaired.      Comments: Dementia   A x O x 1          Assessment/Plan     Weight loss; Alzheimer's disease;Major depressive disorder:              Continue mirtazapine and donepezil.  Continue with [facility]'s hospice " care.   Monitor weights.   Continue house supplements and shakes QID.               Rash; dermatitis; xerosis:  Start hydrocortisone 2.5 % ointment BID   Monitor area.          Sequela of CVA;  Immobility; Generalized muscle weakness;  left sided weakness:  Patient had a stroke in October, 2020.  Continue Plavix.  Requires full supportive care.       Continue to use wheelchair.       Fall prevention.            Problem List Items Addressed This Visit       Dementia     Other Visit Diagnoses       Weight loss    -  Primary    Recurrent major depressive disorder, remission status unspecified (CMS-[facility])        Rash        Dermatitis        Xerosis of skin        Sequela of cerebrovascular accident        Immobility        Generalized muscle weakness        Left-sided weakness                      YIMI Jordan       Electronically Signed By: YIMI Jordan   2/9/25 10:34 PM

## 2025-02-09 VITALS
OXYGEN SATURATION: 97 % | BODY MASS INDEX: 15.24 KG/M2 | TEMPERATURE: 97.3 F | DIASTOLIC BLOOD PRESSURE: 74 MMHG | WEIGHT: 82.8 LBS | HEIGHT: 62 IN | RESPIRATION RATE: 16 BRPM | HEART RATE: 81 BPM | SYSTOLIC BLOOD PRESSURE: 103 MMHG

## 2025-02-10 NOTE — PROGRESS NOTES
Name: Shonda Solano    : 3/14/1934    Code Status: [facility]    Chief Complaint:  Follow up on weight loss;  etc....    HPI   89 year old female patient at [facility] in Long Term Care.       Medical history includes: Difficulty in walking; muscle weakness (generalized); Acquired hammer toes on the right & left foot; Personal history of other infectious and parasitic diseases; disorientation; Alzheimer's disease; unspecified; symbolic dysfunctions; other speech and language deficits following unspecified cerebrovascular disease; mood disorder due to known physiological condition, unspecified; Bipolar disorder; Displaced intertrochanteric fracture of right femur; need for assistance with personal care; chronic kidney disease; primary osteoarthritis; unspecified ankle and foot; polyneuropathy, unspecified; unspecified abnormalities of gait and mobility; other osteoporosis without current pathological fracture.      Diagnoses as follows:     Weight loss; Alzheimer's disease;Major depressive disorder:              On mirtazapine and donepezil.  Patient is a [facility] hospice patient.   Also follows up with in-house psych NP at .  No agitation reported.  Weights are trending down.  Recent weights as follows:  23 105.2 #  23 103 #  24 102.4 #  24 102.4 #  3/6/24 100 #  4/3/24 103.2 #  24 97.6 #  24 96.4 #  7/3/24 97.5 #  24 94 #  24 94.6 #  10/1/24 88.4 #  24 80 #  24 86. 8 #  24 81.4 #  24 82.8 #  Weights trending down, slightly increased increase from last month.    Also on house supplements and shakes QID.  Patient seen today.  She is in bed.  Calm, cooperative.  Mentation at baseline.   No fevers reported.  No chest pain.  No headaches or dizziness.             Rash; dermatitis; xerosis:        Has some dry erythematous areas on her back.         Reminded patient not to scratch area.               Sequela of CVA;  Immobility; Generalized  muscle weakness;  left sided weakness:  Patient had a stroke in .  On  Plavix.  Requires full supportive care.   Very weak.  In wheelchair.                             Reviewed  EMR  Reviewed medical, social, surgical and family history.  Reviewed all current medications and performed medication reconciliation.  Performed prescription drug management.  Reviewed vital signs AND lab results  Reviewed [facility] Documentation  Discussed patient with nursing.       ROS: Limited ROS due to mentation; ROS negative unless noted in HPI.     Surgical History  Problems    · History of Femur fracture repair   · History of Tonsillectomy     Family History  Mother    · Family history of   Father    · Family history of      Social History   · Never a smoker  No alcohol  No illicit drugs  Lives in long term care facility              Past Medical History:   Diagnosis Date    Other malaise 2020    Physical deconditioning    Personal history of other diseases of the digestive system     History of constipation    Personal history of other diseases of the nervous system and sense organs 2020    History of polyneuropathy    Personal history of urinary (tract) infections     History of urinary tract infection    Unspecified dementia, unspecified severity, without behavioral disturbance, psychotic disturbance, mood disturbance, and anxiety (Multi) 12/10/2022    Dementia    Unspecified diastolic (congestive) heart failure (Multi)     Diastolic heart failure    Unspecified fracture of right femur, initial encounter for closed fracture (Multi)     Femur fracture, right             Lab Results   Component Value Date    WBC 6.8 10/02/2024    HGB 12.7 10/02/2024    HCT 40.1 10/02/2024     (H) 10/02/2024    ALT 8 2023    AST 14 2023     10/02/2024    K 4.4 10/02/2024     10/02/2024    CREATININE 0.53 10/02/2024    BUN 19 10/02/2024    CO2 28 10/02/2024    TSH 2.92  10/14/2020    INR 1.0 02/20/2022    HGBA1C 5.9 10/15/2020     Outside Labs  CBC: Date: 10/12/22, WBC: 4.8, Hgb: 11.9, Hct: 35.2, PLT: 290   CBC: Date: 7/7/22, WBC: 3.9, Hgb: 12.7, Hct: 38.5, PLT: 307   BMP: Date: 10/12/22, Na: 140, K: 4.3, Cl: 107, CO2: 25, BUN: 25, Cr: 0.6, Glu: 80, Ca: 9   BMP: Date: 7/7/22, Na: 141, K: 4.6, Cl: 105, CO2: 26, BUN: 30, Cr: 0.7, Glu: 113, Ca: 9.1   Hepatic Function Tests: Date: 3/8/22, AST: 15, ALT: 11, Alk Phos: 114, T Bili: 0.4, Albumin: 3.6   Hepatic Function Tests: Date: 3/9/21, AST: 15, ALT: 14, Alk Phos: 101, T Bili: 0.6, Albumin: 3.3   Lipid panel on 10/13/20 as follows: Cholesterol 186; triglyceride 84; HDL 51; ; VLDL 17.    Vitamin D 25 hydroxy level on 10/13/20 22 L    BMP: Date: 1/10/2023 Na 142 K 4.4 Cr 0.7 BUN 27 glucose 77 Ca 9.2 GFR 79  CBC: Date: 1/10/2023 WBC 6.9 Hgb 12.8 hematocrit 38.4 platelets 398  Liver function: Date: 1/10/2023 ALT 11 AST 18 alkaline phos.  112 bilirubin 0.5 albumin 3.7 protein 6.4    BMP: Date: 4/18/2023 Na 145 K 4 Cr 0.7 BUN 25 glucose 85 Ca 8.8 GFR 79  CBC: Date: 4/18/2023 WBC 5.2 Hgb 12.6 hematocrit 38.3 platelets 298  Liver function: Date: 4/18/2023 ALT 6 AST 11 alkaline phos.  94 bilirubin 0.5 albumin 3.4 protein 5.7   4/18/23 vitamin D 25 hydroxy level 19.62 L    BMP: Date: 8/8/2023 Na 142 K 4.9 Cr 0.6 BUN 24 glucose 65 Ca 9.2 GFR 94  CBC: Date: 8/8/2023 WBC 5.4 Hgb 12.8 hematocrit 40.4 platelets 398  Liver function: Date: 8/8/2023 ALT 8 AST 18 alkaline phos.  108 bilirubin 0.6 albumin 3.7 protein 6.4    8/8/23 vitamin D hydroxy level 29.92 L    BMP: Date: 10/6/2023 Na 142 K 4.5 Cr 0.7 BUN 28 glucose 86 Ca 9.8 GFR 79  CBC: Date: 10/6/2023 WBC 6.2 Hgb 12.7 hematocrit 39.3 platelets 358    10/6/2023: TSH 2.652 within normal limits (range 0.340-5.500)    10/6/2023: Vitamin D 25-hydroxy level 32.58 within normal limits (range )    11/2/2023: Vitamin D 25-hydroxy level 33.84    BMP: Date: 12/4/2023 Na 143 K 4.3 Cr 0.6 BUN 23  glucose 76 Ca 9.8 GFR 94  Liver function: date: 12/4/2023 ALT 10 AST 17 alkaline phos.  112 bilirubin 0.7 albumin 3.8 protein 6.5    12/4/2023 vitamin D 25-hydroxy level 56 wnl    BMP: Date: 12/11/2023 Na 140 K 4.9 Cr 0.6 BUN 21 glucose 55 Ca 9.1 GFR 94  CBC: Date: 12/13/2023 WBC 5.9 Hgb 12.6 hematocrit 39.32 platelets 364  Liver function: Date: 12/11/2023 ALT 8 AST 15 alkaline phos.  97 bilirubin 0.5 albumin 3.3 protein 5.6    12/11/2023 vitamin D hydroxy level 43 wnl    BMP: Date: 1/29/2024 Na 142 K 4.1 Cr 0.7 BUN 23 glucose 81 Ca 9.7 GFR 79  CBC: Date: 1/29/2024 WBC 6.7 Hgb 13.1 hematocrit 40.6 platelets 368  Liver function: Date: 1/29/2024 ALT 14 AST 21 alkaline phos.  131 bilirubin 0.8 albumin 4 protein 6.7    BMP: Date: 3/11/2024 Na 140 K 4.1 Cr 0.6 BUN 23 glucose 129 Ca 9.4 GFR 94  CBC: Date: 3/11/2024 WBC 6.3 Hgb 12.7 hematocrit 39 platelets 381  Liver function: Date: 3/11/2024 ALT 9 AST 14 alkaline phos.  119 bilirubin 0.5 albumin 3.5 protein 6.1    3/11/2024 vitamin D 25-hydroxy level: 35 ( )    BMP: Date: 4/8/2024 Na 143 K 4.1 Cr 0.7 BUN 28 glucose 110 Ca 10.2 GFR 95  Liver function: Date: 4/8/2024 ALT 11 AST 22 alkaline phos.  134 bilirubin 1 albumin 4.1 protein 7.2    BMP: Date: 4/18/2024 Na 141 K 4.2 Cr 0.5 BUN 22 glucose 78 Ca 9.6   CBC: Date: 4/18/2024 WBC 7.8 Hgb 13.9 hematocrit 42.9 platelets 444  Liver function: Date: 4/18/2024 ALT 9 AST 15 alkaline phos.  111 bilirubin 0.5 albumin 3.6 protein 6.1    4/18/2024 vitamin D 25-hydroxy level 35 (range )    BMP: Date: 4/22/2024 Na 144 K 4.3 Cr 0.5 BUN 18 glucose 97 Ca 9   CBC: Date: 4/22/2024 WBC 6.5 Hgb 13.4 hematocrit 40.7 platelets 442  Liver function: Date: 4/22/2024 ALT 7 AST 13 alkaline phos.  98 bilirubin 0.6 albumin 3.3 protein 6    4/22/24 vitamin D 25 hydroxy level 34    BMP: Date: 6/17/2024 Na 143 K 4.3 Cr 0.5 BUN 20 glucose 61 Ca 9.1   CBC: Date: 6/17/2024 WBC 5.3 Hgb 12.8 hematocrit 35.1 platelets 316  Liver  function: Date: 6/17/2024 ALT 6 AST 14 alkaline phos.  101 bilirubin 0.4 albumin 3.3 protein 5.6    6/17/2024 vitamin D 25-hydroxy level 33    BMP: Date: 8/1/2024 Na 142 K 4.4 Cr 0.5 BUN 29 glucose 77 Ca 9.2     BMP: Date: 8/12/2024 Na 145 K 4.3 Cr 0.6 BUN 26 glucose 50 Ca 9.7 GFR 94  CBC: Date: 8/12/2024 WBC 6.2 Hgb 13.8 hematocrit 43.5 platelets 435  Liver function: Date: 8/12/2024 ALT 7 AST 18 alkaline phos.  133 bilirubin 0.9 albumin 3.8 protein 7    BMP: Date: 8/19/2024 Na 144 K 4.3 Cr 0.6 BUN 28 glucose 94 Ca 9.4 GFR 94  CBC: Date: 8/19/2024 WBC 6.7 Hgb 12.2 hematocrit 37.9 platelets 363  Liver function: Date: 8/19/2024 ALT 9 AST 14 alkaline phos.  109 bilirubin 0.6 albumin 3.7 protein 6.3    BMP: Date: 8/26/2024 Na 146 K 3.8 Cr 0.5 BUN 24 glucose 58 Ca 9.1   Liver function: Date: 8/26/2024 ALT 6 AST 13 alkaline phos.  111 bilirubin 0.5 albumin 3.4 protein 6    8/26/2024 vitamin D 25-hydroxy level 39 wnl    CBC: Date: 9/2/2024 WBC 4.9 Hgb 12.8 hematocrit 41.8 platelets 338    BMP: Date: 9/16/2024 Na 141 K 3.3 Cr 0.5 BUN 24 glucose 73 Ca 9.1   CBC: Date: 9/16/2024 WBC 7.2 Hgb 12.1 hematocrit 37.2 platelets 309  Liver function: Date: 9/16/2024 ALT 7 AST 14 alkaline phos.  93 bilirubin 0.5 albumin 3.4 protein 5.9    BMP: Date: 9/23/2024 Na 150 K 3.3 Cr 0.4 BUN 20 glucose 77 Ca 9.1   CBC: Date: 9/23/2024 WBC 5.6 Hgb 11.9 hematocrit 36.3 platelets 366  Liver function: Date: 9/23/2024 ALT 11 AST 13 alkaline phos.  80 bilirubin 0.5 albumin 3.1 protein 5.7    BMP: Date: 10/3/2024 Na 139 K 4.7 Cr 0.6 BUN 18 glucose 56 Ca 9.2 GFR 94  CBC: Date: 10/3/2024 WBC 4.4 Hgb 13.9 hematocrit 40 platelets 444    BMP: Date: 10/7/2024 Na 142 K 4.6 Cr 0.5 BUN 28 glucose 59 Ca 9.1   CBC: Date: 10/7/2024 WBC 5.3 Hgb 12.2 hematocrit 37.7 platelets 381  Liver function: Date: 10/7/2024 ALT 6 AST 11 alkaline phos.  90 bilirubin 0.3 albumin 3.2 protein 5.7                    /74   Pulse 81   Temp  "36.3 °C (97.3 °F)   Resp 16   Ht 1.575 m (5' 2\")   Wt (!) 37.6 kg (82 lb 12.8 oz)   SpO2 97%   BMI 15.14 kg/m²      Physical Exam  Vitals and nursing note reviewed. Exam conducted with a chaperone present.   Constitutional:       Appearance: Normal appearance.   HENT:      Head: Normocephalic.      Right Ear: External ear normal.      Left Ear: External ear normal.      Nose: Nose normal.      Mouth/Throat:      Mouth: Mucous membranes are moist.      Pharynx: Oropharynx is clear.   Eyes:      Extraocular Movements: Extraocular movements intact.      Conjunctiva/sclera: Conjunctivae normal.      Pupils: Pupils are equal, round, and reactive to light.   Cardiovascular:      Rate and Rhythm: Normal rate and regular rhythm.      Pulses: Normal pulses.      Heart sounds: Normal heart sounds.   Pulmonary:      Effort: Pulmonary effort is normal.      Breath sounds: Normal breath sounds.   Abdominal:      General: Bowel sounds are normal.      Palpations: Abdomen is soft.   Musculoskeletal:         General: Normal range of motion.      Cervical back: Normal range of motion and neck supple.      Comments: Generalized muscle weakness; impaired gait and mobility   Skin:     General: Skin is warm and dry.      Comments: Erythematous rash on back-upper and lower   Neurological:      Mental Status: She is alert. Mental status is at baseline. She is disoriented.      Motor: Weakness present.      Coordination: Coordination abnormal.      Gait: Gait abnormal.   Psychiatric:         Mood and Affect: Affect is inappropriate.         Speech: Speech is tangential.         Cognition and Memory: Cognition is impaired. Memory is impaired.      Comments: Dementia   A x O x 1          Assessment/Plan      Weight loss; Alzheimer's disease;Major depressive disorder:              Continue mirtazapine and donepezil.  Continue with [facility]'s hospice care.   Monitor weights.   Continue house supplements and shakes QID.               " Rash; dermatitis; xerosis:  Start hydrocortisone 2.5 % ointment BID   Monitor area.          Sequela of CVA;  Immobility; Generalized muscle weakness;  left sided weakness:  Patient had a stroke in October, 2020.  Continue Plavix.  Requires full supportive care.       Continue to use wheelchair.       Fall prevention.            Problem List Items Addressed This Visit       Dementia     Other Visit Diagnoses       Weight loss    -  Primary    Recurrent major depressive disorder, remission status unspecified (CMS-[facility])        Rash        Dermatitis        Xerosis of skin        Sequela of cerebrovascular accident        Immobility        Generalized muscle weakness        Left-sided weakness                      Sandi Rudolph, APRN-CNP

## 2025-03-07 ENCOUNTER — NURSING HOME VISIT (OUTPATIENT)
Dept: POST ACUTE CARE | Facility: EXTERNAL LOCATION | Age: OVER 89
End: 2025-03-07
Payer: COMMERCIAL

## 2025-03-07 DIAGNOSIS — R63.4 WEIGHT LOSS: ICD-10-CM

## 2025-03-07 DIAGNOSIS — G30.9 SEVERE ALZHEIMER'S DEMENTIA WITH OTHER BEHAVIORAL DISTURBANCE, UNSPECIFIED TIMING OF DEMENTIA ONSET (MULTI): Primary | ICD-10-CM

## 2025-03-07 DIAGNOSIS — F02.C18 SEVERE ALZHEIMER'S DEMENTIA WITH OTHER BEHAVIORAL DISTURBANCE, UNSPECIFIED TIMING OF DEMENTIA ONSET (MULTI): Primary | ICD-10-CM

## 2025-03-07 DIAGNOSIS — F33.9 RECURRENT MAJOR DEPRESSIVE DISORDER, REMISSION STATUS UNSPECIFIED (CMS-HCC): ICD-10-CM

## 2025-03-07 PROCEDURE — 99308 SBSQ NF CARE LOW MDM 20: CPT | Performed by: INTERNAL MEDICINE

## 2025-03-07 NOTE — LETTER
Patient: Shonda Solano  : 3/14/1934    Encounter Date: 2025     Ms Solano is a 89-year-old woman with history of Alzheimer's dementia,CVA() seen for follow up.  She is a long-term resident at [facility].  No recent falls. lying in her room today. No concerns by staff.  ROS:no changes in weight  Chest- no pain or palpitations  Lungs- no cough or SOB  Abd- no pain, no N/V/D  Ext- no swelling  Psych- memory impaired  Vital signs: /78  Temperature 98.1  Pulse 64  Weight 77 pounds  Gen- weak,NAD, pleasantly confused  Neck- supple, no jvd  Lungs: Lungs clear toauscultation  Cardio: S1-S2 normal  ABD: Soft nontender bowel sounds present  Musc/Skel: No deformities or edema  Extremities: No edema  Neuro- alert, no gross deformity  Psych: Memory impaired    Assessment and Plan:  CVA-with weakness  lost significant weight- monitor  Continue with aspirin, Plavix  continue with fall and safety precautions  Alzheimer's dementia-declining  Continue with Hospice care- c/w comfort meds      Electronically Signed By: Indiana Wen MD   3/12/25  8:55 PM

## 2025-03-12 ASSESSMENT — ENCOUNTER SYMPTOMS
DEPRESSION: 0
LOSS OF SENSATION IN FEET: 0
OCCASIONAL FEELINGS OF UNSTEADINESS: 0

## 2025-03-13 NOTE — PROGRESS NOTES
Ms Solano is a 89-year-old woman with history of Alzheimer's dementia,CVA(2020) seen for follow up.  She is a long-term resident at [facility].  No recent falls. lying in her room today. No concerns by staff.  ROS:no changes in weight  Chest- no pain or palpitations  Lungs- no cough or SOB  Abd- no pain, no N/V/D  Ext- no swelling  Psych- memory impaired  Vital signs: /78  Temperature 98.1  Pulse 64  Weight 77 pounds  Gen- weak,NAD, pleasantly confused  Neck- supple, no jvd  Lungs: Lungs clear toauscultation  Cardio: S1-S2 normal  ABD: Soft nontender bowel sounds present  Musc/Skel: No deformities or edema  Extremities: No edema  Neuro- alert, no gross deformity  Psych: Memory impaired    Assessment and Plan:  CVA-with weakness  lost significant weight- monitor  Continue with aspirin, Plavix  continue with fall and safety precautions  Alzheimer's dementia-declining  Continue with Hospice care- c/w comfort meds

## 2025-05-13 ENCOUNTER — NURSING HOME VISIT (OUTPATIENT)
Dept: POST ACUTE CARE | Facility: EXTERNAL LOCATION | Age: OVER 89
End: 2025-05-13
Payer: MEDICARE

## 2025-05-13 DIAGNOSIS — G30.9 SEVERE ALZHEIMER'S DEMENTIA WITH OTHER BEHAVIORAL DISTURBANCE, UNSPECIFIED TIMING OF DEMENTIA ONSET (MULTI): Primary | ICD-10-CM

## 2025-05-13 DIAGNOSIS — R53.1 LEFT-SIDED WEAKNESS: ICD-10-CM

## 2025-05-13 DIAGNOSIS — Z74.09 IMMOBILITY: ICD-10-CM

## 2025-05-13 DIAGNOSIS — F02.C18 SEVERE ALZHEIMER'S DEMENTIA WITH OTHER BEHAVIORAL DISTURBANCE, UNSPECIFIED TIMING OF DEMENTIA ONSET (MULTI): Primary | ICD-10-CM

## 2025-05-13 DIAGNOSIS — M62.81 GENERALIZED MUSCLE WEAKNESS: ICD-10-CM

## 2025-05-13 DIAGNOSIS — R63.4 WEIGHT LOSS: ICD-10-CM

## 2025-05-13 DIAGNOSIS — F33.9 RECURRENT MAJOR DEPRESSIVE DISORDER, REMISSION STATUS UNSPECIFIED: ICD-10-CM

## 2025-05-13 DIAGNOSIS — I69.30 SEQUELA OF CEREBROVASCULAR ACCIDENT: ICD-10-CM

## 2025-05-13 NOTE — LETTER
Patient: Shonda Solano  : 3/14/1934    Encounter Date: 2025    Name: Shonda Solano YOB: 1934    [facility]    Chief Complaint:  Follow up on Alzheimer's disease;  etc....    HPI   89 year old female patient at [facility] in [facility].       Medical history includes: Difficulty in walking; muscle weakness (generalized); Acquired hammer toes on the right & left foot; Personal history of other infectious and parasitic diseases; disorientation; Alzheimer's disease; unspecified; symbolic dysfunctions; other speech and language deficits following unspecified cerebrovascular disease; mood disorder due to known physiological condition, unspecified; Bipolar disorder; Displaced intertrochanteric fracture of right femur; need for assistance with personal care; chronic kidney disease; primary osteoarthritis; unspecified ankle and foot; polyneuropathy, unspecified; unspecified abnormalities of gait and mobility; other osteoporosis without current pathological fracture.      Diagnoses as follows:     Alzheimer's disease; Weight loss ;Major depressive disorder:              On donepezil and mirtazapine.   Patient is a [facility] hospice patient.   Also follows up with in-house psych NP at .  Dementia is severe.   No agitation reported.  Weights are trending down.  Recent weights as follows:  23 105.2 #  23 103 #  24 102.4 #  24 102.4 #  3/6/24 100 #  4/3/24 103.2 #  24 97.6 #  24 96.4 #  7/3/24 97.5 #  24 94 #  24 94.6 #  10/1/24 88.4 #  24 80 #  24 86. 8 #  25 81.4 #  25 82.8 #  3/7/25 77 #  25 74.8 #  25 77.4 #                      Weights have been trending down, slightly increased increase from last month.    Also on house supplements and shakes QID.  Patient seen today.  She is in bed.  Calm, cooperative.  Mentation at baseline.   No fevers reported.  No chest pain.  No headaches or dizziness.                          Sequela of CVA;  Immobility; left sided weakness; Generalized muscle weakness:  Patient had a stroke in .  On  Plavix.  Requires full supportive care.   Very weak.  In wheelchair.                             Reviewed  EMR  Reviewed medical, social, surgical and family history.  Reviewed all current medications and performed medication reconciliation.  Performed prescription drug management.  Reviewed vital signs AND lab results  Reviewed [facility] Documentation  Discussed patient with nursing.       ROS: Limited ROS due to mentation; ROS negative unless noted in HPI.     Surgical History  Problems    · History of Femur fracture repair   · History of Tonsillectomy     Family History  Mother    · Family history of   Father    · Family history of      Social History   · Never a smoker  No alcohol  No illicit drugs  Lives in long term care facility              Past Medical History:   Diagnosis Date   • Other malaise 2020    Physical deconditioning   • Personal history of other diseases of the digestive system     History of constipation   • Personal history of other diseases of the nervous system and sense organs 2020    History of polyneuropathy   • Personal history of urinary (tract) infections     History of urinary tract infection   • Unspecified dementia, unspecified severity, without behavioral disturbance, psychotic disturbance, mood disturbance, and anxiety 12/10/2022    Dementia   • Unspecified diastolic (congestive) heart failure     Diastolic heart failure   • Unspecified fracture of right femur, initial encounter for closed fracture     Femur fracture, right             Lab Results   Component Value Date    WBC 6.8 10/02/2024    HGB 12.7 10/02/2024    HCT 40.1 10/02/2024     (H) 10/02/2024    ALT 8 2023    AST 14 2023     10/02/2024    K 4.4 10/02/2024     10/02/2024    CREATININE 0.53 10/02/2024    BUN 19 10/02/2024     CO2 28 10/02/2024    TSH 2.92 10/14/2020    INR 1.0 02/20/2022    HGBA1C 5.9 10/15/2020     Outside Labs  CBC: Date: 10/12/22, WBC: 4.8, Hgb: 11.9, Hct: 35.2, PLT: 290   CBC: Date: 7/7/22, WBC: 3.9, Hgb: 12.7, Hct: 38.5, PLT: 307   BMP: Date: 10/12/22, Na: 140, K: 4.3, Cl: 107, CO2: 25, BUN: 25, Cr: 0.6, Glu: 80, Ca: 9   BMP: Date: 7/7/22, Na: 141, K: 4.6, Cl: 105, CO2: 26, BUN: 30, Cr: 0.7, Glu: 113, Ca: 9.1   Hepatic Function Tests: Date: 3/8/22, AST: 15, ALT: 11, Alk Phos: 114, T Bili: 0.4, Albumin: 3.6   Hepatic Function Tests: Date: 3/9/21, AST: 15, ALT: 14, Alk Phos: 101, T Bili: 0.6, Albumin: 3.3   Lipid panel on 10/13/20 as follows: Cholesterol 186; triglyceride 84; HDL 51; ; VLDL 17.    Vitamin D 25 hydroxy level on 10/13/20 22 L    BMP: Date: 1/10/2023 Na 142 K 4.4 Cr 0.7 BUN 27 glucose 77 Ca 9.2 GFR 79  CBC: Date: 1/10/2023 WBC 6.9 Hgb 12.8 hematocrit 38.4 platelets 398  Liver function: Date: 1/10/2023 ALT 11 AST 18 alkaline phos.  112 bilirubin 0.5 albumin 3.7 protein 6.4    BMP: Date: 4/18/2023 Na 145 K 4 Cr 0.7 BUN 25 glucose 85 Ca 8.8 GFR 79  CBC: Date: 4/18/2023 WBC 5.2 Hgb 12.6 hematocrit 38.3 platelets 298  Liver function: Date: 4/18/2023 ALT 6 AST 11 alkaline phos.  94 bilirubin 0.5 albumin 3.4 protein 5.7   4/18/23 vitamin D 25 hydroxy level 19.62 L    BMP: Date: 8/8/2023 Na 142 K 4.9 Cr 0.6 BUN 24 glucose 65 Ca 9.2 GFR 94  CBC: Date: 8/8/2023 WBC 5.4 Hgb 12.8 hematocrit 40.4 platelets 398  Liver function: Date: 8/8/2023 ALT 8 AST 18 alkaline phos.  108 bilirubin 0.6 albumin 3.7 protein 6.4    8/8/23 vitamin D hydroxy level 29.92 L    BMP: Date: 10/6/2023 Na 142 K 4.5 Cr 0.7 BUN 28 glucose 86 Ca 9.8 GFR 79  CBC: Date: 10/6/2023 WBC 6.2 Hgb 12.7 hematocrit 39.3 platelets 358    10/6/2023: TSH 2.652 within normal limits (range 0.340-5.500)    10/6/2023: Vitamin D 25-hydroxy level 32.58 within normal limits (range )    11/2/2023: Vitamin D 25-hydroxy level 33.84    BMP: Date:  12/4/2023 Na 143 K 4.3 Cr 0.6 BUN 23 glucose 76 Ca 9.8 GFR 94  Liver function: date: 12/4/2023 ALT 10 AST 17 alkaline phos.  112 bilirubin 0.7 albumin 3.8 protein 6.5    12/4/2023 vitamin D 25-hydroxy level 56 wnl    BMP: Date: 12/11/2023 Na 140 K 4.9 Cr 0.6 BUN 21 glucose 55 Ca 9.1 GFR 94  CBC: Date: 12/13/2023 WBC 5.9 Hgb 12.6 hematocrit 39.32 platelets 364  Liver function: Date: 12/11/2023 ALT 8 AST 15 alkaline phos.  97 bilirubin 0.5 albumin 3.3 protein 5.6    12/11/2023 vitamin D hydroxy level 43 wnl    BMP: Date: 1/29/2024 Na 142 K 4.1 Cr 0.7 BUN 23 glucose 81 Ca 9.7 GFR 79  CBC: Date: 1/29/2024 WBC 6.7 Hgb 13.1 hematocrit 40.6 platelets 368  Liver function: Date: 1/29/2024 ALT 14 AST 21 alkaline phos.  131 bilirubin 0.8 albumin 4 protein 6.7    BMP: Date: 3/11/2024 Na 140 K 4.1 Cr 0.6 BUN 23 glucose 129 Ca 9.4 GFR 94  CBC: Date: 3/11/2024 WBC 6.3 Hgb 12.7 hematocrit 39 platelets 381  Liver function: Date: 3/11/2024 ALT 9 AST 14 alkaline phos.  119 bilirubin 0.5 albumin 3.5 protein 6.1    3/11/2024 vitamin D 25-hydroxy level: 35 ( )    BMP: Date: 4/8/2024 Na 143 K 4.1 Cr 0.7 BUN 28 glucose 110 Ca 10.2 GFR 95  Liver function: Date: 4/8/2024 ALT 11 AST 22 alkaline phos.  134 bilirubin 1 albumin 4.1 protein 7.2    BMP: Date: 4/18/2024 Na 141 K 4.2 Cr 0.5 BUN 22 glucose 78 Ca 9.6   CBC: Date: 4/18/2024 WBC 7.8 Hgb 13.9 hematocrit 42.9 platelets 444  Liver function: Date: 4/18/2024 ALT 9 AST 15 alkaline phos.  111 bilirubin 0.5 albumin 3.6 protein 6.1    4/18/2024 vitamin D 25-hydroxy level 35 (range )    BMP: Date: 4/22/2024 Na 144 K 4.3 Cr 0.5 BUN 18 glucose 97 Ca 9   CBC: Date: 4/22/2024 WBC 6.5 Hgb 13.4 hematocrit 40.7 platelets 442  Liver function: Date: 4/22/2024 ALT 7 AST 13 alkaline phos.  98 bilirubin 0.6 albumin 3.3 protein 6    4/22/24 vitamin D 25 hydroxy level 34    BMP: Date: 6/17/2024 Na 143 K 4.3 Cr 0.5 BUN 20 glucose 61 Ca 9.1   CBC: Date: 6/17/2024 WBC 5.3 Hgb 12.8  hematocrit 35.1 platelets 316  Liver function: Date: 6/17/2024 ALT 6 AST 14 alkaline phos.  101 bilirubin 0.4 albumin 3.3 protein 5.6    6/17/2024 vitamin D 25-hydroxy level 33    BMP: Date: 8/1/2024 Na 142 K 4.4 Cr 0.5 BUN 29 glucose 77 Ca 9.2     BMP: Date: 8/12/2024 Na 145 K 4.3 Cr 0.6 BUN 26 glucose 50 Ca 9.7 GFR 94  CBC: Date: 8/12/2024 WBC 6.2 Hgb 13.8 hematocrit 43.5 platelets 435  Liver function: Date: 8/12/2024 ALT 7 AST 18 alkaline phos.  133 bilirubin 0.9 albumin 3.8 protein 7    BMP: Date: 8/19/2024 Na 144 K 4.3 Cr 0.6 BUN 28 glucose 94 Ca 9.4 GFR 94  CBC: Date: 8/19/2024 WBC 6.7 Hgb 12.2 hematocrit 37.9 platelets 363  Liver function: Date: 8/19/2024 ALT 9 AST 14 alkaline phos.  109 bilirubin 0.6 albumin 3.7 protein 6.3    BMP: Date: 8/26/2024 Na 146 K 3.8 Cr 0.5 BUN 24 glucose 58 Ca 9.1   Liver function: Date: 8/26/2024 ALT 6 AST 13 alkaline phos.  111 bilirubin 0.5 albumin 3.4 protein 6    8/26/2024 vitamin D 25-hydroxy level 39 wnl    CBC: Date: 9/2/2024 WBC 4.9 Hgb 12.8 hematocrit 41.8 platelets 338    BMP: Date: 9/16/2024 Na 141 K 3.3 Cr 0.5 BUN 24 glucose 73 Ca 9.1   CBC: Date: 9/16/2024 WBC 7.2 Hgb 12.1 hematocrit 37.2 platelets 309  Liver function: Date: 9/16/2024 ALT 7 AST 14 alkaline phos.  93 bilirubin 0.5 albumin 3.4 protein 5.9    BMP: Date: 9/23/2024 Na 150 K 3.3 Cr 0.4 BUN 20 glucose 77 Ca 9.1   CBC: Date: 9/23/2024 WBC 5.6 Hgb 11.9 hematocrit 36.3 platelets 366  Liver function: Date: 9/23/2024 ALT 11 AST 13 alkaline phos.  80 bilirubin 0.5 albumin 3.1 protein 5.7    BMP: Date: 10/3/2024 Na 139 K 4.7 Cr 0.6 BUN 18 glucose 56 Ca 9.2 GFR 94  CBC: Date: 10/3/2024 WBC 4.4 Hgb 13.9 hematocrit 40 platelets 444    BMP: Date: 10/7/2024 Na 142 K 4.6 Cr 0.5 BUN 28 glucose 59 Ca 9.1   CBC: Date: 10/7/2024 WBC 5.3 Hgb 12.2 hematocrit 37.7 platelets 381  Liver function: Date: 10/7/2024 ALT 6 AST 11 alkaline phos.  90 bilirubin 0.3 albumin 3.2 protein  5.7                    There were no vitals taken for this visit.     Physical Exam  Vitals and nursing note reviewed. Exam conducted with a chaperone present.   Constitutional:       Appearance: Normal appearance.   HENT:      Head: Normocephalic.      Right Ear: External ear normal.      Left Ear: External ear normal.      Nose: Nose normal.      Mouth/Throat:      Mouth: Mucous membranes are moist.      Pharynx: Oropharynx is clear.   Eyes:      Extraocular Movements: Extraocular movements intact.      Conjunctiva/sclera: Conjunctivae normal.      Pupils: Pupils are equal, round, and reactive to light.   Cardiovascular:      Rate and Rhythm: Normal rate and regular rhythm.      Pulses: Normal pulses.      Heart sounds: Normal heart sounds.   Pulmonary:      Effort: Pulmonary effort is normal.      Breath sounds: Normal breath sounds.   Abdominal:      General: Bowel sounds are normal.      Palpations: Abdomen is soft.   Musculoskeletal:         General: Normal range of motion.      Cervical back: Normal range of motion and neck supple.      Comments: Generalized muscle weakness; impaired gait and mobility   Skin:     General: Skin is warm and dry.      Comments: Erythematous rash on back-upper and lower   Neurological:      Mental Status: She is alert. Mental status is at baseline. She is disoriented.      Motor: Weakness present.      Coordination: Coordination abnormal.      Gait: Gait abnormal.   Psychiatric:         Mood and Affect: Affect is inappropriate.         Speech: Speech is tangential.         Cognition and Memory: Cognition is impaired. Memory is impaired.      Comments: Dementia   A x O x 1          Assessment/Plan    Alzheimer's disease; Weight loss ;Major depressive disorder:              Continue donepezil and mirtazapine.   Continue with [facility] hospice care in house at [facility].  Monitor weights.  Continue in house supplements and shakes QID.                    Sequela of CVA;   Immobility; left sided weakness; Generalized muscle weakness:           Patient had a stroke in October, 2020.           Continues Plavix.            Requires full supportive care.       Problem List Items Addressed This Visit       Dementia - Primary     Other Visit Diagnoses         Weight loss          Recurrent major depressive disorder, remission status unspecified          Sequela of cerebrovascular accident          Immobility          Left-sided weakness          Generalized muscle weakness                  YIMI Jordan     Electronically Signed By: YIMI Jordan   5/17/25  7:39 PM

## 2025-05-17 VITALS
RESPIRATION RATE: 18 BRPM | BODY MASS INDEX: 14.24 KG/M2 | TEMPERATURE: 98.1 F | SYSTOLIC BLOOD PRESSURE: 122 MMHG | HEART RATE: 67 BPM | DIASTOLIC BLOOD PRESSURE: 76 MMHG | OXYGEN SATURATION: 97 % | HEIGHT: 62 IN | WEIGHT: 77.4 LBS

## 2025-05-17 NOTE — PROGRESS NOTES
Name: Shonda Solano    : 3/14/1934    Code Status: [facility]    Chief Complaint:  Follow up on Alzheimer's disease;  etc....    HPI   89 year old female patient at [facility] in Long Term Care.       Medical history includes: Difficulty in walking; muscle weakness (generalized); Acquired hammer toes on the right & left foot; Personal history of other infectious and parasitic diseases; disorientation; Alzheimer's disease; unspecified; symbolic dysfunctions; other speech and language deficits following unspecified cerebrovascular disease; mood disorder due to known physiological condition, unspecified; Bipolar disorder; Displaced intertrochanteric fracture of right femur; need for assistance with personal care; chronic kidney disease; primary osteoarthritis; unspecified ankle and foot; polyneuropathy, unspecified; unspecified abnormalities of gait and mobility; other osteoporosis without current pathological fracture.      Diagnoses as follows:     Alzheimer's disease; Weight loss ;Major depressive disorder:              On donepezil and mirtazapine.   Patient is a [facility] hospice patient.   Also follows up with in-house psych NP at .  Dementia is severe.   No agitation reported.  Weights are trending down.  Recent weights as follows:  23 105.2 #  23 103 #  24 102.4 #  24 102.4 #  3/6/24 100 #  4/3/24 103.2 #  24 97.6 #  24 96.4 #  7/3/24 97.5 #  24 94 #  24 94.6 #  10/1/24 88.4 #  24 80 #  24 86. 8 #  25 81.4 #  25 82.8 #  3/7/25 77 #  25 74.8 #  25 77.4 #                      Weights have been trending down, slightly increased increase from last month.    Also on house supplements and shakes QID.  Patient seen today.  She is in bed.  Calm, cooperative.  Mentation at baseline.   No fevers reported.  No chest pain.  No headaches or dizziness.                         Sequela of CVA;  Immobility; left sided weakness; Generalized  muscle weakness:  Patient had a stroke in .  On  Plavix.  Requires full supportive care.   Very weak.  In wheelchair.                             Reviewed  EMR  Reviewed medical, social, surgical and family history.  Reviewed all current medications and performed medication reconciliation.  Performed prescription drug management.  Reviewed vital signs AND lab results  Reviewed [facility] Care Documentation  Discussed patient with nursing.       ROS: Limited ROS due to mentation; ROS negative unless noted in HPI.     Surgical History  Problems    · History of Femur fracture repair   · History of Tonsillectomy     Family History  Mother    · Family history of   Father    · Family history of      Social History   · Never a smoker  No alcohol  No illicit drugs  Lives in long term care facility              Past Medical History:   Diagnosis Date    Other malaise 2020    Physical deconditioning    Personal history of other diseases of the digestive system     History of constipation    Personal history of other diseases of the nervous system and sense organs 2020    History of polyneuropathy    Personal history of urinary (tract) infections     History of urinary tract infection    Unspecified dementia, unspecified severity, without behavioral disturbance, psychotic disturbance, mood disturbance, and anxiety 12/10/2022    Dementia    Unspecified diastolic (congestive) heart failure     Diastolic heart failure    Unspecified fracture of right femur, initial encounter for closed fracture     Femur fracture, right             Lab Results   Component Value Date    WBC 6.8 10/02/2024    HGB 12.7 10/02/2024    HCT 40.1 10/02/2024     (H) 10/02/2024    ALT 8 2023    AST 14 2023     10/02/2024    K 4.4 10/02/2024     10/02/2024    CREATININE 0.53 10/02/2024    BUN 19 10/02/2024    CO2 28 10/02/2024    TSH 2.92 10/14/2020    INR 1.0 2022    HGBA1C  5.9 10/15/2020     Outside Labs  CBC: Date: 10/12/22, WBC: 4.8, Hgb: 11.9, Hct: 35.2, PLT: 290   CBC: Date: 7/7/22, WBC: 3.9, Hgb: 12.7, Hct: 38.5, PLT: 307   BMP: Date: 10/12/22, Na: 140, K: 4.3, Cl: 107, CO2: 25, BUN: 25, Cr: 0.6, Glu: 80, Ca: 9   BMP: Date: 7/7/22, Na: 141, K: 4.6, Cl: 105, CO2: 26, BUN: 30, Cr: 0.7, Glu: 113, Ca: 9.1   Hepatic Function Tests: Date: 3/8/22, AST: 15, ALT: 11, Alk Phos: 114, T Bili: 0.4, Albumin: 3.6   Hepatic Function Tests: Date: 3/9/21, AST: 15, ALT: 14, Alk Phos: 101, T Bili: 0.6, Albumin: 3.3   Lipid panel on 10/13/20 as follows: Cholesterol 186; triglyceride 84; HDL 51; ; VLDL 17.    Vitamin D 25 hydroxy level on 10/13/20 22 L    BMP: Date: 1/10/2023 Na 142 K 4.4 Cr 0.7 BUN 27 glucose 77 Ca 9.2 GFR 79  CBC: Date: 1/10/2023 WBC 6.9 Hgb 12.8 hematocrit 38.4 platelets 398  Liver function: Date: 1/10/2023 ALT 11 AST 18 alkaline phos.  112 bilirubin 0.5 albumin 3.7 protein 6.4    BMP: Date: 4/18/2023 Na 145 K 4 Cr 0.7 BUN 25 glucose 85 Ca 8.8 GFR 79  CBC: Date: 4/18/2023 WBC 5.2 Hgb 12.6 hematocrit 38.3 platelets 298  Liver function: Date: 4/18/2023 ALT 6 AST 11 alkaline phos.  94 bilirubin 0.5 albumin 3.4 protein 5.7   4/18/23 vitamin D 25 hydroxy level 19.62 L    BMP: Date: 8/8/2023 Na 142 K 4.9 Cr 0.6 BUN 24 glucose 65 Ca 9.2 GFR 94  CBC: Date: 8/8/2023 WBC 5.4 Hgb 12.8 hematocrit 40.4 platelets 398  Liver function: Date: 8/8/2023 ALT 8 AST 18 alkaline phos.  108 bilirubin 0.6 albumin 3.7 protein 6.4    8/8/23 vitamin D hydroxy level 29.92 L    BMP: Date: 10/6/2023 Na 142 K 4.5 Cr 0.7 BUN 28 glucose 86 Ca 9.8 GFR 79  CBC: Date: 10/6/2023 WBC 6.2 Hgb 12.7 hematocrit 39.3 platelets 358    10/6/2023: TSH 2.652 within normal limits (range 0.340-5.500)    10/6/2023: Vitamin D 25-hydroxy level 32.58 within normal limits (range )    11/2/2023: Vitamin D 25-hydroxy level 33.84    BMP: Date: 12/4/2023 Na 143 K 4.3 Cr 0.6 BUN 23 glucose 76 Ca 9.8 GFR 94  Liver function:  date: 12/4/2023 ALT 10 AST 17 alkaline phos.  112 bilirubin 0.7 albumin 3.8 protein 6.5    12/4/2023 vitamin D 25-hydroxy level 56 wnl    BMP: Date: 12/11/2023 Na 140 K 4.9 Cr 0.6 BUN 21 glucose 55 Ca 9.1 GFR 94  CBC: Date: 12/13/2023 WBC 5.9 Hgb 12.6 hematocrit 39.32 platelets 364  Liver function: Date: 12/11/2023 ALT 8 AST 15 alkaline phos.  97 bilirubin 0.5 albumin 3.3 protein 5.6    12/11/2023 vitamin D hydroxy level 43 wnl    BMP: Date: 1/29/2024 Na 142 K 4.1 Cr 0.7 BUN 23 glucose 81 Ca 9.7 GFR 79  CBC: Date: 1/29/2024 WBC 6.7 Hgb 13.1 hematocrit 40.6 platelets 368  Liver function: Date: 1/29/2024 ALT 14 AST 21 alkaline phos.  131 bilirubin 0.8 albumin 4 protein 6.7    BMP: Date: 3/11/2024 Na 140 K 4.1 Cr 0.6 BUN 23 glucose 129 Ca 9.4 GFR 94  CBC: Date: 3/11/2024 WBC 6.3 Hgb 12.7 hematocrit 39 platelets 381  Liver function: Date: 3/11/2024 ALT 9 AST 14 alkaline phos.  119 bilirubin 0.5 albumin 3.5 protein 6.1    3/11/2024 vitamin D 25-hydroxy level: 35 ( )    BMP: Date: 4/8/2024 Na 143 K 4.1 Cr 0.7 BUN 28 glucose 110 Ca 10.2 GFR 95  Liver function: Date: 4/8/2024 ALT 11 AST 22 alkaline phos.  134 bilirubin 1 albumin 4.1 protein 7.2    BMP: Date: 4/18/2024 Na 141 K 4.2 Cr 0.5 BUN 22 glucose 78 Ca 9.6   CBC: Date: 4/18/2024 WBC 7.8 Hgb 13.9 hematocrit 42.9 platelets 444  Liver function: Date: 4/18/2024 ALT 9 AST 15 alkaline phos.  111 bilirubin 0.5 albumin 3.6 protein 6.1    4/18/2024 vitamin D 25-hydroxy level 35 (range )    BMP: Date: 4/22/2024 Na 144 K 4.3 Cr 0.5 BUN 18 glucose 97 Ca 9   CBC: Date: 4/22/2024 WBC 6.5 Hgb 13.4 hematocrit 40.7 platelets 442  Liver function: Date: 4/22/2024 ALT 7 AST 13 alkaline phos.  98 bilirubin 0.6 albumin 3.3 protein 6    4/22/24 vitamin D 25 hydroxy level 34    BMP: Date: 6/17/2024 Na 143 K 4.3 Cr 0.5 BUN 20 glucose 61 Ca 9.1   CBC: Date: 6/17/2024 WBC 5.3 Hgb 12.8 hematocrit 35.1 platelets 316  Liver function: Date: 6/17/2024 ALT 6 AST 14  alkaline phos.  101 bilirubin 0.4 albumin 3.3 protein 5.6    6/17/2024 vitamin D 25-hydroxy level 33    BMP: Date: 8/1/2024 Na 142 K 4.4 Cr 0.5 BUN 29 glucose 77 Ca 9.2     BMP: Date: 8/12/2024 Na 145 K 4.3 Cr 0.6 BUN 26 glucose 50 Ca 9.7 GFR 94  CBC: Date: 8/12/2024 WBC 6.2 Hgb 13.8 hematocrit 43.5 platelets 435  Liver function: Date: 8/12/2024 ALT 7 AST 18 alkaline phos.  133 bilirubin 0.9 albumin 3.8 protein 7    BMP: Date: 8/19/2024 Na 144 K 4.3 Cr 0.6 BUN 28 glucose 94 Ca 9.4 GFR 94  CBC: Date: 8/19/2024 WBC 6.7 Hgb 12.2 hematocrit 37.9 platelets 363  Liver function: Date: 8/19/2024 ALT 9 AST 14 alkaline phos.  109 bilirubin 0.6 albumin 3.7 protein 6.3    BMP: Date: 8/26/2024 Na 146 K 3.8 Cr 0.5 BUN 24 glucose 58 Ca 9.1   Liver function: Date: 8/26/2024 ALT 6 AST 13 alkaline phos.  111 bilirubin 0.5 albumin 3.4 protein 6    8/26/2024 vitamin D 25-hydroxy level 39 wnl    CBC: Date: 9/2/2024 WBC 4.9 Hgb 12.8 hematocrit 41.8 platelets 338    BMP: Date: 9/16/2024 Na 141 K 3.3 Cr 0.5 BUN 24 glucose 73 Ca 9.1   CBC: Date: 9/16/2024 WBC 7.2 Hgb 12.1 hematocrit 37.2 platelets 309  Liver function: Date: 9/16/2024 ALT 7 AST 14 alkaline phos.  93 bilirubin 0.5 albumin 3.4 protein 5.9    BMP: Date: 9/23/2024 Na 150 K 3.3 Cr 0.4 BUN 20 glucose 77 Ca 9.1   CBC: Date: 9/23/2024 WBC 5.6 Hgb 11.9 hematocrit 36.3 platelets 366  Liver function: Date: 9/23/2024 ALT 11 AST 13 alkaline phos.  80 bilirubin 0.5 albumin 3.1 protein 5.7    BMP: Date: 10/3/2024 Na 139 K 4.7 Cr 0.6 BUN 18 glucose 56 Ca 9.2 GFR 94  CBC: Date: 10/3/2024 WBC 4.4 Hgb 13.9 hematocrit 40 platelets 444    BMP: Date: 10/7/2024 Na 142 K 4.6 Cr 0.5 BUN 28 glucose 59 Ca 9.1   CBC: Date: 10/7/2024 WBC 5.3 Hgb 12.2 hematocrit 37.7 platelets 381  Liver function: Date: 10/7/2024 ALT 6 AST 11 alkaline phos.  90 bilirubin 0.3 albumin 3.2 protein 5.7                    /76   Pulse 67   Temp 36.7 °C (98.1 °F)   Resp 18   Ht 1.575  "m (5' 2\")   Wt (!) 35.1 kg (77 lb 6.4 oz)   SpO2 97%   BMI 14.16 kg/m²      Physical Exam  Vitals and nursing note reviewed. Exam conducted with a chaperone present.   Constitutional:       Appearance: Normal appearance.   HENT:      Head: Normocephalic.      Right Ear: External ear normal.      Left Ear: External ear normal.      Nose: Nose normal.      Mouth/Throat:      Mouth: Mucous membranes are moist.      Pharynx: Oropharynx is clear.   Eyes:      Extraocular Movements: Extraocular movements intact.      Conjunctiva/sclera: Conjunctivae normal.      Pupils: Pupils are equal, round, and reactive to light.   Cardiovascular:      Rate and Rhythm: Normal rate and regular rhythm.      Pulses: Normal pulses.      Heart sounds: Normal heart sounds.   Pulmonary:      Effort: Pulmonary effort is normal.      Breath sounds: Normal breath sounds.   Abdominal:      General: Bowel sounds are normal.      Palpations: Abdomen is soft.   Musculoskeletal:         General: Normal range of motion.      Cervical back: Normal range of motion and neck supple.      Comments: Generalized muscle weakness; impaired gait and mobility   Skin:     General: Skin is warm and dry.      Comments: Erythematous rash on back-upper and lower   Neurological:      Mental Status: She is alert. Mental status is at baseline. She is disoriented.      Motor: Weakness present.      Coordination: Coordination abnormal.      Gait: Gait abnormal.   Psychiatric:         Mood and Affect: Affect is inappropriate.         Speech: Speech is tangential.         Cognition and Memory: Cognition is impaired. Memory is impaired.      Comments: Dementia   A x O x 1          Assessment/Plan     Alzheimer's disease; Weight loss ;Major depressive disorder:              Continue donepezil and mirtazapine.   Continue with [facility]'s hospice care in house at [facility].  Monitor weights.  Continue in house supplements and shakes QID.                    Sequela of " CVA;  Immobility; left sided weakness; Generalized muscle weakness:           Patient had a stroke in October, 2020.           Continues Plavix.            Requires full supportive care.       Problem List Items Addressed This Visit       Dementia - Primary     Other Visit Diagnoses         Weight loss          Recurrent major depressive disorder, remission status unspecified          Sequela of cerebrovascular accident          Immobility          Left-sided weakness          Generalized muscle weakness                  Sandi Rudolph, APRN-CNP

## 2025-05-28 ENCOUNTER — NURSING HOME VISIT (OUTPATIENT)
Dept: POST ACUTE CARE | Facility: EXTERNAL LOCATION | Age: OVER 89
End: 2025-05-28
Payer: MEDICARE

## 2025-05-28 DIAGNOSIS — F31.9 BIPOLAR DEPRESSION (MULTI): ICD-10-CM

## 2025-05-28 DIAGNOSIS — M62.81 GENERALIZED MUSCLE WEAKNESS: ICD-10-CM

## 2025-05-28 DIAGNOSIS — R53.1 LEFT-SIDED WEAKNESS: ICD-10-CM

## 2025-05-28 DIAGNOSIS — Z86.73 HISTORY OF CVA (CEREBROVASCULAR ACCIDENT): ICD-10-CM

## 2025-05-28 DIAGNOSIS — R63.4 WEIGHT LOSS: Primary | ICD-10-CM

## 2025-05-28 NOTE — LETTER
Patient: Shonda Solano  : 3/14/1934    Encounter Date: 2025     Ms Solano is a 89-year-old woman with history of Alzheimer's dementia,CVA() seen for follow up.  She is a long-term resident at [facility].  No recent falls. lying in her broda chair. No concerns by staff.  ROS:no changes in weight  Chest- no pain orpalpitations  Lungs- no cough or SOB  Abd- no pain, no N/V/D  Ext- no swelling  Psych- memory impaired  Vital signs: /78  Temperature 98.1  Pulse 64  Weight 77 pounds  Gen- weak,NAD, pleasantly confused  Neck- supple, no jvd  Lungs: Lungs clear to auscultation  Cardio: S1-S2 normal  ABD: Soft nontender bowel sounds present  Musc/Skel: No deformities or edema  Extremities: No edema  Neuro- alert, no gross deformity  Psych: Memory impaired    Assessment and Plan:  Weight loss- c/w remeron  H/O CVA-with weakness  Continue with aspirin, Plavix  continue with fall and safety precautions  Alzheimer's dementia-declining  Continue with Hospice care- c/w comfort meds    Electronically Signed By: Indiana Wen MD   25 11:27 AM

## 2025-05-29 PROBLEM — I50.32 CHRONIC DIASTOLIC HEART FAILURE: Status: RESOLVED | Noted: 2023-08-25 | Resolved: 2025-05-29

## 2025-05-29 NOTE — PROGRESS NOTES
Ms Solano is a 89-year-old woman with history of Alzheimer's dementia,CVA(2020) seen for follow up.  She is a long-term resident at [facility].  No recent falls. lying in her broda chair. No concerns by staff.  ROS:no changes in weight  Chest- no pain orpalpitations  Lungs- no cough or SOB  Abd- no pain, no N/V/D  Ext- no swelling  Psych- memory impaired  Vital signs: /78  Temperature 98.1  Pulse 64  Weight 77 pounds  Gen- weak,NAD, pleasantly confused  Neck- supple, no jvd  Lungs: Lungs clear to auscultation  Cardio: S1-S2 normal  ABD: Soft nontender bowel sounds present  Musc/Skel: No deformities or edema  Extremities: No edema  Neuro- alert, no gross deformity  Psych: Memory impaired    Assessment and Plan:  Weight loss- c/w remeron  H/O CVA-with weakness  Continue with aspirin, Plavix  continue with fall and safety precautions  Alzheimer's dementia-declining  Continue with Hospice care- c/w comfort meds

## 2025-07-29 ENCOUNTER — NURSING HOME VISIT (OUTPATIENT)
Dept: POST ACUTE CARE | Facility: EXTERNAL LOCATION | Age: OVER 89
End: 2025-07-29
Payer: COMMERCIAL

## 2025-07-29 DIAGNOSIS — R63.4 WEIGHT LOSS: Primary | ICD-10-CM

## 2025-07-29 DIAGNOSIS — M62.81 GENERALIZED MUSCLE WEAKNESS: ICD-10-CM

## 2025-07-29 DIAGNOSIS — R53.1 LEFT-SIDED WEAKNESS: ICD-10-CM

## 2025-07-29 DIAGNOSIS — I69.30 SEQUELA OF CEREBROVASCULAR ACCIDENT: ICD-10-CM

## 2025-07-29 DIAGNOSIS — R11.2 NAUSEA AND VOMITING, UNSPECIFIED VOMITING TYPE: ICD-10-CM

## 2025-07-29 DIAGNOSIS — F33.9 RECURRENT MAJOR DEPRESSIVE DISORDER, REMISSION STATUS UNSPECIFIED: ICD-10-CM

## 2025-07-29 DIAGNOSIS — F02.C18 SEVERE ALZHEIMER'S DEMENTIA WITH OTHER BEHAVIORAL DISTURBANCE, UNSPECIFIED TIMING OF DEMENTIA ONSET (MULTI): ICD-10-CM

## 2025-07-29 DIAGNOSIS — Z74.09 IMMOBILITY: ICD-10-CM

## 2025-07-29 DIAGNOSIS — G30.9 SEVERE ALZHEIMER'S DEMENTIA WITH OTHER BEHAVIORAL DISTURBANCE, UNSPECIFIED TIMING OF DEMENTIA ONSET (MULTI): ICD-10-CM

## 2025-07-29 PROCEDURE — 99308 SBSQ NF CARE LOW MDM 20: CPT | Performed by: NURSE PRACTITIONER

## 2025-07-29 NOTE — LETTER
Patient: Shonda Solano  : 3/14/1934    Encounter Date: 2025    Name: Shonda Solano YOB: 1934    Code Status: [facility]    Chief Complaint:  Follow up on weight loss;  etc....    HPI   89 year old female patient at [facility] in Long Term Care.       Medical history includes: Difficulty in walking; muscle weakness (generalized); Acquired hammer toes on the right & left foot; Personal history of other infectious and parasitic diseases; disorientation; Alzheimer's disease; unspecified; symbolic dysfunctions; other speech and language deficits following unspecified cerebrovascular disease; mood disorder due to known physiological condition, unspecified; Bipolar disorder; Displaced intertrochanteric fracture of right femur; need for assistance with personal care; chronic kidney disease; primary osteoarthritis; unspecified ankle and foot; polyneuropathy, unspecified; unspecified abnormalities of gait and mobility; other osteoporosis without current pathological fracture.      Diagnoses as follows:     Weight loss ; Alzheimer's disease; ;Major depressive disorder:              On donepezil and mirtazapine.   Patient is a [facility] hospice patient.   Staff is assisting patient with meals.  Also follows up with in-house psych NP at .  Dementia is severe.   No agitation reported.  Weights are trending down.  Recent weights as follows:  23 105.2 #  23 103 #  24 102.4 #  24 102.4 #  3/6/24 100 #  4/3/24 103.2 #  24 97.6 #  24 96.4 #  7/3/24 97.5 #  24 94 #  24 94.6 #  10/1/24 88.4 #  24 80 #  24 86. 8 #  25 81.4 #  25 82.8 #  3/7/25 77 #  25 74.8 #  25 77.4 #  25 77.2 #  25 73.4 #                      Weights have been trending down.   Also on house supplements and shakes QID.  Patient seen today.  She is in bed.  Calm, cooperative.  Mentation at baseline.   No fevers reported.  No chest pain.  No headaches or  dizziness.     Nausea and vomiting:  On Zofran PRN.   No recent N/V reported.                         Sequela of CVA; Generalized muscle weakness;  Immobility; left sided weakness; :  Patient had a stroke in .  On  Plavix.  Requires full supportive care.   Very weak.  In wheelchair.                             Reviewed  EMR  Reviewed medical, social, surgical and family history.  Reviewed all current medications and performed medication reconciliation.  Performed prescription drug management.  Reviewed vital signs AND lab results  Reviewed [facility] Care Documentation  Discussed patient with nursing.       ROS: Limited ROS due to mentation; ROS negative unless noted in HPI.     Surgical History  Problems    · History of Femur fracture repair   · History of Tonsillectomy     Family History  Mother    · Family history of   Father    · Family history of      Social History   · Never a smoker  No alcohol  No illicit drugs  Lives in long term care facility              Past Medical History:   Diagnosis Date   • Other malaise 2020    Physical deconditioning   • Personal history of other diseases of the digestive system     History of constipation   • Personal history of other diseases of the nervous system and sense organs 2020    History of polyneuropathy   • Personal history of urinary (tract) infections     History of urinary tract infection   • Unspecified dementia, unspecified severity, without behavioral disturbance, psychotic disturbance, mood disturbance, and anxiety 12/10/2022    Dementia   • Unspecified diastolic (congestive) heart failure     Diastolic heart failure   • Unspecified fracture of right femur, initial encounter for closed fracture     Femur fracture, right             Lab Results   Component Value Date    WBC 6.8 10/02/2024    HGB 12.7 10/02/2024    HCT 40.1 10/02/2024     (H) 10/02/2024    ALT 8 2023    AST 14 2023      10/02/2024    K 4.4 10/02/2024     10/02/2024    CREATININE 0.53 10/02/2024    BUN 19 10/02/2024    CO2 28 10/02/2024    TSH 2.92 10/14/2020    INR 1.0 02/20/2022    HGBA1C 5.9 10/15/2020     Outside Labs  CBC: Date: 10/12/22, WBC: 4.8, Hgb: 11.9, Hct: 35.2, PLT: 290   CBC: Date: 7/7/22, WBC: 3.9, Hgb: 12.7, Hct: 38.5, PLT: 307   BMP: Date: 10/12/22, Na: 140, K: 4.3, Cl: 107, CO2: 25, BUN: 25, Cr: 0.6, Glu: 80, Ca: 9   BMP: Date: 7/7/22, Na: 141, K: 4.6, Cl: 105, CO2: 26, BUN: 30, Cr: 0.7, Glu: 113, Ca: 9.1   Hepatic Function Tests: Date: 3/8/22, AST: 15, ALT: 11, Alk Phos: 114, T Bili: 0.4, Albumin: 3.6   Hepatic Function Tests: Date: 3/9/21, AST: 15, ALT: 14, Alk Phos: 101, T Bili: 0.6, Albumin: 3.3   Lipid panel on 10/13/20 as follows: Cholesterol 186; triglyceride 84; HDL 51; ; VLDL 17.    Vitamin D 25 hydroxy level on 10/13/20 22 L    BMP: Date: 1/10/2023 Na 142 K 4.4 Cr 0.7 BUN 27 glucose 77 Ca 9.2 GFR 79  CBC: Date: 1/10/2023 WBC 6.9 Hgb 12.8 hematocrit 38.4 platelets 398  Liver function: Date: 1/10/2023 ALT 11 AST 18 alkaline phos.  112 bilirubin 0.5 albumin 3.7 protein 6.4    BMP: Date: 4/18/2023 Na 145 K 4 Cr 0.7 BUN 25 glucose 85 Ca 8.8 GFR 79  CBC: Date: 4/18/2023 WBC 5.2 Hgb 12.6 hematocrit 38.3 platelets 298  Liver function: Date: 4/18/2023 ALT 6 AST 11 alkaline phos.  94 bilirubin 0.5 albumin 3.4 protein 5.7   4/18/23 vitamin D 25 hydroxy level 19.62 L    BMP: Date: 8/8/2023 Na 142 K 4.9 Cr 0.6 BUN 24 glucose 65 Ca 9.2 GFR 94  CBC: Date: 8/8/2023 WBC 5.4 Hgb 12.8 hematocrit 40.4 platelets 398  Liver function: Date: 8/8/2023 ALT 8 AST 18 alkaline phos.  108 bilirubin 0.6 albumin 3.7 protein 6.4    8/8/23 vitamin D hydroxy level 29.92 L    BMP: Date: 10/6/2023 Na 142 K 4.5 Cr 0.7 BUN 28 glucose 86 Ca 9.8 GFR 79  CBC: Date: 10/6/2023 WBC 6.2 Hgb 12.7 hematocrit 39.3 platelets 358    10/6/2023: TSH 2.652 within normal limits (range 0.340-5.500)    10/6/2023: Vitamin D 25-hydroxy level 32.58  within normal limits (range )    11/2/2023: Vitamin D 25-hydroxy level 33.84    BMP: Date: 12/4/2023 Na 143 K 4.3 Cr 0.6 BUN 23 glucose 76 Ca 9.8 GFR 94  Liver function: date: 12/4/2023 ALT 10 AST 17 alkaline phos.  112 bilirubin 0.7 albumin 3.8 protein 6.5    12/4/2023 vitamin D 25-hydroxy level 56 wnl    BMP: Date: 12/11/2023 Na 140 K 4.9 Cr 0.6 BUN 21 glucose 55 Ca 9.1 GFR 94  CBC: Date: 12/13/2023 WBC 5.9 Hgb 12.6 hematocrit 39.32 platelets 364  Liver function: Date: 12/11/2023 ALT 8 AST 15 alkaline phos.  97 bilirubin 0.5 albumin 3.3 protein 5.6    12/11/2023 vitamin D hydroxy level 43 wnl    BMP: Date: 1/29/2024 Na 142 K 4.1 Cr 0.7 BUN 23 glucose 81 Ca 9.7 GFR 79  CBC: Date: 1/29/2024 WBC 6.7 Hgb 13.1 hematocrit 40.6 platelets 368  Liver function: Date: 1/29/2024 ALT 14 AST 21 alkaline phos.  131 bilirubin 0.8 albumin 4 protein 6.7    BMP: Date: 3/11/2024 Na 140 K 4.1 Cr 0.6 BUN 23 glucose 129 Ca 9.4 GFR 94  CBC: Date: 3/11/2024 WBC 6.3 Hgb 12.7 hematocrit 39 platelets 381  Liver function: Date: 3/11/2024 ALT 9 AST 14 alkaline phos.  119 bilirubin 0.5 albumin 3.5 protein 6.1    3/11/2024 vitamin D 25-hydroxy level: 35 ( )    BMP: Date: 4/8/2024 Na 143 K 4.1 Cr 0.7 BUN 28 glucose 110 Ca 10.2 GFR 95  Liver function: Date: 4/8/2024 ALT 11 AST 22 alkaline phos.  134 bilirubin 1 albumin 4.1 protein 7.2    BMP: Date: 4/18/2024 Na 141 K 4.2 Cr 0.5 BUN 22 glucose 78 Ca 9.6   CBC: Date: 4/18/2024 WBC 7.8 Hgb 13.9 hematocrit 42.9 platelets 444  Liver function: Date: 4/18/2024 ALT 9 AST 15 alkaline phos.  111 bilirubin 0.5 albumin 3.6 protein 6.1    4/18/2024 vitamin D 25-hydroxy level 35 (range )    BMP: Date: 4/22/2024 Na 144 K 4.3 Cr 0.5 BUN 18 glucose 97 Ca 9   CBC: Date: 4/22/2024 WBC 6.5 Hgb 13.4 hematocrit 40.7 platelets 442  Liver function: Date: 4/22/2024 ALT 7 AST 13 alkaline phos.  98 bilirubin 0.6 albumin 3.3 protein 6    4/22/24 vitamin D 25 hydroxy level 34    BMP: Date:  6/17/2024 Na 143 K 4.3 Cr 0.5 BUN 20 glucose 61 Ca 9.1   CBC: Date: 6/17/2024 WBC 5.3 Hgb 12.8 hematocrit 35.1 platelets 316  Liver function: Date: 6/17/2024 ALT 6 AST 14 alkaline phos.  101 bilirubin 0.4 albumin 3.3 protein 5.6    6/17/2024 vitamin D 25-hydroxy level 33    BMP: Date: 8/1/2024 Na 142 K 4.4 Cr 0.5 BUN 29 glucose 77 Ca 9.2     BMP: Date: 8/12/2024 Na 145 K 4.3 Cr 0.6 BUN 26 glucose 50 Ca 9.7 GFR 94  CBC: Date: 8/12/2024 WBC 6.2 Hgb 13.8 hematocrit 43.5 platelets 435  Liver function: Date: 8/12/2024 ALT 7 AST 18 alkaline phos.  133 bilirubin 0.9 albumin 3.8 protein 7    BMP: Date: 8/19/2024 Na 144 K 4.3 Cr 0.6 BUN 28 glucose 94 Ca 9.4 GFR 94  CBC: Date: 8/19/2024 WBC 6.7 Hgb 12.2 hematocrit 37.9 platelets 363  Liver function: Date: 8/19/2024 ALT 9 AST 14 alkaline phos.  109 bilirubin 0.6 albumin 3.7 protein 6.3    BMP: Date: 8/26/2024 Na 146 K 3.8 Cr 0.5 BUN 24 glucose 58 Ca 9.1   Liver function: Date: 8/26/2024 ALT 6 AST 13 alkaline phos.  111 bilirubin 0.5 albumin 3.4 protein 6    8/26/2024 vitamin D 25-hydroxy level 39 wnl    CBC: Date: 9/2/2024 WBC 4.9 Hgb 12.8 hematocrit 41.8 platelets 338    BMP: Date: 9/16/2024 Na 141 K 3.3 Cr 0.5 BUN 24 glucose 73 Ca 9.1   CBC: Date: 9/16/2024 WBC 7.2 Hgb 12.1 hematocrit 37.2 platelets 309  Liver function: Date: 9/16/2024 ALT 7 AST 14 alkaline phos.  93 bilirubin 0.5 albumin 3.4 protein 5.9    BMP: Date: 9/23/2024 Na 150 K 3.3 Cr 0.4 BUN 20 glucose 77 Ca 9.1   CBC: Date: 9/23/2024 WBC 5.6 Hgb 11.9 hematocrit 36.3 platelets 366  Liver function: Date: 9/23/2024 ALT 11 AST 13 alkaline phos.  80 bilirubin 0.5 albumin 3.1 protein 5.7    BMP: Date: 10/3/2024 Na 139 K 4.7 Cr 0.6 BUN 18 glucose 56 Ca 9.2 GFR 94  CBC: Date: 10/3/2024 WBC 4.4 Hgb 13.9 hematocrit 40 platelets 444    BMP: Date: 10/7/2024 Na 142 K 4.6 Cr 0.5 BUN 28 glucose 59 Ca 9.1   CBC: Date: 10/7/2024 WBC 5.3 Hgb 12.2 hematocrit 37.7 platelets 381  Liver  "function: Date: 10/7/2024 ALT 6 AST 11 alkaline phos.  90 bilirubin 0.3 albumin 3.2 protein 5.7                    /68   Pulse 66   Temp 36.2 °C (97.1 °F)   Resp 18   Ht 1.575 m (5' 2\")   Wt (!) 33.3 kg (73 lb 6.4 oz)   SpO2 98%   BMI 13.43 kg/m²      Physical Exam  Vitals and nursing note reviewed. Exam conducted with a chaperone present.   Constitutional:       Appearance: Normal appearance.   HENT:      Head: Normocephalic.      Right Ear: External ear normal.      Left Ear: External ear normal.      Nose: Nose normal.      Mouth/Throat:      Mouth: Mucous membranes are moist.      Pharynx: Oropharynx is clear.     Eyes:      Extraocular Movements: Extraocular movements intact.      Conjunctiva/sclera: Conjunctivae normal.      Pupils: Pupils are equal, round, and reactive to light.       Cardiovascular:      Rate and Rhythm: Normal rate and regular rhythm.      Pulses: Normal pulses.      Heart sounds: Normal heart sounds.   Pulmonary:      Effort: Pulmonary effort is normal.      Breath sounds: Normal breath sounds.   Abdominal:      General: Bowel sounds are normal.      Palpations: Abdomen is soft.     Musculoskeletal:         General: Normal range of motion.      Cervical back: Normal range of motion and neck supple.      Comments: Generalized muscle weakness; impaired gait and mobility     Skin:     General: Skin is warm and dry.      Comments: Erythematous rash on back-upper and lower     Neurological:      Mental Status: She is alert. Mental status is at baseline. She is disoriented.      Motor: Weakness present.      Coordination: Coordination abnormal.      Gait: Gait abnormal.     Psychiatric:         Mood and Affect: Affect is inappropriate.         Speech: Speech is tangential.         Cognition and Memory: Cognition is impaired. Memory is impaired.      Comments: Dementia   A x O x 1          Assessment/Plan    Weight loss; Alzheimer's disease; Major depressive disorder:              " Continue donepezil and mirtazapine.                Staff to continue to assist with meals and provide full supportive care.  Continue with [facility] hosp[facility] care in house at [facility].  Monitor weights.  Continue in house supplements and shakes QID.            Nausea and vomiting:           On Zofran PRN.            No recent N/V reported.                     Sequela of CVA; Generalized muscle weakness;  Immobility; left sided weakness; :           Patient had a stroke in October, 2020.           Continues Plavix.            Requires full supportive care.       Problem List Items Addressed This Visit       Dementia     Other Visit Diagnoses         Weight loss    -  Primary      Recurrent major depressive disorder, remission status unspecified          Nausea and vomiting, unspecified vomiting type          Sequela of cerebrovascular accident          Generalized muscle weakness          Immobility          Left-sided weakness                    YIMI Jordan     Electronically Signed By: YIMI Jordan   8/2/25  6:56 PM

## 2025-08-02 VITALS
HEIGHT: 62 IN | RESPIRATION RATE: 18 BRPM | OXYGEN SATURATION: 98 % | DIASTOLIC BLOOD PRESSURE: 68 MMHG | TEMPERATURE: 97.1 F | HEART RATE: 66 BPM | SYSTOLIC BLOOD PRESSURE: 112 MMHG | WEIGHT: 73.4 LBS | BODY MASS INDEX: 13.51 KG/M2

## 2025-08-02 NOTE — PROGRESS NOTES
Name: Shonda Solano    : 3/14/1934    Code Status: [facility]    Chief Complaint:  Follow up on weight loss;  etc....    HPI   89 year old female patient at [facility] in Long Term Care.       Medical history includes: Difficulty in walking; muscle weakness (generalized); Acquired hammer toes on the right & left foot; Personal history of other infectious and parasitic diseases; disorientation; Alzheimer's disease; unspecified; symbolic dysfunctions; other speech and language deficits following unspecified cerebrovascular disease; mood disorder due to known physiological condition, unspecified; Bipolar disorder; Displaced intertrochanteric fracture of right femur; need for assistance with personal care; chronic kidney disease; primary osteoarthritis; unspecified ankle and foot; polyneuropathy, unspecified; unspecified abnormalities of gait and mobility; other osteoporosis without current pathological fracture.      Diagnoses as follows:     Weight loss ; Alzheimer's disease; ;Major depressive disorder:              On donepezil and mirtazapine.   Patient is a [facility] hospice patient.   Staff is assisting patient with meals.  Also follows up with in-house psych NP at .  Dementia is severe.   No agitation reported.  Weights are trending down.  Recent weights as follows:  23 105.2 #  23 103 #  24 102.4 #  24 102.4 #  3/6/24 100 #  4/3/24 103.2 #  24 97.6 #  24 96.4 #  7/3/24 97.5 #  24 94 #  24 94.6 #  10/1/24 88.4 #  24 80 #  24 86. 8 #  25 81.4 #  25 82.8 #  3/7/25 77 #  25 74.8 #  25 77.4 #  25 77.2 #  25 73.4 #                      Weights have been trending down.   Also on house supplements and shakes QID.  Patient seen today.  She is in bed.  Calm, cooperative.  Mentation at baseline.   No fevers reported.  No chest pain.  No headaches or dizziness.     Nausea and vomiting:  On Zofran PRN.   No recent N/V reported.                          Sequela of CVA; Generalized muscle weakness;  Immobility; left sided weakness; :  Patient had a stroke in .  On  Plavix.  Requires full supportive care.   Very weak.  In wheelchair.                             Reviewed  EMR  Reviewed medical, social, surgical and family history.  Reviewed all current medications and performed medication reconciliation.  Performed prescription drug management.  Reviewed vital signs AND lab results  Reviewed [facility] Care Documentation  Discussed patient with nursing.       ROS: Limited ROS due to mentation; ROS negative unless noted in HPI.     Surgical History  Problems    · History of Femur fracture repair   · History of Tonsillectomy     Family History  Mother    · Family history of   Father    · Family history of      Social History   · Never a smoker  No alcohol  No illicit drugs  Lives in long term care facility              Past Medical History:   Diagnosis Date    Other malaise 2020    Physical deconditioning    Personal history of other diseases of the digestive system     History of constipation    Personal history of other diseases of the nervous system and sense organs 2020    History of polyneuropathy    Personal history of urinary (tract) infections     History of urinary tract infection    Unspecified dementia, unspecified severity, without behavioral disturbance, psychotic disturbance, mood disturbance, and anxiety 12/10/2022    Dementia    Unspecified diastolic (congestive) heart failure     Diastolic heart failure    Unspecified fracture of right femur, initial encounter for closed fracture     Femur fracture, right             Lab Results   Component Value Date    WBC 6.8 10/02/2024    HGB 12.7 10/02/2024    HCT 40.1 10/02/2024     (H) 10/02/2024    ALT 8 2023    AST 14 2023     10/02/2024    K 4.4 10/02/2024     10/02/2024    CREATININE 0.53 10/02/2024    BUN 19  10/02/2024    CO2 28 10/02/2024    TSH 2.92 10/14/2020    INR 1.0 02/20/2022    HGBA1C 5.9 10/15/2020     Outside Labs  CBC: Date: 10/12/22, WBC: 4.8, Hgb: 11.9, Hct: 35.2, PLT: 290   CBC: Date: 7/7/22, WBC: 3.9, Hgb: 12.7, Hct: 38.5, PLT: 307   BMP: Date: 10/12/22, Na: 140, K: 4.3, Cl: 107, CO2: 25, BUN: 25, Cr: 0.6, Glu: 80, Ca: 9   BMP: Date: 7/7/22, Na: 141, K: 4.6, Cl: 105, CO2: 26, BUN: 30, Cr: 0.7, Glu: 113, Ca: 9.1   Hepatic Function Tests: Date: 3/8/22, AST: 15, ALT: 11, Alk Phos: 114, T Bili: 0.4, Albumin: 3.6   Hepatic Function Tests: Date: 3/9/21, AST: 15, ALT: 14, Alk Phos: 101, T Bili: 0.6, Albumin: 3.3   Lipid panel on 10/13/20 as follows: Cholesterol 186; triglyceride 84; HDL 51; ; VLDL 17.    Vitamin D 25 hydroxy level on 10/13/20 22 L    BMP: Date: 1/10/2023 Na 142 K 4.4 Cr 0.7 BUN 27 glucose 77 Ca 9.2 GFR 79  CBC: Date: 1/10/2023 WBC 6.9 Hgb 12.8 hematocrit 38.4 platelets 398  Liver function: Date: 1/10/2023 ALT 11 AST 18 alkaline phos.  112 bilirubin 0.5 albumin 3.7 protein 6.4    BMP: Date: 4/18/2023 Na 145 K 4 Cr 0.7 BUN 25 glucose 85 Ca 8.8 GFR 79  CBC: Date: 4/18/2023 WBC 5.2 Hgb 12.6 hematocrit 38.3 platelets 298  Liver function: Date: 4/18/2023 ALT 6 AST 11 alkaline phos.  94 bilirubin 0.5 albumin 3.4 protein 5.7   4/18/23 vitamin D 25 hydroxy level 19.62 L    BMP: Date: 8/8/2023 Na 142 K 4.9 Cr 0.6 BUN 24 glucose 65 Ca 9.2 GFR 94  CBC: Date: 8/8/2023 WBC 5.4 Hgb 12.8 hematocrit 40.4 platelets 398  Liver function: Date: 8/8/2023 ALT 8 AST 18 alkaline phos.  108 bilirubin 0.6 albumin 3.7 protein 6.4    8/8/23 vitamin D hydroxy level 29.92 L    BMP: Date: 10/6/2023 Na 142 K 4.5 Cr 0.7 BUN 28 glucose 86 Ca 9.8 GFR 79  CBC: Date: 10/6/2023 WBC 6.2 Hgb 12.7 hematocrit 39.3 platelets 358    10/6/2023: TSH 2.652 within normal limits (range 0.340-5.500)    10/6/2023: Vitamin D 25-hydroxy level 32.58 within normal limits (range )    11/2/2023: Vitamin D 25-hydroxy level 33.84    BMP:  Date: 12/4/2023 Na 143 K 4.3 Cr 0.6 BUN 23 glucose 76 Ca 9.8 GFR 94  Liver function: date: 12/4/2023 ALT 10 AST 17 alkaline phos.  112 bilirubin 0.7 albumin 3.8 protein 6.5    12/4/2023 vitamin D 25-hydroxy level 56 wnl    BMP: Date: 12/11/2023 Na 140 K 4.9 Cr 0.6 BUN 21 glucose 55 Ca 9.1 GFR 94  CBC: Date: 12/13/2023 WBC 5.9 Hgb 12.6 hematocrit 39.32 platelets 364  Liver function: Date: 12/11/2023 ALT 8 AST 15 alkaline phos.  97 bilirubin 0.5 albumin 3.3 protein 5.6    12/11/2023 vitamin D hydroxy level 43 wnl    BMP: Date: 1/29/2024 Na 142 K 4.1 Cr 0.7 BUN 23 glucose 81 Ca 9.7 GFR 79  CBC: Date: 1/29/2024 WBC 6.7 Hgb 13.1 hematocrit 40.6 platelets 368  Liver function: Date: 1/29/2024 ALT 14 AST 21 alkaline phos.  131 bilirubin 0.8 albumin 4 protein 6.7    BMP: Date: 3/11/2024 Na 140 K 4.1 Cr 0.6 BUN 23 glucose 129 Ca 9.4 GFR 94  CBC: Date: 3/11/2024 WBC 6.3 Hgb 12.7 hematocrit 39 platelets 381  Liver function: Date: 3/11/2024 ALT 9 AST 14 alkaline phos.  119 bilirubin 0.5 albumin 3.5 protein 6.1    3/11/2024 vitamin D 25-hydroxy level: 35 ( )    BMP: Date: 4/8/2024 Na 143 K 4.1 Cr 0.7 BUN 28 glucose 110 Ca 10.2 GFR 95  Liver function: Date: 4/8/2024 ALT 11 AST 22 alkaline phos.  134 bilirubin 1 albumin 4.1 protein 7.2    BMP: Date: 4/18/2024 Na 141 K 4.2 Cr 0.5 BUN 22 glucose 78 Ca 9.6   CBC: Date: 4/18/2024 WBC 7.8 Hgb 13.9 hematocrit 42.9 platelets 444  Liver function: Date: 4/18/2024 ALT 9 AST 15 alkaline phos.  111 bilirubin 0.5 albumin 3.6 protein 6.1    4/18/2024 vitamin D 25-hydroxy level 35 (range )    BMP: Date: 4/22/2024 Na 144 K 4.3 Cr 0.5 BUN 18 glucose 97 Ca 9   CBC: Date: 4/22/2024 WBC 6.5 Hgb 13.4 hematocrit 40.7 platelets 442  Liver function: Date: 4/22/2024 ALT 7 AST 13 alkaline phos.  98 bilirubin 0.6 albumin 3.3 protein 6    4/22/24 vitamin D 25 hydroxy level 34    BMP: Date: 6/17/2024 Na 143 K 4.3 Cr 0.5 BUN 20 glucose 61 Ca 9.1   CBC: Date: 6/17/2024 WBC 5.3  Hgb 12.8 hematocrit 35.1 platelets 316  Liver function: Date: 6/17/2024 ALT 6 AST 14 alkaline phos.  101 bilirubin 0.4 albumin 3.3 protein 5.6    6/17/2024 vitamin D 25-hydroxy level 33    BMP: Date: 8/1/2024 Na 142 K 4.4 Cr 0.5 BUN 29 glucose 77 Ca 9.2     BMP: Date: 8/12/2024 Na 145 K 4.3 Cr 0.6 BUN 26 glucose 50 Ca 9.7 GFR 94  CBC: Date: 8/12/2024 WBC 6.2 Hgb 13.8 hematocrit 43.5 platelets 435  Liver function: Date: 8/12/2024 ALT 7 AST 18 alkaline phos.  133 bilirubin 0.9 albumin 3.8 protein 7    BMP: Date: 8/19/2024 Na 144 K 4.3 Cr 0.6 BUN 28 glucose 94 Ca 9.4 GFR 94  CBC: Date: 8/19/2024 WBC 6.7 Hgb 12.2 hematocrit 37.9 platelets 363  Liver function: Date: 8/19/2024 ALT 9 AST 14 alkaline phos.  109 bilirubin 0.6 albumin 3.7 protein 6.3    BMP: Date: 8/26/2024 Na 146 K 3.8 Cr 0.5 BUN 24 glucose 58 Ca 9.1   Liver function: Date: 8/26/2024 ALT 6 AST 13 alkaline phos.  111 bilirubin 0.5 albumin 3.4 protein 6    8/26/2024 vitamin D 25-hydroxy level 39 wnl    CBC: Date: 9/2/2024 WBC 4.9 Hgb 12.8 hematocrit 41.8 platelets 338    BMP: Date: 9/16/2024 Na 141 K 3.3 Cr 0.5 BUN 24 glucose 73 Ca 9.1   CBC: Date: 9/16/2024 WBC 7.2 Hgb 12.1 hematocrit 37.2 platelets 309  Liver function: Date: 9/16/2024 ALT 7 AST 14 alkaline phos.  93 bilirubin 0.5 albumin 3.4 protein 5.9    BMP: Date: 9/23/2024 Na 150 K 3.3 Cr 0.4 BUN 20 glucose 77 Ca 9.1   CBC: Date: 9/23/2024 WBC 5.6 Hgb 11.9 hematocrit 36.3 platelets 366  Liver function: Date: 9/23/2024 ALT 11 AST 13 alkaline phos.  80 bilirubin 0.5 albumin 3.1 protein 5.7    BMP: Date: 10/3/2024 Na 139 K 4.7 Cr 0.6 BUN 18 glucose 56 Ca 9.2 GFR 94  CBC: Date: 10/3/2024 WBC 4.4 Hgb 13.9 hematocrit 40 platelets 444    BMP: Date: 10/7/2024 Na 142 K 4.6 Cr 0.5 BUN 28 glucose 59 Ca 9.1   CBC: Date: 10/7/2024 WBC 5.3 Hgb 12.2 hematocrit 37.7 platelets 381  Liver function: Date: 10/7/2024 ALT 6 AST 11 alkaline phos.  90 bilirubin 0.3 albumin 3.2 protein  "5.7                    /68   Pulse 66   Temp 36.2 °C (97.1 °F)   Resp 18   Ht 1.575 m (5' 2\")   Wt (!) 33.3 kg (73 lb 6.4 oz)   SpO2 98%   BMI 13.43 kg/m²      Physical Exam  Vitals and nursing note reviewed. Exam conducted with a chaperone present.   Constitutional:       Appearance: Normal appearance.   HENT:      Head: Normocephalic.      Right Ear: External ear normal.      Left Ear: External ear normal.      Nose: Nose normal.      Mouth/Throat:      Mouth: Mucous membranes are moist.      Pharynx: Oropharynx is clear.     Eyes:      Extraocular Movements: Extraocular movements intact.      Conjunctiva/sclera: Conjunctivae normal.      Pupils: Pupils are equal, round, and reactive to light.       Cardiovascular:      Rate and Rhythm: Normal rate and regular rhythm.      Pulses: Normal pulses.      Heart sounds: Normal heart sounds.   Pulmonary:      Effort: Pulmonary effort is normal.      Breath sounds: Normal breath sounds.   Abdominal:      General: Bowel sounds are normal.      Palpations: Abdomen is soft.     Musculoskeletal:         General: Normal range of motion.      Cervical back: Normal range of motion and neck supple.      Comments: Generalized muscle weakness; impaired gait and mobility     Skin:     General: Skin is warm and dry.      Comments: Erythematous rash on back-upper and lower     Neurological:      Mental Status: She is alert. Mental status is at baseline. She is disoriented.      Motor: Weakness present.      Coordination: Coordination abnormal.      Gait: Gait abnormal.     Psychiatric:         Mood and Affect: Affect is inappropriate.         Speech: Speech is tangential.         Cognition and Memory: Cognition is impaired. Memory is impaired.      Comments: Dementia   A x O x 1          Assessment/Plan     Weight loss; Alzheimer's disease; Major depressive disorder:              Continue donepezil and mirtazapine.                Staff to continue to assist with meals and " provide full supportive care.  Continue with [facility]'s hospice care in house at [facility].  Monitor weights.  Continue in house supplements and shakes QID.            Nausea and vomiting:           On Zofran PRN.            No recent N/V reported.                     Sequela of CVA; Generalized muscle weakness;  Immobility; left sided weakness; :           Patient had a stroke in October, 2020.           Continues Plavix.            Requires full supportive care.       Problem List Items Addressed This Visit       Dementia     Other Visit Diagnoses         Weight loss    -  Primary      Recurrent major depressive disorder, remission status unspecified          Nausea and vomiting, unspecified vomiting type          Sequela of cerebrovascular accident          Generalized muscle weakness          Immobility          Left-sided weakness                    Sandi Rudolph, APRN-CNP

## 2025-08-06 ENCOUNTER — NURSING HOME VISIT (OUTPATIENT)
Dept: POST ACUTE CARE | Facility: EXTERNAL LOCATION | Age: OVER 89
End: 2025-08-06
Payer: COMMERCIAL

## 2025-08-06 DIAGNOSIS — G30.9 SEVERE ALZHEIMER'S DEMENTIA WITH OTHER BEHAVIORAL DISTURBANCE, UNSPECIFIED TIMING OF DEMENTIA ONSET (MULTI): Primary | ICD-10-CM

## 2025-08-06 DIAGNOSIS — F02.C18 SEVERE ALZHEIMER'S DEMENTIA WITH OTHER BEHAVIORAL DISTURBANCE, UNSPECIFIED TIMING OF DEMENTIA ONSET (MULTI): Primary | ICD-10-CM

## 2025-08-06 DIAGNOSIS — M62.81 GENERALIZED MUSCLE WEAKNESS: ICD-10-CM

## 2025-08-06 DIAGNOSIS — Z74.09 IMMOBILITY: ICD-10-CM

## 2025-08-06 PROCEDURE — 99308 SBSQ NF CARE LOW MDM 20: CPT | Performed by: INTERNAL MEDICINE

## 2025-08-06 NOTE — LETTER
Patient: Shonda Solano  : 3/14/1934    Encounter Date: 2025    Ms Solano is a 89-year-old woman with history of Alzheimer's dementia,CVA() seen for follow up.  She is a long-term resident at [facility].  No recent falls. lying in her bed No concerns by staff.  ROS:no changes in weight  Chest- no pain or palpitations  Lungs- no cough or SOB  Abd- no pain, no N/V/D  Ext- no swelling  Psych- memory impaired  Vital signs: /78  Temperature 98.1  Pulse 64  Weight 67.8 pounds  Gen- weak,NAD, pleasantly confused  Neck- supple, no jvd  Lungs: Lungs clear to auscultation  Cardio: S1-S2 normal  ABD: Soft nontender bowel sounds present  Musc/Skel: No deformities or edema  Extremities: No edema  Neuro- alert, no gross deformity  Psych: Memory impaired    Assessment and Plan:  Weight loss- c/w remeron  CVA-with weakness  Continue with aspirin, Plavix  continue with fall and safety precautions  Alzheimer's dementia-declining  c/w aricept  Continue with Hospice care- c/w comfort meds    Electronically Signed By: Indiana Wen MD   25  9:59 AM

## 2025-08-08 NOTE — PROGRESS NOTES
Ms Solano is a 89-year-old woman with history of Alzheimer's dementia,CVA(2020) seen for follow up.  She is a long-term resident at [facility].  No recent falls. lying in her bed No concerns by staff.  ROS:no changes in weight  Chest- no pain or palpitations  Lungs- no cough or SOB  Abd- no pain, no N/V/D  Ext- no swelling  Psych- memory impaired  Vital signs: /78  Temperature 98.1  Pulse 64  Weight 67.8 pounds  Gen- weak,NAD, pleasantly confused  Neck- supple, no jvd  Lungs: Lungs clear to auscultation  Cardio: S1-S2 normal  ABD: Soft nontender bowel sounds present  Musc/Skel: No deformities or edema  Extremities: No edema  Neuro- alert, no gross deformity  Psych: Memory impaired    Assessment and Plan:  Weight loss- c/w remeron  CVA-with weakness  Continue with aspirin, Plavix  continue with fall and safety precautions  Alzheimer's dementia-declining  c/w aricept  Continue with Hospice care- c/w comfort meds